# Patient Record
Sex: MALE | Race: WHITE | NOT HISPANIC OR LATINO | Employment: FULL TIME | URBAN - METROPOLITAN AREA
[De-identification: names, ages, dates, MRNs, and addresses within clinical notes are randomized per-mention and may not be internally consistent; named-entity substitution may affect disease eponyms.]

---

## 2017-04-13 ENCOUNTER — OFFICE VISIT (OUTPATIENT)
Dept: INTERNAL MEDICINE | Facility: IMAGING CENTER | Age: 67
End: 2017-04-13
Payer: MEDICARE

## 2017-04-13 VITALS
OXYGEN SATURATION: 97 % | HEIGHT: 69 IN | HEART RATE: 69 BPM | SYSTOLIC BLOOD PRESSURE: 120 MMHG | DIASTOLIC BLOOD PRESSURE: 60 MMHG | RESPIRATION RATE: 14 BRPM | WEIGHT: 185 LBS | BODY MASS INDEX: 27.4 KG/M2 | TEMPERATURE: 98.4 F

## 2017-04-13 DIAGNOSIS — E78.2 MIXED HYPERLIPIDEMIA WITH APOLIPOPROTEIN E4 VARIANT: ICD-10-CM

## 2017-04-13 DIAGNOSIS — F17.200 SMOKER: ICD-10-CM

## 2017-04-13 DIAGNOSIS — I25.84 CORONARY ATHEROSCLEROSIS DUE TO CALCIFIED CORONARY LESION: ICD-10-CM

## 2017-04-13 DIAGNOSIS — I77.6 INFLAMMATION OF ARTERIES (HCC): ICD-10-CM

## 2017-04-13 DIAGNOSIS — I25.10 CORONARY ATHEROSCLEROSIS DUE TO CALCIFIED CORONARY LESION: ICD-10-CM

## 2017-04-13 DIAGNOSIS — I10 ESSENTIAL HYPERTENSION: Primary | ICD-10-CM

## 2017-04-13 PROCEDURE — 1101F PT FALLS ASSESS-DOCD LE1/YR: CPT | Performed by: FAMILY MEDICINE

## 2017-04-13 PROCEDURE — 4004F PT TOBACCO SCREEN RCVD TLK: CPT | Performed by: FAMILY MEDICINE

## 2017-04-13 PROCEDURE — G8432 DEP SCR NOT DOC, RNG: HCPCS | Performed by: FAMILY MEDICINE

## 2017-04-13 PROCEDURE — 3017F COLORECTAL CA SCREEN DOC REV: CPT | Performed by: FAMILY MEDICINE

## 2017-04-13 PROCEDURE — 99214 OFFICE O/P EST MOD 30 MIN: CPT | Performed by: FAMILY MEDICINE

## 2017-04-13 PROCEDURE — G8419 CALC BMI OUT NRM PARAM NOF/U: HCPCS | Performed by: FAMILY MEDICINE

## 2017-04-13 PROCEDURE — 4040F PNEUMOC VAC/ADMIN/RCVD: CPT | Performed by: FAMILY MEDICINE

## 2017-04-13 PROCEDURE — G8598 ASA/ANTIPLAT THER USED: HCPCS | Performed by: FAMILY MEDICINE

## 2017-04-13 RX ORDER — LOSARTAN POTASSIUM 100 MG/1
100 TABLET ORAL DAILY
Qty: 90 TAB | Refills: 3 | Status: SHIPPED
Start: 2017-04-13 | End: 2018-04-30 | Stop reason: SDUPTHER

## 2017-04-13 RX ORDER — ROSUVASTATIN CALCIUM 10 MG/1
10 TABLET, COATED ORAL EVERY EVENING
Qty: 100 TAB | Refills: 4 | Status: SHIPPED
Start: 2017-04-13 | End: 2018-06-28 | Stop reason: SDUPTHER

## 2017-04-13 RX ORDER — EZETIMIBE 10 MG/1
10 TABLET ORAL DAILY
Qty: 100 TAB | Refills: 4 | Status: SHIPPED
Start: 2017-04-13 | End: 2018-06-28 | Stop reason: SDUPTHER

## 2017-04-13 RX ORDER — AMLODIPINE BESYLATE 10 MG/1
10 TABLET ORAL DAILY
Qty: 90 TAB | Refills: 3 | Status: SHIPPED
Start: 2017-04-13 | End: 2018-06-18 | Stop reason: SDUPTHER

## 2017-04-13 NOTE — MR AVS SNAPSHOT
"Deacon Pulido   2017 9:30 AM   Office Visit   MRN: [id number]    Department:  [facility]   Dept Phone:  [phone]    Description:  Male : 1950   Provider:  Hector Cornejo M.D.           Reason for Visit     Coronary Artery Disease           Allergies as of 2017     Allergen Noted Reactions    Cephalexin 2012       Cephalosporins 2012       Peanut-Derived 2012         You were diagnosed with     Essential hypertension   [8492913]  -  Primary     Coronary atherosclerosis due to calcified coronary lesion   [024654]       Smoker   [[id number]]       Mixed hyperlipidemia with apolipoprotein E4 variant   [[id number]]       Inflammation of arteries (CMS-HCC)   [200474]         Vital Signs     Blood Pressure Pulse Temperature Respirations Height Weight    120/60 mmHg 69 36.9 °C (98.4 °F) 14 1.753 m (5' 9.02\") 83.915 kg (185 lb)    Body Mass Index Oxygen Saturation Smoking Status             27.31 kg/m2 97% Current Every Day Smoker         Basic Information     Date Of Birth Sex Race Ethnicity Preferred Language    1950 Male White Non- English      Problem List              ICD-10-CM Priority Class Noted - Resolved    Kidney stones N20.0   Unknown - Present    Right knee pain M25.561   10/22/2013 - Present    Atherosclerosis of aorta (CMS-HCC) I70.0   10/22/2013 - Present    HTN (hypertension) I10   10/22/2013 - Present    Smoker F17.200   10/22/2013 - Present    Screening for osteoporosis Z13.820   2014 - Present    Family history of hypertension Z82.49   2014 - Present    Family history of stroke Z82.3   2014 - Present    Family history of dementia Z81.8   2014 - Present    Atherosclerosis of both carotid arteries I65.23   2014 - Present    Bronchitis J40   2014 - Present    Abnormal chest CT R93.8   2014 - Present    Mixed hyperlipidemia with apolipoprotein E4 variant E78.2   2014 - Present    Heterozygous MTHFR mutation C677T " (CMS-HCC) E72.12   9/17/2014 - Present    Elevated homocysteine (CMS-HCC) E72.11   9/17/2014 - Present    Inflammation of arteries (CMS-HCC) M30.0   9/17/2014 - Present    Insulin resistance E88.81   9/17/2014 - Present    Metabolic syndrome E88.81   9/17/2014 - Present    Dyspnea and respiratory abnormalities R06.00, R06.89   4/21/2015 - Present    Coronary atherosclerosis due to calcified coronary lesion I25.10, I25.84   12/14/2016 - Present    Thoracic aorta atherosclerosis (CMS-HCC) I70.0   12/14/2016 - Present    Dyslipidemia E78.5   12/14/2016 - Present      Health Maintenance        Date Due Completion Dates    COLONOSCOPY 6/18/2022 6/18/2012 (Done)    Override on 6/18/2012: Done    IMM DTaP/Tdap/Td Vaccine (2 - Td) 6/18/2023 6/18/2013            Current Immunizations     13-VALENT PCV PREVNAR 3/14/2008    Influenza TIV (IM) 12/14/2012    Influenza Vaccine Adult HD 11/1/2016, 10/20/2015    Pneumococcal polysaccharide vaccine (PPSV-23) 10/22/2013    SHINGLES VACCINE 12/14/2012    Tdap Vaccine 6/18/2013      Below and/or attached are the medications your provider expects you to take. Review all of your home medications and newly ordered medications with your provider and/or pharmacist. Follow medication instructions as directed by your provider and/or pharmacist. Please keep your medication list with you and share with your provider. Update the information when medications are discontinued, doses are changed, or new medications (including over-the-counter products) are added; and carry medication information at all times in the event of emergency situations     Allergies:  CEPHALEXIN - (reactions not documented)     CEPHALOSPORINS - (reactions not documented)     PEANUT-DERIVED - (reactions not documented)               Medications  Valid as of: April 13, 2017 - 11:03 AM    Generic Name Brand Name Tablet Size Instructions for use    Acetaminophen-Codeine (Solution) TYLENOL-CODEINE 120-12 MG/5ML Take 10 mL by  mouth every 6 hours as needed.        Albuterol Sulfate (Aero Soln) albuterol 108 (90 BASE) MCG/ACT Inhale 2 Puffs by mouth every 6 hours as needed for Shortness of Breath.        AmLODIPine Besylate (Tab) NORVASC 10 MG Take 1 Tab by mouth every day.        Aspirin (Tablet Delayed Response) ECOTRIN 81 MG Take 1 Tab by mouth every day.        Beclomethasone Dipropionate (Aero Soln) QVAR 40 MCG/ACT Inhale 1 Puff by mouth 2 Times a Day.        Cholecalciferol (Cap) Vitamin D 2000 UNITS Take 1 Cap by mouth every day.        Cyanocobalamin (Tab CR) B-12 1000 MCG Take 1 Each by mouth every day.        Ezetimibe (Tab) ZETIA 10 MG Take 1 Tab by mouth every day.        L-Methylfolate (Tab) L-Methylfolate 1 MG Take 1 Tab by mouth every day. Solgar - Metafolin        Losartan Potassium (Tab) COZAAR 100 MG Take 1 Tab by mouth every day.        Rosuvastatin Calcium (Tab) CRESTOR 10 MG Take 1 Tab by mouth every evening.        Varenicline Tartrate (Misc) CHANTIX DALLAS 0.5 MG X 11 & 1 MG X 42 Take as directed.        Varenicline Tartrate (Tab) CHANTIX 1 MG Take 1 Tab by mouth 2 times a day.        .                 Medicines prescribed today were sent to:     [facility] [id number] [facility] [place] - [id number] [facility] [address]    Phone: [phone] Fax: [phone]    Open 24 Hours?: No      Medication refill instructions:       If your prescription bottle indicates you have medication refills left, it is not necessary to call your provider’s office. Please contact your pharmacy and they will refill your medication.    If your prescription bottle indicates you do not have any refills left, you may request refills at any time through one of the following ways: The online [email or web address] system (except Urgent Care), by calling your provider’s office, or by asking your pharmacy to contact your provider’s office with a refill request. Medication refills are processed only during regular business hours and may not be available  until the next business day. Your provider may request additional information or to have a follow-up visit with you prior to refilling your medication.   *Please Note: Medication refills are assigned a new Rx number when refilled electronically. Your pharmacy may indicate that no refills were authorized even though a new prescription for the same medication is available at the pharmacy. Please request the medicine by name with the pharmacy before contacting your provider for a refill.        Instructions    Current Outpatient Prescriptions Ordered in [facility]   Medication Sig Dispense Refill   • losartan (COZAAR) 100 MG Tab Take 1 Tab by mouth every day. 90 Tab 3   • amlodipine (NORVASC) 10 MG Tab Take 1 Tab by mouth every day. 90 Tab 3   • ezetimibe (ZETIA) 10 MG Tab Take 1 Tab by mouth every day. 100 Tab 4   • rosuvastatin (CRESTOR) 10 MG Tab Take 1 Tab by mouth every evening. 100 Tab 4   • Cholecalciferol (VITAMIN D) 2000 UNITS Cap Take 1 Cap by mouth every day.     • Cyanocobalamin (B-12) 1000 MCG TBCR Take 1 Each by mouth every day.     • L-Methylfolate 1 MG TABS Take 1 Tab by mouth every day. Solgar - Metafolin     • aspirin EC (ECOTRIN) 81 MG TBEC Take 1 Tab by mouth every day.     • beclomethasone (QVAR) 40 MCG/ACT inhaler Inhale 1 Puff by mouth 2 Times a Day. 1 Inhaler 12   • albuterol 108 (90 BASE) MCG/ACT Aero Soln inhalation aerosol Inhale 2 Puffs by mouth every 6 hours as needed for Shortness of Breath. 8.5 g 12   • varenicline (CHANTIX STARTING MONTH PAK) 0.5 MG X 11 & 1 MG X 42 tablet Take as directed. 56 Tab 0   • varenicline (CHANTIX CONTINUING MONTH DALLAS) 1 MG tablet Take 1 Tab by mouth 2 times a day. 60 Tab 11   • acetaminophen-codeine (TYLENOL-CODEINE) 120-12 MG/5ML Solution Take 10 mL by mouth every 6 hours as needed. 120 mL 0     No current Epic-ordered facility-administered medications on file.           Other Notes About Your Plan     9/17/14 My chart sign-up  BLOOD DRAWS: BUTTERFLY SLOW  RETURN  8/14/14 Lab draw 8/17 f/u  9/17  PSA 6/2013    DEXA DUE 6/14           MyChart Access Code: Activation code not generated  Current MyChart Status: Active          Quit Tobacco Information     Do you want to quit using tobacco?    Quitting tobacco decreases risks of cancer, heart and lung disease, increases life expectancy, improves sense of taste and smell, and increases spending money, among other benefits.    If you are thinking about quitting, we can help.  • [facility] Quit Tobacco Program: [phone]  o Program occurs weekly for four weeks and includes pharmacist consultation on products to support quitting smoking or chewing tobacco. A provider referral is needed for pharmacist consultation.  • Tobacco Users Help Hotline: 7-401-QUITNOW (137-2622) or https://[place].quitlogix.org/  o Free, confidential telephone and online coaching for [place] residents. Sessions are designed on a schedule that is convenient for you. Eligible clients receive free nicotine replacement therapy.  • Nationally: www.smokefree.gov  o Information and professional assistance to support both immediate and long-term needs as you become, and remain, a non-smoker. Smokefree.gov allows you to choose the help that best fits your needs.

## 2017-04-13 NOTE — PATIENT INSTRUCTIONS
Current Outpatient Prescriptions Ordered in [facility]   Medication Sig Dispense Refill   • losartan (COZAAR) 100 MG Tab Take 1 Tab by mouth every day. 90 Tab 3   • amlodipine (NORVASC) 10 MG Tab Take 1 Tab by mouth every day. 90 Tab 3   • ezetimibe (ZETIA) 10 MG Tab Take 1 Tab by mouth every day. 100 Tab 4   • rosuvastatin (CRESTOR) 10 MG Tab Take 1 Tab by mouth every evening. 100 Tab 4   • Cholecalciferol (VITAMIN D) 2000 UNITS Cap Take 1 Cap by mouth every day.     • Cyanocobalamin (B-12) 1000 MCG TBCR Take 1 Each by mouth every day.     • L-Methylfolate 1 MG TABS Take 1 Tab by mouth every day. Solgar - Metafolin     • aspirin EC (ECOTRIN) 81 MG TBEC Take 1 Tab by mouth every day.     • beclomethasone (QVAR) 40 MCG/ACT inhaler Inhale 1 Puff by mouth 2 Times a Day. 1 Inhaler 12   • albuterol 108 (90 BASE) MCG/ACT Aero Soln inhalation aerosol Inhale 2 Puffs by mouth every 6 hours as needed for Shortness of Breath. 8.5 g 12   • varenicline (CHANTIX STARTING MONTH PAK) 0.5 MG X 11 & 1 MG X 42 tablet Take as directed. 56 Tab 0   • varenicline (CHANTIX CONTINUING MONTH DALLAS) 1 MG tablet Take 1 Tab by mouth 2 times a day. 60 Tab 11   • acetaminophen-codeine (TYLENOL-CODEINE) 120-12 MG/5ML Solution Take 10 mL by mouth every 6 hours as needed. 120 mL 0     No current Epic-ordered facility-administered medications on file.

## 2017-04-13 NOTE — PROGRESS NOTES
SUBJECTIVE:    Chief Complaint   Patient presents with   • Coronary Artery Disease       Deacon Pulido is a 66 y.o. male,   Established Patient     PROBLEM #1-HISTORY OF PRESENT ILLNESS  Existing Problem, but requiring re-evaluation  PATIENT STATEMENT OF PROBLEM - HTN, subclinical atherosclerosis, smoker  ONSET - years  COURSE - doing well. Still smoking, but he states he is much closer to quitting than he has ever been before. Needs refills.   INTENSITY/STATUS/LOCATION/RADIATION - variable/ present  AGGRAVATORS - Multifactorial   RELIEVERS - meds, some Therapeutic Lifestyle Changes   TREATMENTS/COMPLIANCE/@GOAL? - same/ no / not w/ smoking    PROBLEM #2-HISTORY OF PRESENT ILLNESS  Existing Problem, but requiring re-evaluation  PATIENT STATEMENT OF PROBLEM - Abnormal Chest CT, 11/2016  ONSET - 11/2016  COURSE - I reminded him today that he will need another Chest CT in Nov. 2017 for follow-up.     Allergies   Allergen Reactions   • Cephalexin    • Cephalosporins    • Peanut-Derived        Patient Active Problem List    Diagnosis Date Noted   • Coronary atherosclerosis due to calcified coronary lesion 12/14/2016   • Thoracic aorta atherosclerosis (CMS-HCC) 12/14/2016   • Dyslipidemia 12/14/2016   • Dyspnea and respiratory abnormalities 04/21/2015   • Mixed hyperlipidemia with apolipoprotein E4 variant 09/17/2014   • Heterozygous MTHFR mutation C677T (CMS-HCC) 09/17/2014   • Elevated homocysteine (CMS-[facility]) 09/17/2014   • Inflammation of arteries (CMS-[facility]) 09/17/2014   • Insulin resistance 09/17/2014   • Metabolic syndrome 09/17/2014   • Atherosclerosis of both carotid arteries 08/11/2014   • Bronchitis 08/11/2014   • Abnormal chest CT 08/11/2014   • Screening for osteoporosis 08/07/2014   • Family history of hypertension 08/07/2014   • Family history of stroke 08/07/2014   • Family history of dementia 08/07/2014   • Right knee pain 10/22/2013   • Atherosclerosis of aorta (CMS-HCC) 10/22/2013   • HTN (hypertension)  10/22/2013   • Smoker 10/22/2013   • Kidney stones        Outpatient Encounter Prescriptions as of 4/13/2017   Medication Sig Dispense Refill   • losartan (COZAAR) 100 MG Tab Take 1 Tab by mouth every day. 90 Tab 3   • amlodipine (NORVASC) 10 MG Tab Take 1 Tab by mouth every day. 90 Tab 3   • ezetimibe (ZETIA) 10 MG Tab Take 1 Tab by mouth every day. 100 Tab 4   • rosuvastatin (CRESTOR) 10 MG Tab Take 1 Tab by mouth every evening. 100 Tab 4   • Cholecalciferol (VITAMIN D) 2000 UNITS Cap Take 1 Cap by mouth every day.     • Cyanocobalamin (B-12) 1000 MCG TBCR Take 1 Each by mouth every day.     • L-Methylfolate 1 MG TABS Take 1 Tab by mouth every day. Solgar - Metafolin     • aspirin EC (ECOTRIN) 81 MG TBEC Take 1 Tab by mouth every day.     • beclomethasone (QVAR) 40 MCG/ACT inhaler Inhale 1 Puff by mouth 2 Times a Day. 1 Inhaler 12   • albuterol 108 (90 BASE) MCG/ACT Aero Soln inhalation aerosol Inhale 2 Puffs by mouth every 6 hours as needed for Shortness of Breath. 8.5 g 12   • varenicline (CHANTIX STARTING MONTH DALLAS) 0.5 MG X 11 & 1 MG X 42 tablet Take as directed. 56 Tab 0   • varenicline (CHANTIX CONTINUING MONTH PAK) 1 MG tablet Take 1 Tab by mouth 2 times a day. 60 Tab 11   • [DISCONTINUED] losartan (COZAAR) 100 MG Tab TAKE ONE TABLET BY MOUTH DAILY FOR HIGH    BLOOD PRESSURE -GENERIC FOR COZAAR 90 Tab 3   • [DISCONTINUED] amlodipine (NORVASC) 10 MG Tab TAKE ONE TABLET BY MOUTH DAILY -GENERICFOR NORVASC 90 Tab 3   • acetaminophen-codeine (TYLENOL-CODEINE) 120-12 MG/5ML Solution Take 10 mL by mouth every 6 hours as needed. 120 mL 0   • [DISCONTINUED] ezetimibe (ZETIA) 10 MG Tab Take 1 Tab by mouth every day. 100 Tab 4   • [DISCONTINUED] rosuvastatin (CRESTOR) 10 MG Tab Take 1 Tab by mouth every evening. 100 Tab 4   • [DISCONTINUED] rosuvastatin (CRESTOR) 10 MG TABS Take 1 Tab by mouth every evening. 90 Tab 4     No facility-administered encounter medications on file as of 4/13/2017.       Social History  "  Substance Use Topics   • Smoking status: Current Every Day Smoker -- 1.00 packs/day for 40 years     Types: Cigarettes   • Smokeless tobacco: Never Used      Comment: 3/4 pack per day   • Alcohol Use: 0.0 oz/week     0 Standard drinks or equivalent per week      Comment: rarely       Family History   Problem Relation Age of Onset   • Asthma Mother    • Hypertension Father    • Stroke Father    • Dementia Father    • Cancer Paternal Aunt    • Cancer Paternal Uncle        Patient's Past, Social, and Family History reviewed and updated by me in EPIC today.    REVIEW OF SYMPTOMS:               Pertinent Positives as above.    All other systems reviewed and negative.     OBJECTIVE:    /60 mmHg  Pulse 69  Temp(Src) 36.9 °C (98.4 °F)  Resp 14  Ht 1.753 m (5' 9.02\")  Wt 83.915 kg (185 lb)  BMI 27.31 kg/m2  SpO2 97%  Body mass index is 27.31 kg/(m^2).    Well developed, well nourished male, no acute distress, chronically-ill appearing. Comfortable, appears stated age, pleasant and cooperative  HEAD: atraumatic, normocephalic   EYES: Conjunctiva normal, EOMI, PERRLA, acuity grossly intact.   EARS/NOSE/THROAT: TM's normal, no SSX of infection, no perforation, no hemotympanum, acuity grossly intact. Oropharynx: benign, no lesions noted. Nares: benign.   NECK: supple, no adenopathy, no thyromegaly or nodules, no JVD, no carotid bruits.   CHEST/LUNGS: clear to auscultation and percussion bilaterally. No adventitious breath sounds.   CARDIOVASCULAR: regular rate and rhythm, no murmur. PMI not displaced. Good central and peripheral pulses.   BACK: no CVA tenderness.   ABDOMEN: soft, non-tender, non-distended, no masses, no hepatosplenomegaly. Normal active bowel tones.   : deferred.   Rectal: deferred.   Extremities: warm/well-perfused, no cyanosis, clubbing, or edema.   SKIN: clear, unbroken, no rashes, normal turgor.   Neuro: Mental Status: Alert and Oriented x 3. CN II-XII grossly intact. Gait normal. Non-focal, " intact. Normal strength, sensation    ASSESSMENT:    1. Essential hypertension  losartan (COZAAR) 100 MG Tab    amlodipine (NORVASC) 10 MG Tab   2. Coronary atherosclerosis due to calcified coronary lesion     3. Smoker     4. Mixed hyperlipidemia with apolipoprotein E4 variant  ezetimibe (ZETIA) 10 MG Tab    rosuvastatin (CRESTOR) 10 MG Tab   5. Inflammation of arteries (CMS-HCC)  rosuvastatin (CRESTOR) 10 MG Tab       PLAN:    Total Face-to-Face time spent with patient: 30 minutes  Amount of time spent counseling patient and/or coordinating care: 20 minutes    The nature of patient counseling as below:  -Patient Education, including below topics:  -Differential Diagnoses and treatment options discussed  -Risks, benefits, alternatives discussed  -Pertinent previous reviewed with patient in detail  -Health Maintenance Exam issues discussed  -Exercise  -Dietary recommendations discussed  -QUIT SMOKING!!!  -Therapeutic Lifestyle Changes discussed    The nature of coordination of care as below:  -Medications added: Multiple refills  -Medications discontinued: none  -Medications adjusted: none  -Continue (other) present chronic medications  -Referrals: He is deciding which provider to establish with in light of my upcoming practice change  -Other: I reminded him that he will need to have another Chest CT in Nov. 2017  -Seek medical attention immediately if worse    FOLLOW-UP:  For Health Care Maintenance Exams  And as needed.

## 2017-05-09 ENCOUNTER — OFFICE VISIT (OUTPATIENT)
Dept: MEDICAL GROUP | Facility: MEDICAL CENTER | Age: 67
End: 2017-05-09
Payer: MEDICARE

## 2017-05-09 VITALS
HEART RATE: 74 BPM | HEIGHT: 69 IN | TEMPERATURE: 98.8 F | DIASTOLIC BLOOD PRESSURE: 66 MMHG | OXYGEN SATURATION: 98 % | WEIGHT: 184.2 LBS | BODY MASS INDEX: 27.28 KG/M2 | SYSTOLIC BLOOD PRESSURE: 122 MMHG

## 2017-05-09 DIAGNOSIS — F17.210 CIGARETTE NICOTINE DEPENDENCE, UNCOMPLICATED: ICD-10-CM

## 2017-05-09 DIAGNOSIS — E78.2 MIXED HYPERLIPIDEMIA WITH APOLIPOPROTEIN E4 VARIANT: ICD-10-CM

## 2017-05-09 DIAGNOSIS — Z12.2 ENCOUNTER FOR SCREENING FOR LUNG CANCER: ICD-10-CM

## 2017-05-09 DIAGNOSIS — F17.200 SMOKER: ICD-10-CM

## 2017-05-09 DIAGNOSIS — R73.03 PREDIABETES: ICD-10-CM

## 2017-05-09 DIAGNOSIS — I10 ESSENTIAL HYPERTENSION: ICD-10-CM

## 2017-05-09 DIAGNOSIS — E88.810 METABOLIC SYNDROME: ICD-10-CM

## 2017-05-09 PROCEDURE — 99214 OFFICE O/P EST MOD 30 MIN: CPT | Performed by: FAMILY MEDICINE

## 2017-05-09 NOTE — PATIENT INSTRUCTIONS
"Tips for Musculoskeletal pain:     1) RICE therapy:    Rest the injured area as much as possible for the first 24-48hours after the   injury.  Then, think of ways to modify your activity to not re-injure the   same area or cause new injury to a different musculoskeletal group.   Ice the injury whenever there is inflammation/swelling, increased warmth to the   joint, or at the end of a long day of use.  Do not place ice directly on skin,   and use ice therapy for no longer than 15-20 minutes at a time.   Compresses.  In addition to the ice compresses:     a) Use warm moist compresses (either a heating pad or a  clean sock    filled with rice or beans then microwaved to a comfortable     warmth) for 15-20minutes, particularly first thing in the morning.     An alternative could be to have a warm to hot shower     (non-scalding) beat directly on the affected area.    b) A compressive bandage may be used to provide support, decrease    swelling, and potentially improve comfort.   Elevate affected area to above the level of your heart.  This increases lymphatic   drainage from the affected area, and thus decreases swelling/pain.     2) Medications:   NSAID Therapy to decrease swelling/inflammation of muscles, and nerves, as   well as to provide pain relief: Ibuprofen 600mg by mouth up to every 6 hours as needed   Topical analgesia: Patches (Menthol, Methyl Salicylate, Camphor, Capsaicin, Lidocaine) or Creams or Balms (Menthol, Methyl Salicylate, Camphor, Capsaicin)     3) Physical Therapy and General Instructions:   Begin normal activities as pain recedes.     Dr. Arcos's tips for \"Lifestyle Medicine:\"     Check out the talk/documentary on \"How not to die\" by Dr. Jose Hobbs (on his website nutritionfacts.org, he also authored a book with this title).       1) Make SMART lifestyle changes: Specific, Measurable, Attainable, Relevant, Time-sensitive.  The lifestyle changes that you need to make are with regards to: " nutrition, cardiovascular exercise, sleep, stress management.  Make these changes every 2 weeks, revisiting the previous goals and perhaps revising them and/or setting new ones.       2) Nutrition: Make as many changes as you can to increase the amount of whole-foods (not Whole Foods, necessarily!  ;-)), plant-based diet as possible:   A) Books: Eat to Live (Dr. Malcolm Tomlin), The Spectrum (Dr. Anthony Mahoney), The Starch Solution (Dr. Deacon Givens)      B) Documentaries (can usually be found on [employer]): [place].  Fat, Sick, and Nearly Dead.  Fed Up.           3) Cardiovascular Exercise: The center for disease control recommends a minimum of 150 minutes per week of moderate intensity cardiovascular exercise for weight maintenance and cardiovascular health.  Set this as your initial goal, with at least 30 minutes per session. Types of exercise can include 30 minutes of elliptical, 30 minutes of decently fast jog, 30 minutes of swimming, 30 minutes of heavy gardening (lifting big bags of fertilizer, digging deep holes/ditches).  He can cut down the minute requirements to half, by doing higher intensity sports such as a game of tennis, or soccer.  He notes the library and check out with they have for home exercise programs, as well.       4) Sleep:    A) Goal: Obtain a minimum of 7-8hours of continuous, uninterrupted, restful sleep per night.    B) Tips for Sleep Hygiene:    I) Go to bed and wake up at consistent times whether work/school day or not.     II) Keep room dark, quiet, and comfortable.  Increase exposure to sunlight during awake times and avoid bright lights (especially anything with a backlight) at least the last 1-2hours before going to sleep.     III) Don't nap.     IV) Avoid stimulant or caffeine use more than 4 hours after wake time.        5) Stress Management: You cannot change the stresses of life dizziness necessarily, but you can change how he responds of them. One good way to manage  stress is to write things down in order to help you process how to approach things in general or specifically. Another good way is to talk it out with someone you trusts, specifically your significant other or good friend. A definite great way to deal with stress is to have cardiovascular exercise!

## 2017-05-09 NOTE — MR AVS SNAPSHOT
"        Deacon Pulido   2017 9:00 AM   Office Visit   MRN: [id number]    Department:  [place] [address]   Dept Phone:  [phone]    Description:  Male : 1950   Provider:  Berny Arcos M.D.           Reason for Visit     Establish Care shoulder ache x2-3 mos      Allergies as of 2017     Allergen Noted Reactions    Penicillamine 2017   Unspecified    high    Cephalexin 2012       Cephalosporins 2012       Peanut-Derived 2012         You were diagnosed with     Metabolic syndrome   [170979]       Prediabetes   [058298]       Mixed hyperlipidemia with apolipoprotein E4 variant   [579636]       Essential hypertension   [3645604]       Encounter for screening for lung cancer   [3223507]       Smoker   [184309]       Cigarette nicotine dependence, uncomplicated   [226318]         Vital Signs     Blood Pressure Pulse Temperature Height Weight Body Mass Index    122/66 mmHg 74 37.1 °C (98.8 °F) 1.753 m (5' 9.02\") 83.553 kg (184 lb 3.2 oz) 27.19 kg/m2    Oxygen Saturation Smoking Status                98% Current Every Day Smoker          Basic Information     Date Of Birth Sex Race Ethnicity Preferred Language    1950 Male White Non- English      Problem List              ICD-10-CM Priority Class Noted - Resolved    History of kidney stones Z87.442   Unknown - Present    Primary osteoarthritis of right knee M17.11   10/22/2013 - Present    Atherosclerosis of aorta (CMS-HCC) I70.0   10/22/2013 - Present    HTN (hypertension) I10   10/22/2013 - Present    Smoker F17.200   10/22/2013 - Present    Screening for osteoporosis Z13.820   2014 - Present    Family history of hypertension Z82.49   2014 - Present    Family history of stroke Z82.3   2014 - Present    Family history of dementia Z81.8   2014 - Present    Atherosclerosis of both carotid arteries I65.23   2014 - Present    Abnormal chest CT R93.8   2014 - Present    Mixed hyperlipidemia with " apolipoprotein E4 variant E78.2   9/17/2014 - Present    Heterozygous MTHFR mutation C677T (CMS-HCC) E72.12   9/17/2014 - Present    Inflammation of arteries (CMS-HCC) M30.0   9/17/2014 - Present    Prediabetes R73.03   9/17/2014 - Present    Metabolic syndrome E88.81   9/17/2014 - Present    Coronary atherosclerosis due to calcified coronary lesion I25.10, I25.84   12/14/2016 - Present    Thoracic aorta atherosclerosis (CMS-HCC) I70.0   12/14/2016 - Present    Dyslipidemia E78.5   12/14/2016 - Present    Encounter for screening for lung cancer Z12.2   5/9/2017 - Present      Health Maintenance        Date Due Completion Dates    COLONOSCOPY 6/18/2022 6/18/2012 (Done)    Override on 6/18/2012: Done    IMM DTaP/Tdap/Td Vaccine (2 - Td) 6/18/2023 6/18/2013            Current Immunizations     13-VALENT PCV PREVNAR 3/14/2008    Influenza TIV (IM) 12/14/2012    Influenza Vaccine Adult HD 11/1/2016, 10/20/2015    Pneumococcal polysaccharide vaccine (PPSV-23) 10/22/2013    SHINGLES VACCINE 12/14/2012    Tdap Vaccine 6/18/2013      Below and/or attached are the medications your provider expects you to take. Review all of your home medications and newly ordered medications with your provider and/or pharmacist. Follow medication instructions as directed by your provider and/or pharmacist. Please keep your medication list with you and share with your provider. Update the information when medications are discontinued, doses are changed, or new medications (including over-the-counter products) are added; and carry medication information at all times in the event of emergency situations     Allergies:  PENICILLAMINE - Unspecified     CEPHALEXIN - (reactions not documented)     CEPHALOSPORINS - (reactions not documented)     PEANUT-DERIVED - (reactions not documented)               Medications  Valid as of: May 09, 2017 - 10:06 AM    Generic Name Brand Name Tablet Size Instructions for use    Albuterol Sulfate (Aero Soln)  albuterol 108 (90 BASE) MCG/ACT Inhale 2 Puffs by mouth every 6 hours as needed for Shortness of Breath.        AmLODIPine Besylate (Tab) NORVASC 10 MG Take 1 Tab by mouth every day.        Aspirin (Tablet Delayed Response) ECOTRIN 81 MG Take 1 Tab by mouth every day.        Beclomethasone Dipropionate (Aero Soln) QVAR 40 MCG/ACT Inhale 1 Puff by mouth 2 Times a Day.        Cholecalciferol (Cap) Vitamin D 2000 UNITS Take 1 Cap by mouth every day.        Cyanocobalamin (Tab CR) B-12 1000 MCG Take 1 Each by mouth every day.        Ezetimibe (Tab) ZETIA 10 MG Take 1 Tab by mouth every day.        Losartan Potassium (Tab) COZAAR 100 MG Take 1 Tab by mouth every day.        Rosuvastatin Calcium (Tab) CRESTOR 10 MG Take 1 Tab by mouth every evening.        .                 Medicines prescribed today were sent to:     [facility] - [place], [place] - [address] [facility]    [address] [facility] [address]    Phone: [phone] Fax: [phone]    Open 24 Hours?: No      Medication refill instructions:       If your prescription bottle indicates you have medication refills left, it is not necessary to call your provider’s office. Please contact your pharmacy and they will refill your medication.    If your prescription bottle indicates you do not have any refills left, you may request refills at any time through one of the following ways: The online [employer] system (except Urgent Care), by calling your provider’s office, or by asking your pharmacy to contact your provider’s office with a refill request. Medication refills are processed only during regular business hours and may not be available until the next business day. Your provider may request additional information or to have a follow-up visit with you prior to refilling your medication.   *Please Note: Medication refills are assigned a new Rx number when refilled electronically. Your pharmacy may indicate that no refills were authorized even though a new prescription for  "the same medication is available at the pharmacy. Please request the medicine by name with the pharmacy before contacting your provider for a refill.        Instructions    Tips for Musculoskeletal pain:     1) RICE therapy:    Rest the injured area as much as possible for the first 24-48hours after the   injury.  Then, think of ways to modify your activity to not re-injure the   same area or cause new injury to a different musculoskeletal group.   Ice the injury whenever there is inflammation/swelling, increased warmth to the   joint, or at the end of a long day of use.  Do not place ice directly on skin,   and use ice therapy for no longer than 15-20 minutes at a time.   Compresses.  In addition to the ice compresses:     a) Use warm moist compresses (either a heating pad or a  clean sock    filled with rice or beans then microwaved to a comfortable     warmth) for 15-20minutes, particularly first thing in the morning.     An alternative could be to have a warm to hot shower     (non-scalding) beat directly on the affected area.    b) A compressive bandage may be used to provide support, decrease    swelling, and potentially improve comfort.   Elevate affected area to above the level of your heart.  This increases lymphatic   drainage from the affected area, and thus decreases swelling/pain.     2) Medications:   NSAID Therapy to decrease swelling/inflammation of muscles, and nerves, as   well as to provide pain relief: Ibuprofen 600mg by mouth up to every 6 hours as needed   Topical analgesia: Patches (Menthol, Methyl Salicylate, Camphor, Capsaicin, Lidocaine) or Creams or Balms (Menthol, Methyl Salicylate, Camphor, Capsaicin)     3) Physical Therapy and General Instructions:   Begin normal activities as pain recedes.     Dr. Arcos's tips for \"Lifestyle Medicine:\"     Check out the talk/documentary on \"How not to die\" by Dr. Jose Hobbs (on his website nutritionfacts.org, he also authored a book with this " title).       1) Make SMART lifestyle changes: Specific, Measurable, Attainable, Relevant, Time-sensitive.  The lifestyle changes that you need to make are with regards to: nutrition, cardiovascular exercise, sleep, stress management.  Make these changes every 2 weeks, revisiting the previous goals and perhaps revising them and/or setting new ones.       2) Nutrition: Make as many changes as you can to increase the amount of whole-foods (not Whole Foods, necessarily!  ;-)), plant-based diet as possible:   A) Books: Eat to Live (Dr. Malcolm Tomlin), The Spectrum (Dr. Anthony Mahoney), The Starch Solution (Dr. Deacon Givens)      B) Documentaries (can usually be found on [employer]): [place].  Fat, Sick, and Nearly Dead.  Fed Up.           3) Cardiovascular Exercise: The center for disease control recommends a minimum of 150 minutes per week of moderate intensity cardiovascular exercise for weight maintenance and cardiovascular health.  Set this as your initial goal, with at least 30 minutes per session. Types of exercise can include 30 minutes of elliptical, 30 minutes of decently fast jog, 30 minutes of swimming, 30 minutes of heavy gardening (lifting big bags of fertilizer, digging deep holes/ditches).  He can cut down the minute requirements to half, by doing higher intensity sports such as a game of tennis, or soccer.  He notes the library and check out with they have for home exercise programs, as well.       4) Sleep:    A) Goal: Obtain a minimum of 7-8hours of continuous, uninterrupted, restful sleep per night.    B) Tips for Sleep Hygiene:    I) Go to bed and wake up at consistent times whether work/school day or not.     II) Keep room dark, quiet, and comfortable.  Increase exposure to sunlight during awake times and avoid bright lights (especially anything with a backlight) at least the last 1-2hours before going to sleep.     III) Don't nap.     IV) Avoid stimulant or caffeine use more than 4 hours after  wake time.        5) Stress Management: You cannot change the stresses of life dizziness necessarily, but you can change how he responds of them. One good way to manage stress is to write things down in order to help you process how to approach things in general or specifically. Another good way is to talk it out with someone you trusts, specifically your significant other or good friend. A definite great way to deal with stress is to have cardiovascular exercise!         Other Notes About Your Plan     9/17/14 My chart sign-up  BLOOD DRAWS: BUTTERFLY SLOW RETURN  8/14/14 Lab draw 8/17 f/u  9/17  PSA 6/2013    DEXA DUE 6/14           [email or web address] Access Code: Activation code not generated  Current [email or web address] Sentinel Status: Active          Quit Tobacco Information     Do you want to quit using tobacco?    Quitting tobacco decreases risks of cancer, heart and lung disease, increases life expectancy, improves sense of taste and smell, and increases spending money, among other benefits.    If you are thinking about quitting, we can help.  • [facility] Quit Tobacco Program: [phone]  o Program occurs weekly for four weeks and includes pharmacist consultation on products to support quitting smoking or chewing tobacco. A provider referral is needed for pharmacist consultation.  • Tobacco Users Help Hotline: 6-535-QUITNOW (698-7900) or https://[place].quitlogix.org/  o Free, confidential telephone and online coaching for [place] residents. Sessions are designed on a schedule that is convenient for you. Eligible clients receive free nicotine replacement therapy.  • Nationally: www.smokefree.gov  o Information and professional assistance to support both immediate and long-term needs as you become, and remain, a non-smoker. Smokefree.gov allows you to choose the help that best fits your needs.

## 2017-05-16 NOTE — PROGRESS NOTES
Subjective:     Chief Complaint   Patient presents with   • Establish Care     shoulder ache x2-3 mos       History of Present Illness:  Deacon Pulido is a 66 y.o. male established patient who presents today to establish her medical care with me. PMH, PSH, Social History, Medications, Allergies, FMH all reviewed as documented:    Metabolic syndrome  Patient with prediabetes with an A1c of 5.8% in November 2016, mixed hyperlipidemia with lipid profile as below (is taking his Crestor 10 mg by mouth daily at bedtime, as well as a 10 mg by mouth daily), and hypertension (is taking his Norvasc 10 mg by mouth daily, and Losartan 100 mg by mouth daily), and is also taking his aspirin 81 mg by mouth daily.    ROS is NEGATIVE for dizziness, generalized weakness/fatigue, vision/hearing changes, jaw pain/paresthesias, BUE pain/paresthesias/numbness/weakness, chest pain/pressure, palpitations, dyspnea, RUQ abdominal pain, oliguria/anuria, BLE edema.    ROS is NEGATIVE for blurred vision, polydipsia, polyuria, diaphoresis, palpitations, fatigue, irritability, flank pain, BLE paresthesias.    Prediabetes  Please see notes from same date of service 05/09/2017 regarding metabolic syndrome.    Mixed hyperlipidemia with apolipoprotein E4 variant  Please see notes from same date of service 05/09/2017 regarding metabolic syndrome.    LDL 47 on 11/2016.    Lab Results   Component Value Date/Time    CHOLESTEROL, 11/01/2016 10:15 AM    LDL 90 06/06/2013 09:00 AM    HDL 54 11/01/2016 10:15 AM    TRIGLYCERIDES 85 11/01/2016 10:15 AM        HTN (hypertension)  Please see notes from same date of service 05/09/2017 regarding metabolic syndrome.    Encounter for screening for lung cancer  Patient has not had lung cancer screening. Patient is currently without any symptoms suggestive of lung cancer.    ROS negative for fevers, chills, generalized weakness/fatigue, unintentional weight loss, hematochezia, melena, nausea, dyspnea, tachypnea,  respiratory distress, cyanotic skin changes, altered mental status, syncopal/presyncopal episodes.        Patient Active Problem List    Diagnosis Date Noted   • Encounter for screening for lung cancer 05/09/2017   • Coronary atherosclerosis due to calcified coronary lesion 12/14/2016   • Thoracic aorta atherosclerosis (CMS-HCC) 12/14/2016   • Mixed hyperlipidemia with apolipoprotein E4 variant 09/17/2014   • Heterozygous MTHFR mutation C677T (CMS-HCC) 09/17/2014   • Inflammation of arteries (CMS-HCC) 09/17/2014   • Prediabetes 09/17/2014   • Metabolic syndrome 09/17/2014   • Atherosclerosis of both carotid arteries 08/11/2014   • Abnormal chest CT 08/11/2014   • Screening for osteoporosis 08/07/2014   • Family history of hypertension 08/07/2014   • Family history of stroke 08/07/2014   • Family history of dementia 08/07/2014   • Primary osteoarthritis of right knee 10/22/2013   • Atherosclerosis of aorta (CMS-HCC) 10/22/2013   • HTN (hypertension) 10/22/2013   • Smoker 10/22/2013   • History of kidney stones        Additional History:   Allergies:    Penicillamine; Cephalexin; Cephalosporins; and Peanut-derived     Current Medications:     Current Outpatient Prescriptions   Medication Sig Dispense Refill   • losartan (COZAAR) 100 MG Tab Take 1 Tab by mouth every day. 90 Tab 3   • amlodipine (NORVASC) 10 MG Tab Take 1 Tab by mouth every day. 90 Tab 3   • ezetimibe (ZETIA) 10 MG Tab Take 1 Tab by mouth every day. 100 Tab 4   • rosuvastatin (CRESTOR) 10 MG Tab Take 1 Tab by mouth every evening. 100 Tab 4   • Cholecalciferol (VITAMIN D) 2000 UNITS Cap Take 1 Cap by mouth every day.     • Cyanocobalamin (B-12) 1000 MCG TBCR Take 1 Each by mouth every day.     • aspirin EC (ECOTRIN) 81 MG TBEC Take 1 Tab by mouth every day.     • beclomethasone (QVAR) 40 MCG/ACT inhaler Inhale 1 Puff by mouth 2 Times a Day. 1 Inhaler 12   • albuterol 108 (90 BASE) MCG/ACT Aero Soln inhalation aerosol Inhale 2 Puffs by mouth every 6 hours  "as needed for Shortness of Breath. 8.5 g 12     No current facility-administered medications for this visit.        Social History:     Social History   Substance Use Topics   • Smoking status: Current Every Day Smoker -- 1.00 packs/day for 50 years     Types: Cigarettes   • Smokeless tobacco: Never Used      Comment: 05/09/17 -- currently 1/2 pack per day   • Alcohol Use: 0.0 oz/week     0 Standard drinks or equivalent per week      Comment: 1x/month, 1 drink per time       ROS:     - NOTE: All other systems reviewed and are negative, except as in HPI.     Objective:   Physical Exam:    Vitals: Blood pressure 122/66, pulse 74, temperature 37.1 °C (98.8 °F), height 1.753 m (5' 9.02\"), weight 83.553 kg (184 lb 3.2 oz), SpO2 98 %.   BMI: Body mass index is 27.19 kg/(m^2).   General/Constitutional: Vitals as above, Well nourished, well developed male in no acute distress   Head/Eyes: Head is grossly normal & atraumatic, bilateral conjunctivae clear and not injected, bilateral EOMI, bilateral PERRL   ENT: Bilateral external ears grossly normal in appearance, Hearing grossly intact, External nares normal in appearance and without discharge/bleeding   Respiratory: No respiratory distress, bilateral lungs are clear to ausculation in all lung fields (anterior/lateral/posterior), no wheezing/rhonchi/rales   Cardiovascular: Regular rate and rhythm without murmur/gallops/rubs, distal pulses are intact and equal bilaterally (radial, posterior tibial), no bilateral lower extremity edema   MSK: Gait grossly normal & not antalgic   Integumentary: No apparent rashes   Psych: Judgment grossly appropriate, no apparent depression/anxiety    Assessment and Plan:   1. Metabolic syndrome  2. Prediabetes  3. Mixed hyperlipidemia with apolipoprotein E4 variant  4. Essential hypertension  Prediabetes uncontrolled, otherwise hyperlipidemia well controlled, and blood pressure well controlled.     A) Education: Patient and I discussed the " importance of lifestyle changes, with particular emphasis on plant-based nutrition (for the purposes of weight loss, general health, Metabolic syndrome, prediabetes, HLD, HTN), as well as cardiovascular exercise, proper sleep, and stress management.  This discussion is briefly summarized in the patient instruction section below.  Patient verbalized understanding.    B) Medication: Patient to continue taking blood pressure medications as well as questionable medications as listed above.    5. Encounter for screening for lung cancer  6. Smoker  7. Cigarette nicotine dependence, uncomplicated   Uncontrolled, patient still smoking cigarettes. Patient without obvious signs or symptoms of lung cancer. Will refer to lung cancer screening.   - REFERRAL TO LUNG CANCER SCREENING PROGRAM      RTC: 3 months for Medicare annual exam.    PLEASE NOTE: This dictation was created using voice recognition software. I have made every reasonable attempt to correct obvious errors, but I expect that there are errors of grammar and possibly content that I did not discover before finalizing the note.

## 2017-05-16 NOTE — ASSESSMENT & PLAN NOTE
Patient with prediabetes with an A1c of 5.8% in November 2016, mixed hyperlipidemia with lipid profile as below (is taking his Crestor 10 mg by mouth daily at bedtime, as well as a 10 mg by mouth daily), and hypertension (is taking his Norvasc 10 mg by mouth daily, and Losartan 100 mg by mouth daily), and is also taking his aspirin 81 mg by mouth daily.    ROS is NEGATIVE for dizziness, generalized weakness/fatigue, vision/hearing changes, jaw pain/paresthesias, BUE pain/paresthesias/numbness/weakness, chest pain/pressure, palpitations, dyspnea, RUQ abdominal pain, oliguria/anuria, BLE edema.    ROS is NEGATIVE for blurred vision, polydipsia, polyuria, diaphoresis, palpitations, fatigue, irritability, flank pain, BLE paresthesias.

## 2017-05-16 NOTE — ASSESSMENT & PLAN NOTE
Patient has not had lung cancer screening. Patient is currently without any symptoms suggestive of lung cancer.    ROS negative for fevers, chills, generalized weakness/fatigue, unintentional weight loss, hematochezia, melena, nausea, dyspnea, tachypnea, respiratory distress, cyanotic skin changes, altered mental status, syncopal/presyncopal episodes.

## 2017-05-17 ENCOUNTER — DOCUMENTATION (OUTPATIENT)
Dept: HEMATOLOGY ONCOLOGY | Facility: MEDICAL CENTER | Age: 67
End: 2017-05-17

## 2017-05-17 NOTE — PROGRESS NOTES
Received referral to lung cancer screening program.  Chart review to assess for lung cancer screening program eligibility.   1. Age 55-77 yrs of age? Yes 66 y.o.  2. 30 pack year hx of smoking, or greater? Yes 1 [id number] hx  3. Current smoker or if quit, has pt quit within last 15 yrs?Yes, current smoker- currently 1/2 ppd   4. Any signs or symptoms of lung cancer? None noted  5. Previous history of lung cancer? None noted  6. Chest CT within past 12 mos.? None noted  Patient DOES meet eligibility criteria. [facility] scheduling notified to schedule the shared decision making visit.

## 2018-02-16 ENCOUNTER — OFFICE VISIT (OUTPATIENT)
Dept: MEDICAL GROUP | Facility: MEDICAL CENTER | Age: 68
End: 2018-02-16
Payer: MEDICARE

## 2018-02-16 VITALS
SYSTOLIC BLOOD PRESSURE: 120 MMHG | RESPIRATION RATE: 14 BRPM | TEMPERATURE: 98.2 F | BODY MASS INDEX: 26.81 KG/M2 | OXYGEN SATURATION: 98 % | WEIGHT: 181 LBS | HEIGHT: 69 IN | HEART RATE: 73 BPM | DIASTOLIC BLOOD PRESSURE: 70 MMHG

## 2018-02-16 DIAGNOSIS — Z72.0 TOBACCO USE: ICD-10-CM

## 2018-02-16 DIAGNOSIS — R93.89 ABNORMAL CHEST CT: ICD-10-CM

## 2018-02-16 DIAGNOSIS — R73.03 PREDIABETES: ICD-10-CM

## 2018-02-16 DIAGNOSIS — R06.89 DYSPNEA AND RESPIRATORY ABNORMALITIES: ICD-10-CM

## 2018-02-16 DIAGNOSIS — I70.0 ATHEROSCLEROSIS OF AORTA (HCC): ICD-10-CM

## 2018-02-16 DIAGNOSIS — Z87.442 HISTORY OF KIDNEY STONES: ICD-10-CM

## 2018-02-16 DIAGNOSIS — R06.00 DYSPNEA AND RESPIRATORY ABNORMALITIES: ICD-10-CM

## 2018-02-16 DIAGNOSIS — E78.2 MIXED HYPERLIPIDEMIA WITH APOLIPOPROTEIN E4 VARIANT: ICD-10-CM

## 2018-02-16 DIAGNOSIS — M54.12 CERVICAL RADICULOPATHY: ICD-10-CM

## 2018-02-16 PROCEDURE — 99214 OFFICE O/P EST MOD 30 MIN: CPT | Performed by: FAMILY MEDICINE

## 2018-02-16 RX ORDER — ALBUTEROL SULFATE 90 UG/1
2 AEROSOL, METERED RESPIRATORY (INHALATION) EVERY 6 HOURS PRN
Qty: 8.5 G | Refills: 12 | Status: SHIPPED | OUTPATIENT
Start: 2018-02-16 | End: 2019-02-28 | Stop reason: SDUPTHER

## 2018-02-16 ASSESSMENT — PATIENT HEALTH QUESTIONNAIRE - PHQ9: CLINICAL INTERPRETATION OF PHQ2 SCORE: 0

## 2018-02-16 NOTE — ASSESSMENT & PLAN NOTE
This is a chronic health problem for this patient for which he is on secondary prevention. He stopped smoking, taking a daily aspirin and on a statin.

## 2018-02-16 NOTE — ASSESSMENT & PLAN NOTE
This is a chronic health problem for this patient which has not been reassessed since 2016. We will get baseline blood work and see him back. He is on rosuvastatin 10 mg daily along with that he had 10 mg daily.

## 2018-02-16 NOTE — ASSESSMENT & PLAN NOTE
This is a chronic health problem for this patient for which his last blood work showed that he had a blood sugar of 110. He does not have a family history of diabetes. We will check baseline blood work and get an A1c.

## 2018-02-16 NOTE — ASSESSMENT & PLAN NOTE
This is a chronic problem is gone on for a couple years and is worsening. Patient notes that he will develop a aching pain over the left upper shoulder radiating down to the mid arm sometimes present first thing in the morning when he gets up. It will last for hours and make it very difficult for him to try and write. He had previous EMGs and they could find nothing wrong. That was done a number of years ago. He has not had a recent workup for this.

## 2018-02-16 NOTE — ASSESSMENT & PLAN NOTE
This is a chronic health problem that is uncontrolled with current medications and lifestyle measures.  Patient typically smokes about a half pack per day but when there is times of stress or is watching his weight more closely. He will may smoke more. We are going to get a repeat CT of his lungs because he does have lung nodules that need to have follow-up. He's just over one year since the last one.

## 2018-02-16 NOTE — ASSESSMENT & PLAN NOTE
This is a chronic health problems for this patient he had a chest CT on 11/10/16 that showed 2 mm nodules scattered throughout both lungs which appear to be unchanged as well as atherosclerotic changes within the coronary arteries and the aortic valve as well as the aorta. It was recommended that the patient have a follow-up within one year which he did not do. We discussed today.

## 2018-02-17 NOTE — PROGRESS NOTES
CC: Abnormal chest CT, atherosclerosis of the aorta, prediabetes, cervical radiculopathy and other problems    HISTORY OF PRESENT ILLNESS: Patient is a 67 y.o. male established patient who presents today to discuss the chronic problems listed below. This patient previously has seen Dr. Hector Cornejo as well as Dr. Len Arcos. He is now establishing care here in the clinic. He has multiple chronic health problems that need to be addressed.    Health Maintenance: Completed    Abnormal chest CT  This is a chronic health problems for this patient he had a chest CT on 11/10/16 that showed 2 mm nodules scattered throughout both lungs which appear to be unchanged as well as atherosclerotic changes within the coronary arteries and the aortic valve as well as the aorta. It was recommended that the patient have a follow-up within one year which he did not do. We discussed today.    Atherosclerosis of aorta  This is a chronic health problem for this patient for which he is on secondary prevention. He stopped smoking, taking a daily aspirin and on a statin.    Mixed hyperlipidemia with apolipoprotein E4 variant  This is a chronic health problem for this patient which has not been reassessed since 2016. We will get baseline blood work and see him back. He is on rosuvastatin 10 mg daily along with that he had 10 mg daily.    Prediabetes  This is a chronic health problem for this patient for which his last blood work showed that he had a blood sugar of 110. He does not have a family history of diabetes. We will check baseline blood work and get an A1c.    Tobacco use  This is a chronic health problem that is uncontrolled with current medications and lifestyle measures.  Patient typically smokes about a half pack per day but when there is times of stress or is watching his weight more closely. He will may smoke more. We are going to get a repeat CT of his lungs because he does have lung nodules that need to have follow-up. He's just  over one year since the last one.    Cervical radiculopathy  This is a chronic problem is gone on for a couple years and is worsening. Patient notes that he will develop a aching pain over the left upper shoulder radiating down to the mid arm sometimes present first thing in the morning when he gets up. It will last for hours and make it very difficult for him to try and write. He had previous EMGs and they could find nothing wrong. That was done a number of years ago. He has not had a recent workup for this.      Patient Active Problem List    Diagnosis Date Noted   • Cervical radiculopathy 02/16/2018   • Encounter for screening for lung cancer 05/09/2017   • Coronary atherosclerosis due to calcified coronary lesion 12/14/2016   • Thoracic aorta atherosclerosis (CMS-HCC) 12/14/2016   • Mixed hyperlipidemia with apolipoprotein E4 variant 09/17/2014   • Heterozygous MTHFR mutation C677T (CMS-HCC) 09/17/2014   • Prediabetes 09/17/2014   • Metabolic syndrome 09/17/2014   • Atherosclerosis of both carotid arteries 08/11/2014   • Abnormal chest CT 08/11/2014   • Screening for osteoporosis 08/07/2014   • Primary osteoarthritis of right knee 10/22/2013   • Atherosclerosis of aorta (CMS-HCC) 10/22/2013   • HTN (hypertension) 10/22/2013   • Tobacco use 10/22/2013   • History of kidney stones       Allergies:Penicillamine; Cephalexin; Cephalosporins; and Peanut-derived    Current Outpatient Prescriptions   Medication Sig Dispense Refill   • beclomethasone (QVAR) 40 MCG/ACT inhaler Inhale 1 Puff by mouth 2 Times a Day. 1 Inhaler 12   • albuterol 108 (90 Base) MCG/ACT Aero Soln inhalation aerosol Inhale 2 Puffs by mouth every 6 hours as needed for Shortness of Breath. 8.5 g 12   • losartan (COZAAR) 100 MG Tab Take 1 Tab by mouth every day. 90 Tab 3   • amlodipine (NORVASC) 10 MG Tab Take 1 Tab by mouth every day. 90 Tab 3   • ezetimibe (ZETIA) 10 MG Tab Take 1 Tab by mouth every day. 100 Tab 4   • rosuvastatin (CRESTOR) 10 MG  Tab Take 1 Tab by mouth every evening. 100 Tab 4   • Cholecalciferol (VITAMIN D) 2000 UNITS Cap Take 1 Cap by mouth every day.     • Cyanocobalamin (B-12) 1000 MCG TBCR Take 1 Each by mouth every day.     • aspirin EC (ECOTRIN) 81 MG TBEC Take 1 Tab by mouth every day.       No current facility-administered medications for this visit.        Social History   Substance Use Topics   • Smoking status: Current Every Day Smoker     Packs/day: 1.00     Years: 50.00     Types: Cigarettes   • Smokeless tobacco: Never Used      Comment: 05/09/17 -- currently 1/2 pack per day   • Alcohol use 0.0 oz/week      Comment: 1x/month, 1 drink per time     Social History     Social History Narrative   • No narrative on file       Family History   Problem Relation Age of Onset   • Asthma Mother    • Heart Attack Mother    • Hypertension Father    • Stroke Father    • Dementia Father    • Lung Disease Father      heavy smoker   • Other Brother      Morbid Obesity   • Alcohol/Drug Brother      prescription drug problem   • Alcohol/Drug Brother      street drugs   • Cancer Paternal Uncle      Pancreatic   • Colon Cancer Cousin        Review of Systems:      - Constitutional: Negative for fever, chills, unexpected weight change, and fatigue/generalized weakness.     - HEENT: Negative for headaches, vision changes, hearing changes, ear pain, ear discharge, rhinorrhea, sinus congestion, sore throat, and neck pain.      - Respiratory: Positive for chronic dry cough he relates is a smoker's cough.       - Cardiovascular: Negative for chest pain, palpitations, orthopnea, and bilateral lower extremity edema.     - Gastrointestinal: Negative for heartburn, nausea, vomiting, abdominal pain, hematochezia, melena, diarrhea, constipation, and greasy/foul-smelling stools.     - Genitourinary: Negative for dysuria, polyuria, hematuria, pyuria, urinary urgency, and urinary incontinence.    - Musculoskeletal:Positive for left neck and shoulder pain  "radiating to the left upper arm.     - Skin: Negative for rash, itching, cyanotic skin color change.     - Neurological:Positive for cervical radiculopathy as described..     - Endo/Heme/Allergies: Does not bruise/bleed easily.     - Psychiatric/Behavioral: Negative for depression, suicidal/homicidal ideation and memory loss.          - NOTE: All other systems reviewed and are negative, except as in HPI.    Exam:    Blood pressure 120/70, pulse 73, temperature 36.8 °C (98.2 °F), resp. rate 14, height 1.753 m (5' 9.02\"), weight 82.1 kg (181 lb), SpO2 98 %. Body mass index is 26.71 kg/m².    General:  Well nourished, well developed male in NAD  LABS: 11/2016: Results reviewed and discussed with the patient, questions answered.    Patient was seen for 35 minutes face to face of which, 25 minutes was spent counseling regarding the above mentioned problems.  Assessment/Plan:  1. Abnormal chest CT  Uncontrolled, patient will proceed with repeat chest CT and see me back to go over results  - CT-CHEST (THORAX) WITH; Future    2. Atherosclerosis of aorta (CMS-HCC)  Currently stable but not fully participating in secondary prevention. Patient continues to smoke    3. History of kidney stones  Currently stable    4. Mixed hyperlipidemia with apolipoprotein E4 variant  Uncontrolled, we will get baseline blood work and see this patient back. I'm concerned that his cholesterol is not adequately controlled in light of his chronic health problems  - COMP METABOLIC PANEL; Future  - LIPID PROFILE; Future  - CBC WITH DIFFERENTIAL; Future  - TSH WITH REFLEX TO FT4; Future    5. Prediabetes  Uncontrolled, we will check baseline blood work and see the patient back  - HEMOGLOBIN A1C; Future    6. Tobacco use  Uncontrolled we discussed the fact that the patient needs to quit smoking but is not ready to do that at this time    7. Cervical radiculopathy  Uncontrolled, it appears the patient has a C6-7 radiculopathy. We will need to get " x-rays to start with and consider MRI  - DX-CERVICAL SPINE-2 OR 3 VIEWS; Future  - DX-CERVICAL SPINE-FLX-EXT ONLY; Future    8. Dyspnea and respiratory abnormalities  Uncontrolled, patient has not been utilizing his inhalers as prescribed. We reviewed the reasons he would utilize these and how to take them regularly. I think he would find that his cough improves greatly.  - beclomethasone (QVAR) 40 MCG/ACT inhaler; Inhale 1 Puff by mouth 2 Times a Day.  Dispense: 1 Inhaler; Refill: 12  - albuterol 108 (90 Base) MCG/ACT Aero Soln inhalation aerosol; Inhale 2 Puffs by mouth every 6 hours as needed for Shortness of Breath.  Dispense: 8.5 g; Refill: 12

## 2018-02-21 ENCOUNTER — HOSPITAL ENCOUNTER (OUTPATIENT)
Dept: RADIOLOGY | Facility: MEDICAL CENTER | Age: 68
End: 2018-02-21
Attending: FAMILY MEDICINE
Payer: MEDICARE

## 2018-02-21 ENCOUNTER — HOSPITAL ENCOUNTER (OUTPATIENT)
Dept: LAB | Facility: MEDICAL CENTER | Age: 68
End: 2018-02-21
Attending: FAMILY MEDICINE
Payer: MEDICARE

## 2018-02-21 DIAGNOSIS — M54.12 CERVICAL RADICULOPATHY: ICD-10-CM

## 2018-02-21 DIAGNOSIS — R73.03 PREDIABETES: ICD-10-CM

## 2018-02-21 DIAGNOSIS — E78.2 MIXED HYPERLIPIDEMIA WITH APOLIPOPROTEIN E4 VARIANT: ICD-10-CM

## 2018-02-21 LAB
ALBUMIN SERPL BCP-MCNC: 4.1 G/DL (ref 3.2–4.9)
ALBUMIN/GLOB SERPL: 1.7 G/DL
ALP SERPL-CCNC: 62 U/L (ref 30–99)
ALT SERPL-CCNC: 10 U/L (ref 2–50)
ANION GAP SERPL CALC-SCNC: 9 MMOL/L (ref 0–11.9)
AST SERPL-CCNC: 16 U/L (ref 12–45)
BASOPHILS # BLD AUTO: 0.8 % (ref 0–1.8)
BASOPHILS # BLD: 0.07 K/UL (ref 0–0.12)
BILIRUB SERPL-MCNC: 0.7 MG/DL (ref 0.1–1.5)
BUN SERPL-MCNC: 18 MG/DL (ref 8–22)
CALCIUM SERPL-MCNC: 9.1 MG/DL (ref 8.5–10.5)
CHLORIDE SERPL-SCNC: 107 MMOL/L (ref 96–112)
CHOLEST SERPL-MCNC: 104 MG/DL (ref 100–199)
CO2 SERPL-SCNC: 26 MMOL/L (ref 20–33)
CREAT SERPL-MCNC: 1.32 MG/DL (ref 0.5–1.4)
EOSINOPHIL # BLD AUTO: 0.3 K/UL (ref 0–0.51)
EOSINOPHIL NFR BLD: 3.6 % (ref 0–6.9)
ERYTHROCYTE [DISTWIDTH] IN BLOOD BY AUTOMATED COUNT: 46.5 FL (ref 35.9–50)
EST. AVERAGE GLUCOSE BLD GHB EST-MCNC: 123 MG/DL
GLOBULIN SER CALC-MCNC: 2.4 G/DL (ref 1.9–3.5)
GLUCOSE SERPL-MCNC: 112 MG/DL (ref 65–99)
HBA1C MFR BLD: 5.9 % (ref 0–5.6)
HCT VFR BLD AUTO: 46.3 % (ref 42–52)
HDLC SERPL-MCNC: 54 MG/DL
HGB BLD-MCNC: 14.9 G/DL (ref 14–18)
IMM GRANULOCYTES # BLD AUTO: 0.02 K/UL (ref 0–0.11)
IMM GRANULOCYTES NFR BLD AUTO: 0.2 % (ref 0–0.9)
LDLC SERPL CALC-MCNC: 37 MG/DL
LYMPHOCYTES # BLD AUTO: 1.84 K/UL (ref 1–4.8)
LYMPHOCYTES NFR BLD: 22.1 % (ref 22–41)
MCH RBC QN AUTO: 31.2 PG (ref 27–33)
MCHC RBC AUTO-ENTMCNC: 32.2 G/DL (ref 33.7–35.3)
MCV RBC AUTO: 97.1 FL (ref 81.4–97.8)
MONOCYTES # BLD AUTO: 0.69 K/UL (ref 0–0.85)
MONOCYTES NFR BLD AUTO: 8.3 % (ref 0–13.4)
NEUTROPHILS # BLD AUTO: 5.41 K/UL (ref 1.82–7.42)
NEUTROPHILS NFR BLD: 65 % (ref 44–72)
NRBC # BLD AUTO: 0 K/UL
NRBC BLD-RTO: 0 /100 WBC
PLATELET # BLD AUTO: 164 K/UL (ref 164–446)
PMV BLD AUTO: 12.1 FL (ref 9–12.9)
POTASSIUM SERPL-SCNC: 4.6 MMOL/L (ref 3.6–5.5)
PROT SERPL-MCNC: 6.5 G/DL (ref 6–8.2)
RBC # BLD AUTO: 4.77 M/UL (ref 4.7–6.1)
SODIUM SERPL-SCNC: 142 MMOL/L (ref 135–145)
TRIGL SERPL-MCNC: 63 MG/DL (ref 0–149)
TSH SERPL DL<=0.005 MIU/L-ACNC: 1.99 UIU/ML (ref 0.38–5.33)
WBC # BLD AUTO: 8.3 K/UL (ref 4.8–10.8)

## 2018-02-21 PROCEDURE — 83036 HEMOGLOBIN GLYCOSYLATED A1C: CPT | Mod: GA

## 2018-02-21 PROCEDURE — 36415 COLL VENOUS BLD VENIPUNCTURE: CPT

## 2018-02-21 PROCEDURE — 85025 COMPLETE CBC W/AUTO DIFF WBC: CPT

## 2018-02-21 PROCEDURE — 84443 ASSAY THYROID STIM HORMONE: CPT

## 2018-02-21 PROCEDURE — 72050 X-RAY EXAM NECK SPINE 4/5VWS: CPT

## 2018-02-21 PROCEDURE — 80053 COMPREHEN METABOLIC PANEL: CPT

## 2018-02-21 PROCEDURE — 80061 LIPID PANEL: CPT

## 2018-02-24 ENCOUNTER — HOSPITAL ENCOUNTER (OUTPATIENT)
Dept: RADIOLOGY | Facility: MEDICAL CENTER | Age: 68
End: 2018-02-24
Attending: FAMILY MEDICINE
Payer: MEDICARE

## 2018-02-24 DIAGNOSIS — R93.89 ABNORMAL CHEST CT: ICD-10-CM

## 2018-02-24 PROCEDURE — 700117 HCHG RX CONTRAST REV CODE 255: Performed by: FAMILY MEDICINE

## 2018-02-24 PROCEDURE — 71260 CT THORAX DX C+: CPT

## 2018-02-24 RX ADMIN — IOHEXOL 75 ML: 350 INJECTION, SOLUTION INTRAVENOUS at 15:15

## 2018-03-30 ENCOUNTER — OFFICE VISIT (OUTPATIENT)
Dept: MEDICAL GROUP | Facility: MEDICAL CENTER | Age: 68
End: 2018-03-30
Payer: MEDICARE

## 2018-03-30 VITALS
HEIGHT: 69 IN | TEMPERATURE: 98.2 F | BODY MASS INDEX: 27.11 KG/M2 | RESPIRATION RATE: 14 BRPM | WEIGHT: 183 LBS | SYSTOLIC BLOOD PRESSURE: 120 MMHG | HEART RATE: 75 BPM | OXYGEN SATURATION: 98 % | DIASTOLIC BLOOD PRESSURE: 50 MMHG

## 2018-03-30 DIAGNOSIS — K76.0 NAFL (NONALCOHOLIC FATTY LIVER): ICD-10-CM

## 2018-03-30 DIAGNOSIS — E78.2 MIXED HYPERLIPIDEMIA WITH APOLIPOPROTEIN E4 VARIANT: ICD-10-CM

## 2018-03-30 DIAGNOSIS — I10 ESSENTIAL HYPERTENSION: ICD-10-CM

## 2018-03-30 DIAGNOSIS — R73.03 PREDIABETES: ICD-10-CM

## 2018-03-30 DIAGNOSIS — R93.89 ABNORMAL CHEST CT: ICD-10-CM

## 2018-03-30 DIAGNOSIS — Z72.0 TOBACCO USE: ICD-10-CM

## 2018-03-30 DIAGNOSIS — I70.0 ATHEROSCLEROSIS OF AORTA (HCC): ICD-10-CM

## 2018-03-30 PROCEDURE — 99214 OFFICE O/P EST MOD 30 MIN: CPT | Performed by: FAMILY MEDICINE

## 2018-03-30 NOTE — ASSESSMENT & PLAN NOTE
This is a chronic health problem for this patient that stable. We will continue to follow. Continue to utilize secondary prevention with daily aspirin and Crestor.

## 2018-03-30 NOTE — ASSESSMENT & PLAN NOTE
This is a chronic health problem that is well controlled with current medications and lifestyle measures. Blood pressure today is excellent at 120/50. The patient denies chest pain, shortness of breath or dyspnea on exertion.

## 2018-03-30 NOTE — ASSESSMENT & PLAN NOTE
This is a chronic health problem for this patient that is tried multiple times to quit and been unable to do that. He continues to smoke but continues to try and cut down.

## 2018-03-30 NOTE — ASSESSMENT & PLAN NOTE
This is a chronic health problem that was found on the patient's lab work. His fasting glucose is elevated at 112 and his A1c is slightly elevated at 5.9. Discussed that having an elevated blood sugar also puts him at a higher risk for fatty infiltrate of the liver which is documented on his CT scan. Patient is going to work on trying to lose about 5 pounds over the coming 3 months and getting regular exercise which she is not currently doing. Plan will be to recheck in 3 months.

## 2018-03-30 NOTE — ASSESSMENT & PLAN NOTE
This is a chronic health problem that has gotten much better. Patient's cholesterol is down to 104, triglycerides are down to 63, HDL good at 54 and his LDL is down to 37. He will continue with his Crestor. No liver dysfunction is identified.

## 2018-03-30 NOTE — PROGRESS NOTES
CC:Diagnoses of Essential hypertension, Mixed hyperlipidemia with apolipoprotein E4 variant, Prediabetes, Atherosclerosis of aorta (CMS-HCC), Tobacco use, Abnormal chest CT, and NAFL (nonalcoholic fatty liver) were pertinent to this visit.      HISTORY OF PRESENT ILLNESS: Patient is a 67 y.o. male established patient who presents today to follow-up on blood work, CT scan of chest and cervical spine x-rays. We discussed that the CT scan of the chest shows only one change from his previous in 2016 and that is the fact that he developed fatty infiltrate of the liver. Patient is planning to work on this. His cervical spine x-rays shows that he has degenerative arthritis in level C4-C7. This is not particularly changed from his previous x-rays.    Health Maintenance: Completed    HTN (hypertension)  This is a chronic health problem that is well controlled with current medications and lifestyle measures. Blood pressure today is excellent at 120/50. The patient denies chest pain, shortness of breath or dyspnea on exertion.      Mixed hyperlipidemia with apolipoprotein E4 variant  This is a chronic health problem that has gotten much better. Patient's cholesterol is down to 104, triglycerides are down to 63, HDL good at 54 and his LDL is down to 37. He will continue with his Crestor. No liver dysfunction is identified.    Prediabetes  This is a chronic health problem that was found on the patient's lab work. His fasting glucose is elevated at 112 and his A1c is slightly elevated at 5.9. Discussed that having an elevated blood sugar also puts him at a higher risk for fatty infiltrate of the liver which is documented on his CT scan. Patient is going to work on trying to lose about 5 pounds over the coming 3 months and getting regular exercise which she is not currently doing. Plan will be to recheck in 3 months.    Atherosclerosis of aorta  This is a chronic health problem for this patient that stable. We will continue to  follow. Continue to utilize secondary prevention with daily aspirin and Crestor.    Tobacco use  This is a chronic health problem for this patient that is tried multiple times to quit and been unable to do that. He continues to smoke but continues to try and cut down.    Abnormal chest CT  Patient had his follow-up chest CT and is here today to discuss those results. He has multiple stable small 2-3 mm pulmonary nodules bilaterally without any significant interval change. There is a stable 1.2 mm left adrenal nodule that is unchanged from 2016. Evidently since 2016 there is been fatty liver development. Patient I spent time discussing this and ways to get rid of it and why he wants to get rid of it.      Patient Active Problem List    Diagnosis Date Noted   • NAFL (nonalcoholic fatty liver) 03/30/2018   • Cervical radiculopathy 02/16/2018   • Encounter for screening for lung cancer 05/09/2017   • Coronary atherosclerosis due to calcified coronary lesion 12/14/2016   • Thoracic aorta atherosclerosis (CMS-HCC) 12/14/2016   • Mixed hyperlipidemia with apolipoprotein E4 variant 09/17/2014   • Heterozygous MTHFR mutation C677T (CMS-HCC) 09/17/2014   • Prediabetes 09/17/2014   • Metabolic syndrome 09/17/2014   • Atherosclerosis of both carotid arteries 08/11/2014   • Abnormal chest CT 08/11/2014   • Screening for osteoporosis 08/07/2014   • Primary osteoarthritis of right knee 10/22/2013   • Atherosclerosis of aorta (CMS-HCC) 10/22/2013   • HTN (hypertension) 10/22/2013   • Tobacco use 10/22/2013   • History of kidney stones       Allergies:Penicillamine; Cephalexin; Cephalosporins; and Peanut-derived    Current Outpatient Prescriptions   Medication Sig Dispense Refill   • beclomethasone (QVAR) 40 MCG/ACT inhaler Inhale 1 Puff by mouth 2 Times a Day. 1 Inhaler 12   • albuterol 108 (90 Base) MCG/ACT Aero Soln inhalation aerosol Inhale 2 Puffs by mouth every 6 hours as needed for Shortness of Breath. 8.5 g 12   • losartan  (COZAAR) 100 MG Tab Take 1 Tab by mouth every day. 90 Tab 3   • amlodipine (NORVASC) 10 MG Tab Take 1 Tab by mouth every day. 90 Tab 3   • ezetimibe (ZETIA) 10 MG Tab Take 1 Tab by mouth every day. 100 Tab 4   • rosuvastatin (CRESTOR) 10 MG Tab Take 1 Tab by mouth every evening. 100 Tab 4   • Cholecalciferol (VITAMIN D) 2000 UNITS Cap Take 1 Cap by mouth every day.     • Cyanocobalamin (B-12) 1000 MCG TBCR Take 1 Each by mouth every day.     • aspirin EC (ECOTRIN) 81 MG TBEC Take 1 Tab by mouth every day.       No current facility-administered medications for this visit.        Social History   Substance Use Topics   • Smoking status: Current Every Day Smoker     Packs/day: 1.00     Years: 50.00     Types: Cigarettes   • Smokeless tobacco: Never Used      Comment: 05/09/17 -- currently 1/2 pack per day   • Alcohol use 0.0 oz/week      Comment: 1x/month, 1 drink per time     Social History     Social History Narrative   • No narrative on file       Family History   Problem Relation Age of Onset   • Asthma Mother    • Heart Attack Mother    • Hypertension Father    • Stroke Father    • Dementia Father    • Lung Disease Father      heavy smoker   • Other Brother      Morbid Obesity   • Alcohol/Drug Brother      prescription drug problem   • Alcohol/Drug Brother      street drugs   • Cancer Paternal Uncle      Pancreatic   • Colon Cancer Cousin        Review of Systems:      - Constitutional: Negative for fever, chills, unexpected weight change, and fatigue/generalized weakness.     - HEENT: Negative for headaches, vision changes, hearing changes, ear pain, ear discharge, rhinorrhea, sinus congestion, sore throat, and neck pain.      - Respiratory: Patient has a chronic cough typically not productive related to smoking. Negative for dyspnea, wheezing, and crackles.      - Cardiovascular: Negative for chest pain, palpitations, orthopnea, and bilateral lower extremity edema.     - Gastrointestinal: Negative for heartburn,  "nausea, vomiting, abdominal pain, hematochezia, melena, diarrhea, constipation, and greasy/foul-smelling stools.     - Genitourinary: Negative for dysuria, polyuria, hematuria, pyuria, urinary urgency, and urinary incontinence.    - Musculoskeletal: Positive for left shoulder pain and numbness from cervical spine degenerative arthritis. Negative for myalgias, back pain, and joint pain.     - Skin: Negative for rash, itching, cyanotic skin color change.     - Neurological: Negative for dizziness, tingling, tremors, focal sensory deficit, focal weakness and headaches.     - Endo/Heme/Allergies: Does not bruise/bleed easily.     - Psychiatric/Behavioral: Negative for depression, suicidal/homicidal ideation and memory loss.          - NOTE: All other systems reviewed and are negative, except as in HPI.    Exam:    Blood pressure 120/50, pulse 75, temperature 36.8 °C (98.2 °F), resp. rate 14, height 1.753 m (5' 9.02\"), weight 83 kg (183 lb), SpO2 98 %. Body mass index is 27.01 kg/m².    General:  Well nourished, well developed male in NAD  LABS: 2/21/18: Results reviewed and discussed with the patient, questions answered.    Patient was seen for 35 minutes face to face of which, 25 minutes was spent counseling regarding the above mentioned problems.  Assessment/Plan:  1. Essential hypertension  Controlled, continue with current meds and lifestyle.      2. Mixed hyperlipidemia with apolipoprotein E4 variant  Controlled, continue with current meds and lifestyle.      3. Prediabetes  Uncontrolled, patient is going to work on diet and regular exercise at least 5 days a week to try and get rid of the fatty infiltrate of his liver as well as the elevated blood sugar.  - COMP METABOLIC PANEL; Future  - HEMOGLOBIN A1C; Future    4. Atherosclerosis of aorta (CMS-HCC)  Stable    5. Tobacco use  Uncontrolled, patient continues to smoke    6. Abnormal chest CT  Stable, we will continue to follow in another year ordering the test " sometime around Sandy's Day 2019    7. NAFL (nonalcoholic fatty liver)  Uncontrolled, patient going to work on trying to lose weight and getting the fat out of his liver.

## 2018-03-30 NOTE — ASSESSMENT & PLAN NOTE
Patient had his follow-up chest CT and is here today to discuss those results. He has multiple stable small 2-3 mm pulmonary nodules bilaterally without any significant interval change. There is a stable 1.2 mm left adrenal nodule that is unchanged from 2016. Evidently since 2016 there is been fatty liver development. Patient I spent time discussing this and ways to get rid of it and why he wants to get rid of it.

## 2018-04-30 DIAGNOSIS — I10 ESSENTIAL HYPERTENSION: ICD-10-CM

## 2018-04-30 RX ORDER — LOSARTAN POTASSIUM 100 MG/1
100 TABLET ORAL DAILY
Qty: 90 TAB | Refills: 3 | Status: SHIPPED | OUTPATIENT
Start: 2018-04-30 | End: 2019-02-28 | Stop reason: SDUPTHER

## 2018-06-18 DIAGNOSIS — I10 ESSENTIAL HYPERTENSION: ICD-10-CM

## 2018-06-18 RX ORDER — AMLODIPINE BESYLATE 10 MG/1
10 TABLET ORAL DAILY
Qty: 90 TAB | Refills: 2 | Status: SHIPPED | OUTPATIENT
Start: 2018-06-18 | End: 2019-02-28 | Stop reason: SDUPTHER

## 2018-06-25 ENCOUNTER — HOSPITAL ENCOUNTER (OUTPATIENT)
Dept: LAB | Facility: MEDICAL CENTER | Age: 68
End: 2018-06-25
Attending: FAMILY MEDICINE
Payer: MEDICARE

## 2018-06-25 DIAGNOSIS — R73.03 PREDIABETES: ICD-10-CM

## 2018-06-25 LAB
ALBUMIN SERPL BCP-MCNC: 3.9 G/DL (ref 3.2–4.9)
ALBUMIN/GLOB SERPL: 1.4 G/DL
ALP SERPL-CCNC: 53 U/L (ref 30–99)
ALT SERPL-CCNC: 9 U/L (ref 2–50)
ANION GAP SERPL CALC-SCNC: 6 MMOL/L (ref 0–11.9)
AST SERPL-CCNC: 16 U/L (ref 12–45)
BILIRUB SERPL-MCNC: 0.7 MG/DL (ref 0.1–1.5)
BUN SERPL-MCNC: 15 MG/DL (ref 8–22)
CALCIUM SERPL-MCNC: 9.1 MG/DL (ref 8.5–10.5)
CHLORIDE SERPL-SCNC: 109 MMOL/L (ref 96–112)
CO2 SERPL-SCNC: 26 MMOL/L (ref 20–33)
CREAT SERPL-MCNC: 1.18 MG/DL (ref 0.5–1.4)
EST. AVERAGE GLUCOSE BLD GHB EST-MCNC: 126 MG/DL
GLOBULIN SER CALC-MCNC: 2.8 G/DL (ref 1.9–3.5)
GLUCOSE SERPL-MCNC: 110 MG/DL (ref 65–99)
HBA1C MFR BLD: 6 % (ref 0–5.6)
POTASSIUM SERPL-SCNC: 5 MMOL/L (ref 3.6–5.5)
PROT SERPL-MCNC: 6.7 G/DL (ref 6–8.2)
SODIUM SERPL-SCNC: 141 MMOL/L (ref 135–145)

## 2018-06-25 PROCEDURE — 80053 COMPREHEN METABOLIC PANEL: CPT

## 2018-06-25 PROCEDURE — 83036 HEMOGLOBIN GLYCOSYLATED A1C: CPT

## 2018-06-25 PROCEDURE — 36415 COLL VENOUS BLD VENIPUNCTURE: CPT

## 2018-06-28 ENCOUNTER — OFFICE VISIT (OUTPATIENT)
Dept: MEDICAL GROUP | Facility: MEDICAL CENTER | Age: 68
End: 2018-06-28
Payer: MEDICARE

## 2018-06-28 VITALS
TEMPERATURE: 98.2 F | HEIGHT: 69 IN | RESPIRATION RATE: 14 BRPM | HEART RATE: 92 BPM | OXYGEN SATURATION: 95 % | WEIGHT: 179 LBS | SYSTOLIC BLOOD PRESSURE: 120 MMHG | DIASTOLIC BLOOD PRESSURE: 68 MMHG | BODY MASS INDEX: 26.51 KG/M2

## 2018-06-28 DIAGNOSIS — R73.03 PREDIABETES: ICD-10-CM

## 2018-06-28 DIAGNOSIS — Z72.0 TOBACCO USE: ICD-10-CM

## 2018-06-28 DIAGNOSIS — I77.6 INFLAMMATION OF ARTERIES (HCC): ICD-10-CM

## 2018-06-28 DIAGNOSIS — E78.2 MIXED HYPERLIPIDEMIA WITH APOLIPOPROTEIN E4 VARIANT: ICD-10-CM

## 2018-06-28 DIAGNOSIS — I10 ESSENTIAL HYPERTENSION: ICD-10-CM

## 2018-06-28 DIAGNOSIS — I70.0 ATHEROSCLEROSIS OF AORTA (HCC): ICD-10-CM

## 2018-06-28 PROCEDURE — 99214 OFFICE O/P EST MOD 30 MIN: CPT | Performed by: FAMILY MEDICINE

## 2018-06-28 RX ORDER — FLUTICASONE PROPIONATE 110 UG/1
2 AEROSOL, METERED RESPIRATORY (INHALATION) 2 TIMES DAILY
Qty: 1 INHALER | Refills: 11 | Status: SHIPPED | OUTPATIENT
Start: 2018-06-28 | End: 2018-07-05

## 2018-06-28 RX ORDER — EZETIMIBE 10 MG/1
10 TABLET ORAL DAILY
Qty: 100 TAB | Refills: 4 | Status: SHIPPED | OUTPATIENT
Start: 2018-06-28 | End: 2018-07-05 | Stop reason: SDUPTHER

## 2018-06-28 RX ORDER — ROSUVASTATIN CALCIUM 10 MG/1
10 TABLET, COATED ORAL EVERY EVENING
Qty: 100 TAB | Refills: 4 | Status: SHIPPED | OUTPATIENT
Start: 2018-06-28 | End: 2018-07-05 | Stop reason: SDUPTHER

## 2018-06-28 NOTE — ASSESSMENT & PLAN NOTE
This is a chronic health problem that is well controlled with current medications and lifestyle measures. Patient will continue with current meds.  BP is good 120/68.

## 2018-06-28 NOTE — ASSESSMENT & PLAN NOTE
This is a chronic health problem that is well controlled with current medications and lifestyle measures. We will recheck labs in 3 months

## 2018-06-28 NOTE — PROGRESS NOTES
CC:Diagnoses of Atherosclerosis of aorta (HCC), Essential hypertension, Mixed hyperlipidemia with apolipoprotein E4 variant, Inflammation of arteries (HCC), Prediabetes, and Tobacco use were pertinent to this visit.      HISTORY OF PRESENT ILLNESS: Patient is a 67 y.o. male established patient who presents today to follow-up on his chronic health problems as outlined below.  He did his blood work on 6/25/18 would like to review those results.    Health Maintenance: Completed    Atherosclerosis of aorta  This is a chronic health problem that is well controlled with current medications and lifestyle measures. Patient continues on blood pressure control, aspirin and statins. Doing well. Has also done a better job with working on relaxation.    HTN (hypertension)  This is a chronic health problem that is well controlled with current medications and lifestyle measures. Patient will continue with current meds.  BP is good 120/68.    Mixed hyperlipidemia with apolipoprotein E4 variant  This is a chronic health problem that is well controlled with current medications and lifestyle measures. We will recheck labs in 3 months    Prediabetes  This is a chronic health problem that is well controlled with current medications and lifestyle measures. His fasting glucose has improved coming down from 112-110 and his A1c is excellent at 6.0. We will plan to go out for months before we recheck things. Patient is going to continue to work on relaxation and more exercise less work.    Tobacco use  This is a chronic problem that is unchanged.  He is still smoking and will continue to work on .       Patient Active Problem List    Diagnosis Date Noted   • NAFL (nonalcoholic fatty liver) 03/30/2018   • Cervical radiculopathy 02/16/2018   • Encounter for screening for lung cancer 05/09/2017   • Coronary atherosclerosis due to calcified coronary lesion 12/14/2016   • Thoracic aorta atherosclerosis (HCC) 12/14/2016   • Mixed hyperlipidemia  with apolipoprotein E4 variant 09/17/2014   • Heterozygous MTHFR mutation C677T (HCC) 09/17/2014   • Prediabetes 09/17/2014   • Metabolic syndrome 09/17/2014   • Atherosclerosis of both carotid arteries 08/11/2014   • Abnormal chest CT 08/11/2014   • Screening for osteoporosis 08/07/2014   • Primary osteoarthritis of right knee 10/22/2013   • Atherosclerosis of aorta (HCC) 10/22/2013   • HTN (hypertension) 10/22/2013   • Tobacco use 10/22/2013   • History of kidney stones       Allergies:Penicillamine; Cephalexin; Cephalosporins; and Peanut-derived    Current Outpatient Prescriptions   Medication Sig Dispense Refill   • ezetimibe (ZETIA) 10 MG Tab Take 1 Tab by mouth every day. 100 Tab 4   • rosuvastatin (CRESTOR) 10 MG Tab Take 1 Tab by mouth every evening. 100 Tab 4   • fluticasone (FLOVENT HFA) 110 MCG/ACT Aerosol Inhale 2 Puffs by mouth 2 times a day. 1 Inhaler 11   • amLODIPine (NORVASC) 10 MG Tab Take 1 Tab by mouth every day. 90 Tab 2   • losartan (COZAAR) 100 MG Tab Take 1 Tab by mouth every day. 90 Tab 3   • albuterol 108 (90 Base) MCG/ACT Aero Soln inhalation aerosol Inhale 2 Puffs by mouth every 6 hours as needed for Shortness of Breath. 8.5 g 12   • Cholecalciferol (VITAMIN D) 2000 UNITS Cap Take 1 Cap by mouth every day.     • Cyanocobalamin (B-12) 1000 MCG TBCR Take 1 Each by mouth every day.     • aspirin EC (ECOTRIN) 81 MG TBEC Take 1 Tab by mouth every day.       No current facility-administered medications for this visit.        Social History   Substance Use Topics   • Smoking status: Current Every Day Smoker     Packs/day: 1.00     Years: 50.00     Types: Cigarettes   • Smokeless tobacco: Never Used      Comment: 05/09/17 -- currently 1/2 pack per day   • Alcohol use 0.0 oz/week      Comment: 1x/month, 1 drink per time     Social History     Social History Narrative   • No narrative on file       Family History   Problem Relation Age of Onset   • Asthma Mother    • Heart Attack Mother    •  "Hypertension Father    • Stroke Father    • Dementia Father    • Lung Disease Father      heavy smoker   • Other Brother      Morbid Obesity   • Alcohol/Drug Brother      prescription drug problem   • Alcohol/Drug Brother      street drugs   • Cancer Paternal Uncle      Pancreatic   • Colon Cancer Cousin        Review of Systems:      - Constitutional: Negative for fever, chills, unexpected weight change, and fatigue/generalized weakness.     - HEENT: Negative for headaches, vision changes, hearing changes, ear pain, ear discharge, rhinorrhea, sinus congestion, sore throat, and neck pain.      - Respiratory: Negative for cough, sputum production, chest congestion, dyspnea, wheezing, and crackles.      - Cardiovascular: Negative for chest pain, palpitations, orthopnea, and bilateral lower extremity edema.     - Gastrointestinal: Negative for heartburn, nausea, vomiting, abdominal pain, hematochezia, melena, diarrhea, constipation, and greasy/foul-smelling stools.     - Genitourinary: Negative for dysuria, polyuria, hematuria, pyuria, urinary urgency, and urinary incontinence.    - Musculoskeletal: Patient continues to have left upper shoulder pain and tingling but not true numbness.  Otherwise denies myalgias, back pain, and joint pain.     - Skin: Negative for rash, itching, cyanotic skin color change.     - Neurological: Negative for dizziness, tingling, tremors, focal sensory deficit, focal weakness and headaches.     - Endo/Heme/Allergies: Does not bruise/bleed easily.     - Psychiatric/Behavioral: Negative for depression, suicidal/homicidal ideation and memory loss.          - NOTE: All other systems reviewed and are negative, except as in HPI.    Exam:    Blood pressure 120/68, pulse 92, temperature 36.8 °C (98.2 °F), resp. rate 14, height 1.753 m (5' 9.02\"), weight 81.2 kg (179 lb), SpO2 95 %. Body mass index is 26.42 kg/m².    General:  Well nourished, well developed male in NAD  LABS: 6/25/18: Results " reviewed and discussed with the patient, questions answered.    Patient was seen for 25 minutes face to face of which, 20 minutes was spent counseling regarding the above mentioned problems.  Assessment/Plan:  1. Atherosclerosis of aorta (HCC)  Stable, patient will continue with lifestyle management.  Once again he is encouraged to stop smoking    2. Essential hypertension  Controlled, continue with current meds and lifestyle.      3. Mixed hyperlipidemia with apolipoprotein E4 variant  Controlled, continue with current meds and lifestyle.  We will renew the patient's medications and then see him back in 4 months with labs prior to the visit  - ezetimibe (ZETIA) 10 MG Tab; Take 1 Tab by mouth every day.  Dispense: 100 Tab; Refill: 4  - rosuvastatin (CRESTOR) 10 MG Tab; Take 1 Tab by mouth every evening.  Dispense: 100 Tab; Refill: 4  - COMP METABOLIC PANEL; Future  - LIPID PROFILE; Future    4. Inflammation of arteries (HCC)  Controlled, continue with current meds and lifestyle.    - rosuvastatin (CRESTOR) 10 MG Tab; Take 1 Tab by mouth every evening.  Dispense: 100 Tab; Refill: 4    5. Prediabetes  Controlled, continue with current meds and lifestyle.    - HEMOGLOBIN A1C; Future    6. Tobacco use  Uncontrolled, unfortunately this patient continues to smoke at the same level but he continues to consider quitting and I am hopeful we will get him to quit in the coming year.

## 2018-06-28 NOTE — ASSESSMENT & PLAN NOTE
This is a chronic health problem that is well controlled with current medications and lifestyle measures. His fasting glucose has improved coming down from 112-110 and his A1c is excellent at 6.0. We will plan to go out for months before we recheck things. Patient is going to continue to work on relaxation and more exercise less work.

## 2018-06-28 NOTE — ASSESSMENT & PLAN NOTE
This is a chronic health problem that is well controlled with current medications and lifestyle measures. Patient continues on blood pressure control, aspirin and statins. Doing well. Has also done a better job with working on relaxation.

## 2018-06-29 ENCOUNTER — PATIENT MESSAGE (OUTPATIENT)
Dept: MEDICAL GROUP | Facility: MEDICAL CENTER | Age: 68
End: 2018-06-29

## 2018-06-29 DIAGNOSIS — R06.89 DYSPNEA AND RESPIRATORY ABNORMALITIES: ICD-10-CM

## 2018-06-29 DIAGNOSIS — I77.6 INFLAMMATION OF ARTERIES (HCC): ICD-10-CM

## 2018-06-29 DIAGNOSIS — R06.00 DYSPNEA AND RESPIRATORY ABNORMALITIES: ICD-10-CM

## 2018-06-29 DIAGNOSIS — E78.2 MIXED HYPERLIPIDEMIA WITH APOLIPOPROTEIN E4 VARIANT: ICD-10-CM

## 2018-07-05 RX ORDER — ROSUVASTATIN CALCIUM 10 MG/1
10 TABLET, COATED ORAL EVERY EVENING
Qty: 100 TAB | Refills: 4 | Status: SHIPPED | OUTPATIENT
Start: 2018-07-05 | End: 2019-02-28 | Stop reason: SDUPTHER

## 2018-07-05 RX ORDER — EZETIMIBE 10 MG/1
10 TABLET ORAL DAILY
Qty: 100 TAB | Refills: 4 | Status: SHIPPED | OUTPATIENT
Start: 2018-07-05 | End: 2019-02-28 | Stop reason: SDUPTHER

## 2018-10-24 ENCOUNTER — HOSPITAL ENCOUNTER (OUTPATIENT)
Dept: LAB | Facility: MEDICAL CENTER | Age: 68
End: 2018-10-24
Attending: FAMILY MEDICINE
Payer: MEDICARE

## 2018-10-24 DIAGNOSIS — R73.03 PREDIABETES: ICD-10-CM

## 2018-10-24 DIAGNOSIS — E78.2 MIXED HYPERLIPIDEMIA WITH APOLIPOPROTEIN E4 VARIANT: ICD-10-CM

## 2018-10-24 LAB
ALBUMIN SERPL BCP-MCNC: 4.7 G/DL (ref 3.2–4.9)
ALBUMIN/GLOB SERPL: 1.9 G/DL
ALP SERPL-CCNC: 70 U/L (ref 30–99)
ALT SERPL-CCNC: 14 U/L (ref 2–50)
ANION GAP SERPL CALC-SCNC: 7 MMOL/L (ref 0–11.9)
AST SERPL-CCNC: 16 U/L (ref 12–45)
BILIRUB SERPL-MCNC: 0.6 MG/DL (ref 0.1–1.5)
BUN SERPL-MCNC: 18 MG/DL (ref 8–22)
CALCIUM SERPL-MCNC: 9.8 MG/DL (ref 8.5–10.5)
CHLORIDE SERPL-SCNC: 106 MMOL/L (ref 96–112)
CHOLEST SERPL-MCNC: 111 MG/DL (ref 100–199)
CO2 SERPL-SCNC: 26 MMOL/L (ref 20–33)
CREAT SERPL-MCNC: 1.31 MG/DL (ref 0.5–1.4)
EST. AVERAGE GLUCOSE BLD GHB EST-MCNC: 126 MG/DL
FASTING STATUS PATIENT QL REPORTED: NORMAL
GLOBULIN SER CALC-MCNC: 2.5 G/DL (ref 1.9–3.5)
GLUCOSE SERPL-MCNC: 121 MG/DL (ref 65–99)
HBA1C MFR BLD: 6 % (ref 0–5.6)
HDLC SERPL-MCNC: 55 MG/DL
LDLC SERPL CALC-MCNC: 42 MG/DL
POTASSIUM SERPL-SCNC: 4.2 MMOL/L (ref 3.6–5.5)
PROT SERPL-MCNC: 7.2 G/DL (ref 6–8.2)
SODIUM SERPL-SCNC: 139 MMOL/L (ref 135–145)
TRIGL SERPL-MCNC: 71 MG/DL (ref 0–149)

## 2018-10-24 PROCEDURE — 80053 COMPREHEN METABOLIC PANEL: CPT

## 2018-10-24 PROCEDURE — 36415 COLL VENOUS BLD VENIPUNCTURE: CPT

## 2018-10-24 PROCEDURE — 83036 HEMOGLOBIN GLYCOSYLATED A1C: CPT | Mod: GA

## 2018-10-24 PROCEDURE — 80061 LIPID PANEL: CPT

## 2018-10-29 ENCOUNTER — OFFICE VISIT (OUTPATIENT)
Dept: MEDICAL GROUP | Facility: MEDICAL CENTER | Age: 68
End: 2018-10-29
Payer: MEDICARE

## 2018-10-29 VITALS
RESPIRATION RATE: 12 BRPM | TEMPERATURE: 98.2 F | OXYGEN SATURATION: 96 % | WEIGHT: 176.59 LBS | BODY MASS INDEX: 26.16 KG/M2 | SYSTOLIC BLOOD PRESSURE: 126 MMHG | DIASTOLIC BLOOD PRESSURE: 64 MMHG | HEIGHT: 69 IN | HEART RATE: 71 BPM

## 2018-10-29 DIAGNOSIS — I10 ESSENTIAL HYPERTENSION: ICD-10-CM

## 2018-10-29 DIAGNOSIS — I70.0 ATHEROSCLEROSIS OF AORTA (HCC): ICD-10-CM

## 2018-10-29 DIAGNOSIS — R73.03 PREDIABETES: ICD-10-CM

## 2018-10-29 DIAGNOSIS — E78.2 MIXED HYPERLIPIDEMIA WITH APOLIPOPROTEIN E4 VARIANT: ICD-10-CM

## 2018-10-29 DIAGNOSIS — Z72.0 TOBACCO USE: ICD-10-CM

## 2018-10-29 PROCEDURE — 99214 OFFICE O/P EST MOD 30 MIN: CPT | Performed by: FAMILY MEDICINE

## 2018-10-29 NOTE — ASSESSMENT & PLAN NOTE
This is a chronic health problem that is well controlled with current medications and lifestyle measures.  His blood sugar went up slightly at 121 but his A1c has remained the same at 6.0 which would indicate to me that he has overall maintained his blood sugars.  He is actually lost 3 pounds.  We will continue to monitor every 4-6 months.

## 2018-10-29 NOTE — ASSESSMENT & PLAN NOTE
This continues to be a problem for this patient but he is cutting down.  He is now down to less than a pack per day.  He knows he needs to quit but has tried multiple times and been unsuccessful.

## 2018-10-29 NOTE — PROGRESS NOTES
CC:Diagnoses of Atherosclerosis of aorta (HCC), Essential hypertension, Mixed hyperlipidemia with apolipoprotein E4 variant, Prediabetes, and Tobacco use were pertinent to this visit.    HISTORY OF PRESENT ILLNESS: Patient is a 68 y.o. male established patient who presents today to follow-up on chronic health problems as outlined below.    Patient also tells me since I last saw him he was seen in the emergency room at [place] because he ate a salad from a snack bar that had cashews in it and he did not realize that.  He ended up having an anaphylactic reaction.  He did well.  His EKG is normal but we will go ahead and screen that and so that we have a recent EKG on him.    Then in August patient was traveling in [place] and he fell onto his right hand.  He did not fracture his hand but he did take off a layer of skin.  That seems to have healed completely now without signs of infection.  Appears to be doing quite well    Health Maintenance: Completed    Atherosclerosis of aorta  This is a chronic health problem for this patient for which she is on secondary prevention.  He continues to smoke but is on a statin and an aspirin daily.  We are keeping his cholesterol under control.    HTN (hypertension)  This is a chronic health problem that is well controlled with current medications and lifestyle measures. His BP is good 126/64. The patient denies chest pain, shortness of breath or dyspnea on exertion.      Mixed hyperlipidemia with apolipoprotein E4 variant  This is a chronic health problem for this patient controlled with rosuvastatin and Zetia.  Total cholesterol is down to 111, triglycerides excellent at 71, HDL good at 55 and LDL is down to 42.    Prediabetes  This is a chronic health problem that is well controlled with current medications and lifestyle measures.  His blood sugar went up slightly at 121 but his A1c has remained the same at 6.0 which would indicate to me that he has overall maintained his  blood sugars.  He is actually lost 3 pounds.  We will continue to monitor every 4-6 months.    Tobacco use  This continues to be a problem for this patient but he is cutting down.  He is now down to less than a pack per day.  He knows he needs to quit but has tried multiple times and been unsuccessful.      Patient Active Problem List    Diagnosis Date Noted   • NAFL (nonalcoholic fatty liver) 03/30/2018   • Cervical radiculopathy 02/16/2018   • Encounter for screening for lung cancer 05/09/2017   • Coronary atherosclerosis due to calcified coronary lesion 12/14/2016   • Mixed hyperlipidemia with apolipoprotein E4 variant 09/17/2014   • Heterozygous MTHFR mutation C677T (HCC) 09/17/2014   • Prediabetes 09/17/2014   • Metabolic syndrome 09/17/2014   • Atherosclerosis of both carotid arteries 08/11/2014   • Abnormal chest CT 08/11/2014   • Screening for osteoporosis 08/07/2014   • Primary osteoarthritis of right knee 10/22/2013   • Atherosclerosis of aorta (HCC) 10/22/2013   • HTN (hypertension) 10/22/2013   • Tobacco use 10/22/2013   • History of kidney stones       Allergies:Penicillamine; Cephalexin; Cephalosporins; and Peanut-derived    Current Outpatient Prescriptions   Medication Sig Dispense Refill   • ezetimibe (ZETIA) 10 MG Tab Take 1 Tab by mouth every day. 100 Tab 4   • rosuvastatin (CRESTOR) 10 MG Tab Take 1 Tab by mouth every evening. 100 Tab 4   • beclomethasone (QVAR) 40 MCG/ACT inhaler Inhale 1 Puff by mouth 2 Times a Day. 3 Inhaler 3   • amLODIPine (NORVASC) 10 MG Tab Take 1 Tab by mouth every day. 90 Tab 2   • losartan (COZAAR) 100 MG Tab Take 1 Tab by mouth every day. 90 Tab 3   • albuterol 108 (90 Base) MCG/ACT Aero Soln inhalation aerosol Inhale 2 Puffs by mouth every 6 hours as needed for Shortness of Breath. 8.5 g 12   • Cholecalciferol (VITAMIN D) 2000 UNITS Cap Take 1 Cap by mouth every day.     • Cyanocobalamin (B-12) 1000 MCG TBCR Take 1 Each by mouth every day.     • aspirin EC (ECOTRIN) 81  MG TBEC Take 1 Tab by mouth every day.       No current facility-administered medications for this visit.        Social History   Substance Use Topics   • Smoking status: Current Every Day Smoker     Packs/day: 1.00     Years: 50.00     Types: Cigarettes   • Smokeless tobacco: Never Used      Comment: 05/09/17 -- currently 1/2 pack per day   • Alcohol use 0.0 oz/week      Comment: 1x/month, 1 drink per time     Social History     Social History Narrative   • No narrative on file       Family History   Problem Relation Age of Onset   • Asthma Mother    • Heart Attack Mother    • Hypertension Father    • Stroke Father    • Dementia Father    • Lung Disease Father         heavy smoker   • Other Brother         Morbid Obesity   • Alcohol/Drug Brother         prescription drug problem   • Alcohol/Drug Brother         street drugs   • Cancer Paternal Uncle         Pancreatic   • Colon Cancer Cousin         ROS:     - Constitutional:  Negative for fever, chills, unexpected weight change, and fatigue/generalized weakness.    - HEENT:  Negative for headaches, vision changes, hearing changes, ear pain, ear discharge, rhinorrhea, sinus congestion, sore throat, and neck pain.      - Respiratory: Negative for cough, sputum production, chest congestion, dyspnea, wheezing, and crackles.      - Cardiovascular: Negative for chest pain, palpitations, orthopnea, and bilateral lower extremity edema.     - Gastrointestinal: Negative for heartburn, nausea, vomiting, abdominal pain, hematochezia, melena, diarrhea, constipation, and greasy/foul-smelling stools.     - Genitourinary: Negative for dysuria, polyuria, hematuria, pyuria, urinary urgency, and urinary incontinence.     - Musculoskeletal: Negative for myalgias, back pain, and joint pain.     - Skin: Negative for rash, itching, cyanotic skin color change.     - Neurological: Negative for dizziness, tingling, tremors, focal sensory deficit, focal weakness and headaches.     -  "Endo/Heme/Allergies: Does not bruise/bleed easily.     - Psychiatric/Behavioral: Negative for depression, suicidal/homicidal ideation and memory loss.          - NOTE: All other systems reviewed and are negative, except as in HPI.      Exam:    Blood pressure 126/64, pulse 71, temperature 36.8 °C (98.2 °F), temperature source Temporal, resp. rate 12, height 1.753 m (5' 9.02\"), weight 80.1 kg (176 lb 9.4 oz), SpO2 96 %. Body mass index is 26.06 kg/m².    General:  Well nourished, well developed male in NAD  Head is grossly normal.  Neck: Supple without JVD or bruit. Thyroid is not enlarged.  Pulmonary: Clear to ausculation and percussion.  Normal effort. No rales, ronchi, or wheezing.  Cardiovascular: Regular rate and rhythm without murmur. Carotid and radial pulses are intact and equal bilaterally.  Extremities: no clubbing, cyanosis, or edema.  LABS: 10/24/18: Results reviewed and discussed with the patient, questions answered.    Please note that this dictation was created using voice recognition software. I have made every reasonable attempt to correct obvious errors, but I expect that there are errors of grammar and possibly content that I did not discover before finalizing the note.    Assessment/Plan:  1. Atherosclerosis of aorta (HCC)  Stable and on secondary prevention but patient does continue to smoke.    2. Essential hypertension  Controlled, continue with current meds and lifestyle.      3. Mixed hyperlipidemia with apolipoprotein E4 variant  Controlled, continue with current meds and lifestyle.    - COMP METABOLIC PANEL; Future  - LIPID PROFILE; Future    4. Prediabetes  Controlled, continue with current meds and lifestyle.    - HEMOGLOBIN A1C; Future    5. Tobacco use  Unfortunately patient continues to smoke which is affecting his long-term survivability and he is well aware of that.         "

## 2018-10-29 NOTE — ASSESSMENT & PLAN NOTE
This is a chronic health problem for this patient for which she is on secondary prevention.  He continues to smoke but is on a statin and an aspirin daily.  We are keeping his cholesterol under control.

## 2018-10-29 NOTE — ASSESSMENT & PLAN NOTE
This is a chronic health problem for this patient controlled with rosuvastatin and Zetia.  Total cholesterol is down to 111, triglycerides excellent at 71, HDL good at 55 and LDL is down to 42.

## 2018-10-29 NOTE — ASSESSMENT & PLAN NOTE
This is a chronic health problem that is well controlled with current medications and lifestyle measures. His BP is good 126/64. The patient denies chest pain, shortness of breath or dyspnea on exertion.

## 2019-02-28 ENCOUNTER — OFFICE VISIT (OUTPATIENT)
Dept: MEDICAL GROUP | Facility: MEDICAL CENTER | Age: 69
End: 2019-02-28
Payer: MEDICARE

## 2019-02-28 VITALS
SYSTOLIC BLOOD PRESSURE: 120 MMHG | HEIGHT: 69 IN | WEIGHT: 176.59 LBS | RESPIRATION RATE: 14 BRPM | OXYGEN SATURATION: 97 % | TEMPERATURE: 97.4 F | HEART RATE: 64 BPM | BODY MASS INDEX: 26.16 KG/M2 | DIASTOLIC BLOOD PRESSURE: 62 MMHG

## 2019-02-28 DIAGNOSIS — I77.6 INFLAMMATION OF ARTERIES (HCC): ICD-10-CM

## 2019-02-28 DIAGNOSIS — E78.2 MIXED HYPERLIPIDEMIA WITH APOLIPOPROTEIN E4 VARIANT: ICD-10-CM

## 2019-02-28 DIAGNOSIS — Z72.0 TOBACCO USE: ICD-10-CM

## 2019-02-28 DIAGNOSIS — R73.03 PREDIABETES: ICD-10-CM

## 2019-02-28 DIAGNOSIS — R06.00 DYSPNEA AND RESPIRATORY ABNORMALITIES: ICD-10-CM

## 2019-02-28 DIAGNOSIS — R06.89 DYSPNEA AND RESPIRATORY ABNORMALITIES: ICD-10-CM

## 2019-02-28 DIAGNOSIS — I70.0 ATHEROSCLEROSIS OF AORTA (HCC): ICD-10-CM

## 2019-02-28 DIAGNOSIS — L57.0 MULTIPLE ACTINIC KERATOSES: ICD-10-CM

## 2019-02-28 DIAGNOSIS — I10 ESSENTIAL HYPERTENSION: ICD-10-CM

## 2019-02-28 PROCEDURE — 17000 DESTRUCT PREMALG LESION: CPT | Performed by: FAMILY MEDICINE

## 2019-02-28 PROCEDURE — 17003 DESTRUCT PREMALG LES 2-14: CPT | Performed by: FAMILY MEDICINE

## 2019-02-28 PROCEDURE — 99214 OFFICE O/P EST MOD 30 MIN: CPT | Mod: 25 | Performed by: FAMILY MEDICINE

## 2019-02-28 RX ORDER — ROSUVASTATIN CALCIUM 10 MG/1
10 TABLET, COATED ORAL EVERY EVENING
Qty: 100 TAB | Refills: 4 | Status: SHIPPED | OUTPATIENT
Start: 2019-02-28 | End: 2020-05-12

## 2019-02-28 RX ORDER — ALBUTEROL SULFATE 90 UG/1
2 AEROSOL, METERED RESPIRATORY (INHALATION) EVERY 6 HOURS PRN
Qty: 8.5 G | Refills: 12 | Status: SHIPPED | OUTPATIENT
Start: 2019-02-28

## 2019-02-28 RX ORDER — EZETIMIBE 10 MG/1
10 TABLET ORAL DAILY
Qty: 100 TAB | Refills: 4 | Status: SHIPPED | OUTPATIENT
Start: 2019-02-28 | End: 2020-05-12

## 2019-02-28 RX ORDER — LOSARTAN POTASSIUM 100 MG/1
100 TABLET ORAL DAILY
Qty: 90 TAB | Refills: 3 | Status: SHIPPED | OUTPATIENT
Start: 2019-02-28 | End: 2020-04-24

## 2019-02-28 RX ORDER — AMLODIPINE BESYLATE 10 MG/1
10 TABLET ORAL DAILY
Qty: 90 TAB | Refills: 2 | Status: SHIPPED | OUTPATIENT
Start: 2019-02-28 | End: 2019-12-12

## 2019-02-28 ASSESSMENT — PATIENT HEALTH QUESTIONNAIRE - PHQ9: CLINICAL INTERPRETATION OF PHQ2 SCORE: 0

## 2019-02-28 NOTE — ASSESSMENT & PLAN NOTE
This is a chronic health problem for this patient for which she is on rosuvastatin 10 mg daily.  We will get baseline blood work again.  We will notify him of those results via my chart.

## 2019-02-28 NOTE — ASSESSMENT & PLAN NOTE
This is a chronic health problem that is well controlled with current medications and lifestyle measures.  Since blood pressure today 120/62, The patient denies chest pain, shortness of breath or dyspnea on exertion.

## 2019-02-28 NOTE — ASSESSMENT & PLAN NOTE
This is a new problem found today that the patient pointed out.  He has multiple skin lesions on his forehead and left helix of the ear that he would like to have cryo cauterized today.  They are all 3 actinic keratoses.  He has a history of excessive sun exposure.  None of them appeared to be ulcerated or cancerous at this time.

## 2019-02-28 NOTE — PROGRESS NOTES
CC:Diagnoses of Atherosclerosis of aorta (HCC), Multiple actinic keratoses, Essential hypertension, Prediabetes, Mixed hyperlipidemia with apolipoprotein E4 variant, Tobacco use, Inflammation of arteries (HCC), and Dyspnea and respiratory abnormalities were pertinent to this visit.    HISTORY OF PRESENT ILLNESS: Patient is a 68 y.o. male established patient who presents today to follow-up on his chronic health conditions but unfortunately he did not get blood work done.  He did not realize that he needed to do blood work prior to the visit.  He does have some other concerns including some skin lesions he would like to have examined and treated if they need to be.  We also spent some time talking about his tobacco use.  He continues to still work more than full-time at age 68 which increases his stress and causes him to smoke more during the week.  There is some light at the end of the tunnel that his job may be changing.  I am hoping that we can get him down to 1/2 pack a day all the time rather than only on weekends.    Health Maintenance: Completed    Atherosclerosis of aorta  This is a chronic health problem that is well controlled with current medications and lifestyle measures. Pt is on a statin and daily ASA so     Multiple actinic keratoses  This is a new problem found today that the patient pointed out.  He has multiple skin lesions on his forehead and left helix of the ear that he would like to have cryo cauterized today.  They are all 3 actinic keratoses.  He has a history of excessive sun exposure.  None of them appeared to be ulcerated or cancerous at this time.    HTN (hypertension)  This is a chronic health problem that is well controlled with current medications and lifestyle measures.  Since blood pressure today 120/62, The patient denies chest pain, shortness of breath or dyspnea on exertion.      Prediabetes  This is a chronic problem and needs to do some blood work to look at current  labs.    Mixed hyperlipidemia with apolipoprotein E4 variant  This is a chronic health problem for this patient for which she is on rosuvastatin 10 mg daily.  We will get baseline blood work again.  We will notify him of those results via my chart.    Tobacco use  This is a chronic health problem that is uncontrolled with current medications and lifestyle measures.  Patient continues to try and cut down but is not going to be able to quit.  He is less than a pack during the week and less than 1/2 pack on weekends.  He may be able to slow down soon depending on some things that may be changing at work which would be very helpful to his health.      Patient Active Problem List    Diagnosis Date Noted   • Multiple actinic keratoses 02/28/2019   • NAFL (nonalcoholic fatty liver) 03/30/2018   • Cervical radiculopathy 02/16/2018   • Encounter for screening for lung cancer 05/09/2017   • Coronary atherosclerosis due to calcified coronary lesion 12/14/2016   • Mixed hyperlipidemia with apolipoprotein E4 variant 09/17/2014   • Heterozygous MTHFR mutation C677T (HCC) 09/17/2014   • Prediabetes 09/17/2014   • Metabolic syndrome 09/17/2014   • Atherosclerosis of both carotid arteries 08/11/2014   • Abnormal chest CT 08/11/2014   • Screening for osteoporosis 08/07/2014   • Primary osteoarthritis of right knee 10/22/2013   • Atherosclerosis of aorta (HCC) 10/22/2013   • HTN (hypertension) 10/22/2013   • Tobacco use 10/22/2013   • History of kidney stones       Allergies:Penicillamine; Cephalexin; Cephalosporins; and Peanut-derived    Current Outpatient Prescriptions   Medication Sig Dispense Refill   • ezetimibe (ZETIA) 10 MG Tab Take 1 Tab by mouth every day. 100 Tab 4   • rosuvastatin (CRESTOR) 10 MG Tab Take 1 Tab by mouth every evening. 100 Tab 4   • beclomethasone (QVAR) 40 MCG/ACT inhaler Inhale 1 Puff by mouth 2 Times a Day. 3 Inhaler 3   • amLODIPine (NORVASC) 10 MG Tab Take 1 Tab by mouth every day. 90 Tab 2   • losartan  (COZAAR) 100 MG Tab Take 1 Tab by mouth every day. 90 Tab 3   • albuterol 108 (90 Base) MCG/ACT Aero Soln inhalation aerosol Inhale 2 Puffs by mouth every 6 hours as needed for Shortness of Breath. 8.5 g 12   • Cholecalciferol (VITAMIN D) 2000 UNITS Cap Take 1 Cap by mouth every day.     • Cyanocobalamin (B-12) 1000 MCG TBCR Take 1 Each by mouth every day.     • aspirin EC (ECOTRIN) 81 MG TBEC Take 1 Tab by mouth every day.       No current facility-administered medications for this visit.        Social History   Substance Use Topics   • Smoking status: Current Every Day Smoker     Packs/day: 1.00     Years: 50.00     Types: Cigarettes   • Smokeless tobacco: Never Used      Comment: 05/09/17 -- currently 1/2 pack per day   • Alcohol use 0.0 oz/week      Comment: 1x/month, 1 drink per time     Social History     Social History Narrative   • No narrative on file       Family History   Problem Relation Age of Onset   • Asthma Mother    • Heart Attack Mother    • Hypertension Father    • Stroke Father    • Dementia Father    • Lung Disease Father         heavy smoker   • Other Brother         Morbid Obesity   • Alcohol/Drug Brother         prescription drug problem   • Alcohol/Drug Brother         street drugs   • Cancer Paternal Uncle         Pancreatic   • Colon Cancer Cousin         ROS:     - Constitutional:  Negative for fever, chills, unexpected weight change, and fatigue/generalized weakness.    - HEENT:  Negative for headaches, vision changes, hearing changes, ear pain, ear discharge, rhinorrhea, sinus congestion, sore throat, and neck pain.      - Respiratory: Negative for cough, sputum production, chest congestion, dyspnea, wheezing, and crackles.      - Cardiovascular: Negative for chest pain, palpitations, orthopnea, and bilateral lower extremity edema.     - Gastrointestinal: Negative for heartburn, nausea, vomiting, abdominal pain, hematochezia, melena, diarrhea, constipation, and greasy/foul-smelling  "stools.     - Genitourinary: Negative for dysuria, polyuria, hematuria, pyuria, urinary urgency, and urinary incontinence.     - Musculoskeletal: Negative for myalgias, back pain, and joint pain.     - Skin: As in HPI      - Neurological: Negative for dizziness, tingling, tremors, focal sensory deficit, focal weakness and headaches.     - Endo/Heme/Allergies: Does not bruise/bleed easily.     - Psychiatric/Behavioral: Negative for depression, suicidal/homicidal ideation and memory loss.          - NOTE: All other systems reviewed and are negative, except as in HPI.      Exam:    Blood pressure 120/62, pulse 64, temperature 36.3 °C (97.4 °F), temperature source Temporal, resp. rate 14, height 1.753 m (5' 9.02\"), weight 80.1 kg (176 lb 9.4 oz), SpO2 97 %. Body mass index is 26.06 kg/m².    General:  Well nourished, well developed male in NAD  Head is grossly normal.  Skin: Patient has 3 actinic keratoses one on the helix of the left ear, one at the left temple and one at the right temple all without ulceration or excoriation.  No signs of infection.  Patient was seen for 25 minutes face to face of which, 20 minutes was spent counseling regarding discussion of medications of which he needs refills because he is going to get many of them from [facility].  We also discussed the need for him to continue to try to quit smoking.  Plan will be to discuss his results of his labs via my chart.  He will get them done today..    Please note that this dictation was created using voice recognition software. I have made every reasonable attempt to correct obvious errors, but I expect that there are errors of grammar and possibly content that I did not discover before finalizing the note.    Assessment/Plan:  1. Atherosclerosis of aorta (HCC)  This is a chronic health problem that stable.  Patient is on secondary prevention.    2. Multiple actinic keratoses  Uncontrolled, we did perform cryocautery on these today as a separate procedure " and note is below.    3. Essential hypertension  Controlled, continue with current meds and lifestyle.    - amLODIPine (NORVASC) 10 MG Tab; Take 1 Tab by mouth every day.  Dispense: 90 Tab; Refill: 2  - losartan (COZAAR) 100 MG Tab; Take 1 Tab by mouth every day.  Dispense: 90 Tab; Refill: 3    4. Prediabetes  Patient will do blood work but his prediabetes has been stable  - HEMOGLOBIN A1C; Future    5. Mixed hyperlipidemia with apolipoprotein E4 variant  This has been well controlled on rosuvastatin and Zetia.  We will get blood work and notify him of results.  - Comp Metabolic Panel; Future  - Lipid Profile; Future  - ezetimibe (ZETIA) 10 MG Tab; Take 1 Tab by mouth every day.  Dispense: 100 Tab; Refill: 4  - rosuvastatin (CRESTOR) 10 MG Tab; Take 1 Tab by mouth every evening.  Dispense: 100 Tab; Refill: 4    6. Tobacco use  Patient continues to smoke and knows he needs to stop.    7. Inflammation of arteries (HCC)  Patient continues on rosuvastatin because of the inflammation in the arteries.  - rosuvastatin (CRESTOR) 10 MG Tab; Take 1 Tab by mouth every evening.  Dispense: 100 Tab; Refill: 4    8. Dyspnea and respiratory abnormalities  This is a chronic health problem which is well controlled with current meds.  - beclomethasone (QVAR) 40 MCG/ACT inhaler; Inhale 1 Puff by mouth 2 Times a Day.  Dispense: 3 Inhaler; Refill: 3  - albuterol 108 (90 Base) MCG/ACT Aero Soln inhalation aerosol; Inhale 2 Puffs by mouth every 6 hours as needed for Shortness of Breath.  Dispense: 8.5 g; Refill: 12    SUBJECTIVE:   Deacon Pulido is a 68 y.o. male who presents for lesion removal, lesions were just identified today at his office visit.. We have already discussed this procedure, including option of not performing surgery, technique of surgery and potential for scarring at a recent visit.    OBJECTIVE:   Patient appears well. Vitals are normal.  Skin: Multiple actinic keratoses one on each side of his temple and involving  the helix of the left ear.  No other lesions of concern, patient has multiple seborrheic keratoses scattered over his body.    ASSESSMENT:   normal complete skin exam, no suspicious lesions, actinic keratoses, seborrheic keratoses    PLAN:    cryotherapy with liquid nitrogen was performed. Antibiotic dressing is applied, and wound care instructions provided.  Be alert for any signs of cutaneous infection. The procedure was well tolerated without complications. Follow up: the patient may return prn.

## 2019-02-28 NOTE — ASSESSMENT & PLAN NOTE
This is a chronic health problem that is well controlled with current medications and lifestyle measures. Pt is on a statin and daily ASA so

## 2019-02-28 NOTE — ASSESSMENT & PLAN NOTE
This is a chronic health problem that is uncontrolled with current medications and lifestyle measures.  Patient continues to try and cut down but is not going to be able to quit.  He is less than a pack during the week and less than 1/2 pack on weekends.  He may be able to slow down soon depending on some things that may be changing at work which would be very helpful to his health.

## 2019-03-27 ENCOUNTER — HOSPITAL ENCOUNTER (OUTPATIENT)
Dept: LAB | Facility: MEDICAL CENTER | Age: 69
End: 2019-03-27
Attending: FAMILY MEDICINE
Payer: MEDICARE

## 2019-03-27 DIAGNOSIS — E78.2 MIXED HYPERLIPIDEMIA WITH APOLIPOPROTEIN E4 VARIANT: ICD-10-CM

## 2019-03-27 DIAGNOSIS — R73.03 PREDIABETES: ICD-10-CM

## 2019-03-27 LAB
ALBUMIN SERPL BCP-MCNC: 3.9 G/DL (ref 3.2–4.9)
ALBUMIN/GLOB SERPL: 1.6 G/DL
ALP SERPL-CCNC: 61 U/L (ref 30–99)
ALT SERPL-CCNC: 13 U/L (ref 2–50)
ANION GAP SERPL CALC-SCNC: 6 MMOL/L (ref 0–11.9)
AST SERPL-CCNC: 16 U/L (ref 12–45)
BILIRUB SERPL-MCNC: 0.6 MG/DL (ref 0.1–1.5)
BUN SERPL-MCNC: 18 MG/DL (ref 8–22)
CALCIUM SERPL-MCNC: 9 MG/DL (ref 8.5–10.5)
CHLORIDE SERPL-SCNC: 109 MMOL/L (ref 96–112)
CHOLEST SERPL-MCNC: 108 MG/DL (ref 100–199)
CO2 SERPL-SCNC: 25 MMOL/L (ref 20–33)
CREAT SERPL-MCNC: 1.4 MG/DL (ref 0.5–1.4)
FASTING STATUS PATIENT QL REPORTED: NORMAL
GLOBULIN SER CALC-MCNC: 2.5 G/DL (ref 1.9–3.5)
GLUCOSE SERPL-MCNC: 122 MG/DL (ref 65–99)
HDLC SERPL-MCNC: 53 MG/DL
LDLC SERPL CALC-MCNC: 41 MG/DL
POTASSIUM SERPL-SCNC: 4.7 MMOL/L (ref 3.6–5.5)
PROT SERPL-MCNC: 6.4 G/DL (ref 6–8.2)
SODIUM SERPL-SCNC: 140 MMOL/L (ref 135–145)
TRIGL SERPL-MCNC: 69 MG/DL (ref 0–149)

## 2019-03-27 PROCEDURE — 80061 LIPID PANEL: CPT

## 2019-03-27 PROCEDURE — 83036 HEMOGLOBIN GLYCOSYLATED A1C: CPT | Mod: GA

## 2019-03-27 PROCEDURE — 80053 COMPREHEN METABOLIC PANEL: CPT

## 2019-03-27 PROCEDURE — 36415 COLL VENOUS BLD VENIPUNCTURE: CPT

## 2019-03-28 LAB
EST. AVERAGE GLUCOSE BLD GHB EST-MCNC: 131 MG/DL
HBA1C MFR BLD: 6.2 % (ref 0–5.6)

## 2019-04-03 DIAGNOSIS — E78.2 MIXED HYPERLIPIDEMIA WITH APOLIPOPROTEIN E4 VARIANT: ICD-10-CM

## 2019-04-03 DIAGNOSIS — R73.03 PREDIABETES: ICD-10-CM

## 2019-09-10 ENCOUNTER — PATIENT MESSAGE (OUTPATIENT)
Dept: MEDICAL GROUP | Facility: MEDICAL CENTER | Age: 69
End: 2019-09-10

## 2019-09-11 NOTE — TELEPHONE ENCOUNTER
From: Deacon Pulido  To: Rosa Leong M.D.  Sent: 9/10/2019 9:57 PM PDT  Subject: Procedure Question    Hi Dr Leong    Long time no see.  I understand that you are no longer accepting patients but I was hoping that you might accept my wife Shreyas.  She has been with Dr Ball in [facility] for years but needs someone locally.  Thank you for your consideration     Best, Deacon

## 2020-04-24 DIAGNOSIS — E78.2 MIXED HYPERLIPIDEMIA WITH APOLIPOPROTEIN E4 VARIANT: ICD-10-CM

## 2020-04-24 DIAGNOSIS — R73.03 PREDIABETES: ICD-10-CM

## 2020-04-24 DIAGNOSIS — I10 ESSENTIAL HYPERTENSION: ICD-10-CM

## 2020-04-24 DIAGNOSIS — I70.0 ATHEROSCLEROSIS OF AORTA (HCC): ICD-10-CM

## 2020-04-24 RX ORDER — LOSARTAN POTASSIUM 100 MG/1
TABLET ORAL
Qty: 90 TAB | Refills: 0 | Status: SHIPPED | OUTPATIENT
Start: 2020-04-24 | End: 2020-07-28

## 2020-04-24 NOTE — TELEPHONE ENCOUNTER
Please contact this patient and let him know that he needs to do blood work and come be seen.  Find out if he plans to follow us to [place]?  Let him know that I refilled his meds for only 90 days and we will not provide further refills without lab work and an office appointment. I ordered labs and you can mail to him.

## 2020-06-18 ENCOUNTER — HOSPITAL ENCOUNTER (OUTPATIENT)
Dept: LAB | Facility: MEDICAL CENTER | Age: 70
End: 2020-06-18
Attending: FAMILY MEDICINE
Payer: MEDICARE

## 2020-06-18 DIAGNOSIS — I70.0 ATHEROSCLEROSIS OF AORTA (HCC): ICD-10-CM

## 2020-06-18 DIAGNOSIS — R73.03 PREDIABETES: ICD-10-CM

## 2020-06-18 DIAGNOSIS — E78.2 MIXED HYPERLIPIDEMIA WITH APOLIPOPROTEIN E4 VARIANT: ICD-10-CM

## 2020-06-18 LAB
ALBUMIN SERPL BCP-MCNC: 4.4 G/DL (ref 3.2–4.9)
ALBUMIN/GLOB SERPL: 1.7 G/DL
ALP SERPL-CCNC: 71 U/L (ref 30–99)
ALT SERPL-CCNC: 12 U/L (ref 2–50)
ANION GAP SERPL CALC-SCNC: 8 MMOL/L (ref 7–16)
AST SERPL-CCNC: 17 U/L (ref 12–45)
BILIRUB SERPL-MCNC: 0.5 MG/DL (ref 0.1–1.5)
BUN SERPL-MCNC: 23 MG/DL (ref 8–22)
CALCIUM SERPL-MCNC: 9.8 MG/DL (ref 8.4–10.2)
CHLORIDE SERPL-SCNC: 106 MMOL/L (ref 96–112)
CHOLEST SERPL-MCNC: 107 MG/DL (ref 100–199)
CO2 SERPL-SCNC: 25 MMOL/L (ref 20–33)
CREAT SERPL-MCNC: 1.63 MG/DL (ref 0.5–1.4)
EST. AVERAGE GLUCOSE BLD GHB EST-MCNC: 120 MG/DL
FASTING STATUS PATIENT QL REPORTED: NORMAL
GLOBULIN SER CALC-MCNC: 2.6 G/DL (ref 1.9–3.5)
GLUCOSE SERPL-MCNC: 127 MG/DL (ref 65–99)
HBA1C MFR BLD: 5.8 % (ref 0–5.6)
HDLC SERPL-MCNC: 62 MG/DL
LDLC SERPL CALC-MCNC: 31 MG/DL
POTASSIUM SERPL-SCNC: 6.5 MMOL/L (ref 3.6–5.5)
PROT SERPL-MCNC: 7 G/DL (ref 6–8.2)
SODIUM SERPL-SCNC: 139 MMOL/L (ref 135–145)
TRIGL SERPL-MCNC: 68 MG/DL (ref 0–149)
TSH SERPL DL<=0.005 MIU/L-ACNC: 2.36 UIU/ML (ref 0.38–5.33)

## 2020-06-18 PROCEDURE — 80053 COMPREHEN METABOLIC PANEL: CPT

## 2020-06-18 PROCEDURE — 84443 ASSAY THYROID STIM HORMONE: CPT

## 2020-06-18 PROCEDURE — 36415 COLL VENOUS BLD VENIPUNCTURE: CPT

## 2020-06-18 PROCEDURE — 80061 LIPID PANEL: CPT

## 2020-06-18 PROCEDURE — 83036 HEMOGLOBIN GLYCOSYLATED A1C: CPT | Mod: GA

## 2020-06-24 ENCOUNTER — APPOINTMENT (OUTPATIENT)
Dept: MEDICAL GROUP | Facility: PHYSICIAN GROUP | Age: 70
End: 2020-06-24
Payer: MEDICARE

## 2020-06-30 ENCOUNTER — OFFICE VISIT (OUTPATIENT)
Dept: MEDICAL GROUP | Facility: PHYSICIAN GROUP | Age: 70
End: 2020-06-30
Payer: MEDICARE

## 2020-06-30 VITALS
WEIGHT: 174 LBS | HEART RATE: 69 BPM | BODY MASS INDEX: 25.77 KG/M2 | SYSTOLIC BLOOD PRESSURE: 122 MMHG | OXYGEN SATURATION: 96 % | HEIGHT: 69 IN | TEMPERATURE: 98 F | RESPIRATION RATE: 14 BRPM | DIASTOLIC BLOOD PRESSURE: 72 MMHG

## 2020-06-30 DIAGNOSIS — I10 ESSENTIAL HYPERTENSION: ICD-10-CM

## 2020-06-30 DIAGNOSIS — E11.9 TYPE 2 DIABETES MELLITUS WITHOUT COMPLICATION, WITHOUT LONG-TERM CURRENT USE OF INSULIN (HCC): ICD-10-CM

## 2020-06-30 DIAGNOSIS — Z11.59 ENCOUNTER FOR HEPATITIS C SCREENING TEST FOR LOW RISK PATIENT: ICD-10-CM

## 2020-06-30 DIAGNOSIS — N18.30 STAGE 3 CHRONIC KIDNEY DISEASE: ICD-10-CM

## 2020-06-30 DIAGNOSIS — K76.0 NAFL (NONALCOHOLIC FATTY LIVER): ICD-10-CM

## 2020-06-30 DIAGNOSIS — E78.2 MIXED HYPERLIPIDEMIA WITH APOLIPOPROTEIN E4 VARIANT: ICD-10-CM

## 2020-06-30 DIAGNOSIS — L82.1 SK (SEBORRHEIC KERATOSIS): ICD-10-CM

## 2020-06-30 PROCEDURE — 99214 OFFICE O/P EST MOD 30 MIN: CPT | Performed by: FAMILY MEDICINE

## 2020-06-30 ASSESSMENT — PATIENT HEALTH QUESTIONNAIRE - PHQ9: CLINICAL INTERPRETATION OF PHQ2 SCORE: 0

## 2020-06-30 NOTE — ASSESSMENT & PLAN NOTE
Pt has multiple SK on his back and scalp.  He is reassured as to the benign nature of these lesions.

## 2020-06-30 NOTE — ASSESSMENT & PLAN NOTE
This is a new diagnosis where the patient has progressed from prediabetes to diabetes.  Last time we saw him was in February 2019.  His glucose now fasting is 127 but his A1c is excellent at 5.8.  He does not need to be on medications he just needs to be careful about food choices and alcohol intake.

## 2020-06-30 NOTE — ASSESSMENT & PLAN NOTE
This is a chronic health problem that is well controlled with current medications and lifestyle measures. His bp is good at 122/72.  We will continue with those meds long-term.

## 2020-06-30 NOTE — ASSESSMENT & PLAN NOTE
This is a chronic health problem for this patient where his GFR is down to 42.  He tends to drink coffee mainly during the day and then diet green tea when he gets home.  He is going to try and move some of the green tea earlier in the day to see if we can get his GFR to stabilize and come up a little bit.

## 2020-06-30 NOTE — PROGRESS NOTES
CC:Diagnoses of Type 2 diabetes mellitus without complication, without long-term current use of insulin (HCC), NAFL (nonalcoholic fatty liver), Essential hypertension, Mixed hyperlipidemia with apolipoprotein E4 variant, Stage 3 chronic kidney disease (HCC), SK (seborrheic keratosis), and Encounter for hepatitis C screening test for low risk patient were pertinent to this visit.    HISTORY OF PRESENT ILLNESS: Patient is a 69 y.o. male established patient who presents today to about his chronic health problems and go over his labs that he did prior to the visit.  This patient had not been seen in over a year and was due for refills on meds.  He is here today to also have his 's license application signed that it is okay for him to continue to drive at age 69.      Type 2 diabetes mellitus without complication, without long-term current use of insulin (HCC)  This is a new diagnosis where the patient has progressed from prediabetes to diabetes.  Last time we saw him was in February 2019.  His glucose now fasting is 127 but his A1c is excellent at 5.8.  He does not need to be on medications he just needs to be careful about food choices and alcohol intake.    NAFL (nonalcoholic fatty liver)  This is been a chronic health problem for this patient in the past first found in 2018 on chest CT.  His SGOT is normal at 17 and his SGPT is normal at 12 with an alk phos of 71.  We will continue to follow.    HTN (hypertension)  This is a chronic health problem that is well controlled with current medications and lifestyle measures. His bp is good at 122/72.  We will continue with those meds long-term.    Mixed hyperlipidemia with apolipoprotein E4 variant  This is a chronic health problem for this patient for which he is on rosuvastatin 10 mg daily.  This has worked really well with getting his cholesterol under control.  Total cholesterol 107, triglycerides 68, HDL 62 and LDL is down to 31.  He will continue with these  meds long-term.    Stage 3 chronic kidney disease (HCC)  This is a chronic health problem for this patient where his GFR is down to 42.  He tends to drink coffee mainly during the day and then diet green tea when he gets home.  He is going to try and move some of the green tea earlier in the day to see if we can get his GFR to stabilize and come up a little bit.    SK (seborrheic keratosis)  Pt has multiple SK on his back and scalp.  He is reassured as to the benign nature of these lesions.      Patient Active Problem List    Diagnosis Date Noted   • Stage 3 chronic kidney disease (HCC) 06/30/2020   • SK (seborrheic keratosis) 06/30/2020   • Multiple actinic keratoses 02/28/2019   • NAFL (nonalcoholic fatty liver) 03/30/2018   • Cervical radiculopathy 02/16/2018   • Encounter for screening for lung cancer 05/09/2017   • Coronary atherosclerosis due to calcified coronary lesion 12/14/2016   • Mixed hyperlipidemia with apolipoprotein E4 variant 09/17/2014   • Type 2 diabetes mellitus without complication, without long-term current use of insulin (HCC) 09/17/2014   • Metabolic syndrome 09/17/2014   • Atherosclerosis of both carotid arteries 08/11/2014   • Abnormal chest CT 08/11/2014   • Screening for osteoporosis 08/07/2014   • Primary osteoarthritis of right knee 10/22/2013   • Atherosclerosis of aorta (HCC) 10/22/2013   • HTN (hypertension) 10/22/2013   • Tobacco use 10/22/2013   • History of kidney stones       Allergies:Penicillamine; Cephalexin; Cephalosporins; and Peanut-derived    Current Outpatient Medications   Medication Sig Dispense Refill   • ezetimibe (ZETIA) 10 MG Tab TAKE ONE TABLET BY MOUTH EVERY  Tab 0   • rosuvastatin (CRESTOR) 10 MG Tab TAKE ONE TABLET BY MOUTH EVERY EVENING 100 Tab 0   • losartan (COZAAR) 100 MG Tab TAKE ONE TABLET BY MOUTH EVERY DAY 90 Tab 0   • amLODIPine (NORVASC) 10 MG Tab Take 1 Tab by mouth every day. 90 Tab 2   • beclomethasone (QVAR) 40 MCG/ACT inhaler Inhale 1 Puff  by mouth 2 Times a Day. 3 Inhaler 3   • albuterol 108 (90 Base) MCG/ACT Aero Soln inhalation aerosol Inhale 2 Puffs by mouth every 6 hours as needed for Shortness of Breath. 8.5 g 12   • Cholecalciferol (VITAMIN D) 2000 UNITS Cap Take 1 Cap by mouth every day.     • Cyanocobalamin (B-12) 1000 MCG TBCR Take 1 Each by mouth every day.     • aspirin EC (ECOTRIN) 81 MG TBEC Take 1 Tab by mouth every day.       No current facility-administered medications for this visit.        Social History     Tobacco Use   • Smoking status: Current Every Day Smoker     Packs/day: 1.00     Years: 50.00     Pack years: 50.00     Types: Cigarettes   • Smokeless tobacco: Never Used   • Tobacco comment: 05/09/17 -- currently 1/2 pack per day   Substance Use Topics   • Alcohol use: Yes     Alcohol/week: 0.0 oz     Comment: 1x/month, 1 drink per time   • Drug use: No     Social History     Social History Narrative   • Not on file       Family History   Problem Relation Age of Onset   • Asthma Mother    • Heart Attack Mother    • Hypertension Father    • Stroke Father    • Dementia Father    • Lung Disease Father         heavy smoker   • Other Brother         Morbid Obesity   • Alcohol/Drug Brother         prescription drug problem   • Alcohol/Drug Brother         street drugs   • Cancer Paternal Uncle         Pancreatic   • Colon Cancer Cousin         ROS:     - Constitutional:  Negative for fever, chills, unexpected weight change, and fatigue/generalized weakness.    - HEENT:  Negative for headaches, vision changes, hearing changes, ear pain, ear discharge, rhinorrhea, sinus congestion, sore throat, and neck pain.      - Respiratory: Negative for cough, sputum production, chest congestion, dyspnea, wheezing, and crackles.      - Cardiovascular: Negative for chest pain, palpitations, orthopnea, and bilateral lower extremity edema.     - Gastrointestinal: Negative for heartburn, nausea, vomiting, abdominal pain, hematochezia, melena,  "diarrhea, constipation, and greasy/foul-smelling stools.     - Genitourinary: Negative for dysuria, polyuria, hematuria, pyuria, urinary urgency, and urinary incontinence.     - Musculoskeletal: Negative for myalgias, back pain, and joint pain.     - Skin: Negative for rash, itching, cyanotic skin color change.     - Neurological: Negative for dizziness, tingling, tremors, focal sensory deficit, focal weakness and headaches.     - Endo/Heme/Allergies: Does not bruise/bleed easily.     - Psychiatric/Behavioral: Negative for depression, suicidal/homicidal ideation and memory loss.          - NOTE: All other systems reviewed and are negative, except as in HPI.      Exam:    /72 (BP Location: Left arm, Patient Position: Sitting, BP Cuff Size: Adult)   Pulse 69   Temp 36.7 °C (98 °F) (Temporal)   Resp 14   Ht 1.753 m (5' 9\")   Wt 78.9 kg (174 lb)   SpO2 96%  Body mass index is 25.7 kg/m².    General:  Well nourished, well developed male in NAD  Head is grossly normal.  Neck: Supple without JVD or bruit. Thyroid is not enlarged.  Pulmonary: Clear to ausculation and percussion.  Normal effort. No rales, ronchi, or wheezing.  Cardiovascular: Regular rate and rhythm without murmur. Carotid and radial pulses are intact and equal bilaterally.  Extremities: no clubbing, cyanosis, or edema.  LABS: 6/18/2020: Results reviewed and discussed with the patient, questions answered.    Please note that this dictation was created using voice recognition software. I have made every reasonable attempt to correct obvious errors, but I expect that there are errors of grammar and possibly content that I did not discover before finalizing the note.    Assessment/Plan:  1. Type 2 diabetes mellitus without complication, without long-term current use of insulin (HCC)  Controlled, continue with current meds and lifestyle.    - HEMOGLOBIN A1C; Future    2. NAFL (nonalcoholic fatty liver)  Controlled, continue with current meds and " lifestyle.      3. Essential hypertension  Patient's blood pressure is controlled but his potassium was high at 6.5 on his blood draw.  I suspect this is a handling error.  Were going to repeat a BMP.  - Basic Metabolic Panel; Future  - Lipid Profile; Future    4. Mixed hyperlipidemia with apolipoprotein E4 variant  Controlled, continue with current meds and lifestyle.      5. Stage 3 chronic kidney disease (HCC)  Controlled, continue with current meds and lifestyle.      6. SK (seborrheic keratosis)  These are benign and patient was so informed    7. Encounter for hepatitis C screening test for low risk patient  Patient will do hepatitis C with next labs and we will plan to see him back in approximately 4 months  - HEP C VIRUS ANTIBODY; Future

## 2020-06-30 NOTE — ASSESSMENT & PLAN NOTE
This is a chronic health problem for this patient for which he is on rosuvastatin 10 mg daily.  This has worked really well with getting his cholesterol under control.  Total cholesterol 107, triglycerides 68, HDL 62 and LDL is down to 31.  He will continue with these meds long-term.

## 2020-06-30 NOTE — ASSESSMENT & PLAN NOTE
This is been a chronic health problem for this patient in the past first found in 2018 on chest CT.  His SGOT is normal at 17 and his SGPT is normal at 12 with an alk phos of 71.  We will continue to follow.

## 2020-07-06 ENCOUNTER — HOSPITAL ENCOUNTER (OUTPATIENT)
Dept: LAB | Facility: MEDICAL CENTER | Age: 70
End: 2020-07-06
Attending: FAMILY MEDICINE
Payer: MEDICARE

## 2020-07-06 DIAGNOSIS — I10 ESSENTIAL HYPERTENSION: ICD-10-CM

## 2020-07-06 LAB
ANION GAP SERPL CALC-SCNC: 12 MMOL/L (ref 7–16)
BUN SERPL-MCNC: 19 MG/DL (ref 8–22)
CALCIUM SERPL-MCNC: 9.7 MG/DL (ref 8.4–10.2)
CHLORIDE SERPL-SCNC: 101 MMOL/L (ref 96–112)
CO2 SERPL-SCNC: 23 MMOL/L (ref 20–33)
CREAT SERPL-MCNC: 1.28 MG/DL (ref 0.5–1.4)
FASTING STATUS PATIENT QL REPORTED: NORMAL
GLUCOSE SERPL-MCNC: 109 MG/DL (ref 65–99)
POTASSIUM SERPL-SCNC: 4.8 MMOL/L (ref 3.6–5.5)
SODIUM SERPL-SCNC: 136 MMOL/L (ref 135–145)

## 2020-07-06 PROCEDURE — 80048 BASIC METABOLIC PNL TOTAL CA: CPT

## 2020-07-06 PROCEDURE — 36415 COLL VENOUS BLD VENIPUNCTURE: CPT

## 2020-07-28 DIAGNOSIS — I10 ESSENTIAL HYPERTENSION: ICD-10-CM

## 2020-07-28 RX ORDER — LOSARTAN POTASSIUM 100 MG/1
TABLET ORAL
Qty: 90 EACH | Refills: 1 | Status: SHIPPED | OUTPATIENT
Start: 2020-07-28 | End: 2020-10-23 | Stop reason: SDUPTHER

## 2020-08-26 DIAGNOSIS — I10 ESSENTIAL HYPERTENSION: ICD-10-CM

## 2020-08-26 RX ORDER — AMLODIPINE BESYLATE 10 MG/1
TABLET ORAL
Qty: 90 TAB | Refills: 2 | Status: SHIPPED | OUTPATIENT
Start: 2020-08-26 | End: 2020-10-23 | Stop reason: SDUPTHER

## 2020-08-28 DIAGNOSIS — I77.6 INFLAMMATION OF ARTERIES (HCC): ICD-10-CM

## 2020-08-28 DIAGNOSIS — E78.2 MIXED HYPERLIPIDEMIA WITH APOLIPOPROTEIN E4 VARIANT: ICD-10-CM

## 2020-08-28 RX ORDER — ROSUVASTATIN CALCIUM 10 MG/1
TABLET, COATED ORAL
Qty: 100 TAB | Refills: 0 | Status: SHIPPED | OUTPATIENT
Start: 2020-08-28 | End: 2020-10-23 | Stop reason: SDUPTHER

## 2020-08-31 DIAGNOSIS — E78.2 MIXED HYPERLIPIDEMIA WITH APOLIPOPROTEIN E4 VARIANT: ICD-10-CM

## 2020-08-31 RX ORDER — EZETIMIBE 10 MG/1
10 TABLET ORAL
Qty: 100 TAB | Refills: 0 | Status: SHIPPED | OUTPATIENT
Start: 2020-08-31 | End: 2020-10-23 | Stop reason: SDUPTHER

## 2020-10-16 ENCOUNTER — HOSPITAL ENCOUNTER (OUTPATIENT)
Dept: LAB | Facility: MEDICAL CENTER | Age: 70
End: 2020-10-16
Attending: FAMILY MEDICINE
Payer: MEDICARE

## 2020-10-16 DIAGNOSIS — I10 ESSENTIAL HYPERTENSION: ICD-10-CM

## 2020-10-16 DIAGNOSIS — Z11.59 ENCOUNTER FOR HEPATITIS C SCREENING TEST FOR LOW RISK PATIENT: ICD-10-CM

## 2020-10-16 DIAGNOSIS — E11.9 TYPE 2 DIABETES MELLITUS WITHOUT COMPLICATION, WITHOUT LONG-TERM CURRENT USE OF INSULIN (HCC): ICD-10-CM

## 2020-10-16 LAB
CHOLEST SERPL-MCNC: 113 MG/DL (ref 100–199)
FASTING STATUS PATIENT QL REPORTED: NORMAL
HCV AB SER QL: NORMAL
HDLC SERPL-MCNC: 65 MG/DL
LDLC SERPL CALC-MCNC: 37 MG/DL
TRIGL SERPL-MCNC: 53 MG/DL (ref 0–149)

## 2020-10-16 PROCEDURE — 36415 COLL VENOUS BLD VENIPUNCTURE: CPT

## 2020-10-16 PROCEDURE — 86803 HEPATITIS C AB TEST: CPT

## 2020-10-16 PROCEDURE — 83036 HEMOGLOBIN GLYCOSYLATED A1C: CPT | Mod: GA

## 2020-10-16 PROCEDURE — 80061 LIPID PANEL: CPT

## 2020-10-17 LAB
EST. AVERAGE GLUCOSE BLD GHB EST-MCNC: 123 MG/DL
HBA1C MFR BLD: 5.9 % (ref 0–5.6)

## 2020-10-23 ENCOUNTER — OFFICE VISIT (OUTPATIENT)
Dept: MEDICAL GROUP | Facility: PHYSICIAN GROUP | Age: 70
End: 2020-10-23
Payer: MEDICARE

## 2020-10-23 VITALS
BODY MASS INDEX: 24.44 KG/M2 | OXYGEN SATURATION: 99 % | HEIGHT: 69 IN | SYSTOLIC BLOOD PRESSURE: 128 MMHG | WEIGHT: 165 LBS | DIASTOLIC BLOOD PRESSURE: 78 MMHG | HEART RATE: 62 BPM | TEMPERATURE: 97 F

## 2020-10-23 DIAGNOSIS — I10 ESSENTIAL HYPERTENSION: ICD-10-CM

## 2020-10-23 DIAGNOSIS — E11.9 TYPE 2 DIABETES MELLITUS WITHOUT COMPLICATION, WITHOUT LONG-TERM CURRENT USE OF INSULIN (HCC): ICD-10-CM

## 2020-10-23 DIAGNOSIS — E78.2 MIXED HYPERLIPIDEMIA WITH APOLIPOPROTEIN E4 VARIANT: ICD-10-CM

## 2020-10-23 DIAGNOSIS — I77.6 INFLAMMATION OF ARTERIES (HCC): ICD-10-CM

## 2020-10-23 PROCEDURE — 99214 OFFICE O/P EST MOD 30 MIN: CPT | Performed by: FAMILY MEDICINE

## 2020-10-23 RX ORDER — AMLODIPINE BESYLATE 10 MG/1
10 TABLET ORAL
Qty: 90 TAB | Refills: 3 | Status: SHIPPED | OUTPATIENT
Start: 2020-10-23 | End: 2021-10-28 | Stop reason: SDUPTHER

## 2020-10-23 RX ORDER — ROSUVASTATIN CALCIUM 10 MG/1
TABLET, COATED ORAL
Qty: 90 TAB | Refills: 3 | Status: SHIPPED | OUTPATIENT
Start: 2020-10-23 | End: 2021-10-28 | Stop reason: SDUPTHER

## 2020-10-23 RX ORDER — LOSARTAN POTASSIUM 100 MG/1
100 TABLET ORAL
Qty: 90 EACH | Refills: 3 | Status: SHIPPED | OUTPATIENT
Start: 2020-10-23 | End: 2021-10-28 | Stop reason: SDUPTHER

## 2020-10-23 RX ORDER — EZETIMIBE 10 MG/1
10 TABLET ORAL
Qty: 90 TAB | Refills: 3 | Status: SHIPPED | OUTPATIENT
Start: 2020-10-23 | End: 2021-10-28 | Stop reason: SDUPTHER

## 2020-10-23 NOTE — PROGRESS NOTES
CC:Diagnoses of Essential hypertension, Mixed hyperlipidemia with apolipoprotein E4 variant, Inflammation of arteries (HCC), and Type 2 diabetes mellitus without complication, without long-term current use of insulin (HCC) were pertinent to this visit.    HISTORY OF PRESENT ILLNESS: Patient is a 70 y.o. male established patient who presents today to talk about his chronic health problems and review his lab work that he did prior to the visit.      Type 2 diabetes mellitus without complication, without long-term current use of insulin (HCC)  This is a chronic health problem that is well controlled with current medications and lifestyle measures.  Patient doing a great job his A1c is down to 5.9.  He will continue to monitor and live by lifestyle.    Mixed hyperlipidemia with apolipoprotein E4 variant  This is a chronic health problem for this patient who is on generic Zetia 10 mg and rosuvastatin 10 mg.  His total cholesterol is down to 113, triglycerides are down to 53, HDL excellent at 65 LDL excellent at 37.  We will plan to recheck in 6 months.    HTN (hypertension)  This is a chronic health problem that is well controlled with current medications and lifestyle measures. BP is excellent at 120/64      Patient Active Problem List    Diagnosis Date Noted   • Stage 3 chronic kidney disease 06/30/2020   • SK (seborrheic keratosis) 06/30/2020   • Multiple actinic keratoses 02/28/2019   • NAFL (nonalcoholic fatty liver) 03/30/2018   • Cervical radiculopathy 02/16/2018   • Encounter for screening for lung cancer 05/09/2017   • Coronary atherosclerosis due to calcified coronary lesion 12/14/2016   • Mixed hyperlipidemia with apolipoprotein E4 variant 09/17/2014   • Type 2 diabetes mellitus without complication, without long-term current use of insulin (HCC) 09/17/2014   • Metabolic syndrome 09/17/2014   • Atherosclerosis of both carotid arteries 08/11/2014   • Abnormal chest CT 08/11/2014   • Screening for osteoporosis  08/07/2014   • Primary osteoarthritis of right knee 10/22/2013   • Atherosclerosis of aorta (HCC) 10/22/2013   • HTN (hypertension) 10/22/2013   • Tobacco use 10/22/2013   • History of kidney stones       Allergies:Penicillamine, Cephalexin, Cephalosporins, and Peanut-derived    Current Outpatient Medications   Medication Sig Dispense Refill   • losartan (COZAAR) 100 MG Tab Take 1 Tab by mouth every day. 90 Each 3   • amLODIPine (NORVASC) 10 MG Tab Take 1 Tab by mouth every day. 90 Tab 3   • rosuvastatin (CRESTOR) 10 MG Tab TAKE ONE TABLET BY MOUTH EVERY EVENING 90 Tab 3   • ezetimibe (ZETIA) 10 MG Tab Take 1 Tab by mouth every day. 90 Tab 3   • beclomethasone (QVAR) 40 MCG/ACT inhaler Inhale 1 Puff by mouth 2 Times a Day. 3 Inhaler 3   • albuterol 108 (90 Base) MCG/ACT Aero Soln inhalation aerosol Inhale 2 Puffs by mouth every 6 hours as needed for Shortness of Breath. 8.5 g 12   • Cholecalciferol (VITAMIN D) 2000 UNITS Cap Take 1 Cap by mouth every day.     • Cyanocobalamin (B-12) 1000 MCG TBCR Take 1 Each by mouth every day.     • aspirin EC (ECOTRIN) 81 MG TBEC Take 1 Tab by mouth every day.       No current facility-administered medications for this visit.        Social History     Tobacco Use   • Smoking status: Current Every Day Smoker     Packs/day: 1.00     Years: 50.00     Pack years: 50.00     Types: Cigarettes   • Smokeless tobacco: Never Used   • Tobacco comment: 05/09/17 -- currently 1/2 pack per day   Substance Use Topics   • Alcohol use: Yes     Alcohol/week: 0.0 oz     Comment: 1x/month, 1 drink per time   • Drug use: No     Social History     Social History Narrative   • Not on file       Family History   Problem Relation Age of Onset   • Asthma Mother    • Heart Attack Mother    • Hypertension Father    • Stroke Father    • Dementia Father    • Lung Disease Father         heavy smoker   • Other Brother         Morbid Obesity   • Alcohol/Drug Brother         prescription drug problem   •  "Alcohol/Drug Brother         street drugs   • Cancer Paternal Uncle         Pancreatic   • Colon Cancer Cousin         ROS:     - Constitutional:  Negative for fever, chills, unexpected weight change, and fatigue/generalized weakness.    - HEENT:  Negative for headaches, vision changes, hearing changes, ear pain, ear discharge, rhinorrhea, sinus congestion, sore throat, and neck pain.      - Respiratory: Negative for cough, sputum production, chest congestion, dyspnea, wheezing, and crackles.      - Cardiovascular: Negative for chest pain, palpitations, orthopnea, and bilateral lower extremity edema.     - Gastrointestinal: Negative for heartburn, nausea, vomiting, abdominal pain, hematochezia, melena, diarrhea, constipation, and greasy/foul-smelling stools.     - Genitourinary: Negative for dysuria, polyuria, hematuria, pyuria, urinary urgency, and urinary incontinence.     - Musculoskeletal: Negative for myalgias, back pain, and joint pain.     - Skin: Negative for rash, itching, cyanotic skin color change.     - Neurological: Negative for dizziness, tingling, tremors, focal sensory deficit, focal weakness and headaches.     - Endo/Heme/Allergies: Does not bruise/bleed easily.     - Psychiatric/Behavioral: Negative for depression, suicidal/homicidal ideation and memory loss.          - NOTE: All other systems reviewed and are negative, except as in HPI.      Exam:    /78   Pulse 62   Temp 36.1 °C (97 °F)   Ht 1.753 m (5' 9\")   Wt 74.8 kg (165 lb)   SpO2 99%  Body mass index is 24.37 kg/m².    General:  Well nourished, well developed male in NAD  Head is grossly normal.  Neck: Supple without JVD or bruit. Thyroid is not enlarged.  Pulmonary: Clear to ausculation and percussion.  Normal effort. No rales, ronchi, or wheezing.  Cardiovascular: Regular rate and rhythm without murmur. Carotid and radial pulses are intact and equal bilaterally.  Extremities: no clubbing, cyanosis, or edema.  LABS: 10/16/2020: " Results reviewed and discussed with the patient, questions answered.    Please note that this dictation was created using voice recognition software. I have made every reasonable attempt to correct obvious errors, but I expect that there are errors of grammar and possibly content that I did not discover before finalizing the note.    Assessment/Plan:  1. Essential hypertension  Controlled, continue with current meds and lifestyle.    - losartan (COZAAR) 100 MG Tab; Take 1 Tab by mouth every day.  Dispense: 90 Each; Refill: 3  - amLODIPine (NORVASC) 10 MG Tab; Take 1 Tab by mouth every day.  Dispense: 90 Tab; Refill: 3    2. Mixed hyperlipidemia with apolipoprotein E4 variant  Controlled, continue with current meds and lifestyle.    - rosuvastatin (CRESTOR) 10 MG Tab; TAKE ONE TABLET BY MOUTH EVERY EVENING  Dispense: 90 Tab; Refill: 3  - ezetimibe (ZETIA) 10 MG Tab; Take 1 Tab by mouth every day.  Dispense: 90 Tab; Refill: 3    3. Inflammation of arteries (HCC)  Controlled, continue with current meds and lifestyle.    - rosuvastatin (CRESTOR) 10 MG Tab; TAKE ONE TABLET BY MOUTH EVERY EVENING  Dispense: 90 Tab; Refill: 3    4. Type 2 diabetes mellitus without complication, without long-term current use of insulin (HCC)  Controlled, continue with current meds and lifestyle.

## 2020-10-23 NOTE — ASSESSMENT & PLAN NOTE
This is a chronic health problem that is well controlled with current medications and lifestyle measures. BP is excellent at 120/64

## 2020-10-23 NOTE — ASSESSMENT & PLAN NOTE
This is a chronic health problem for this patient who is on generic Zetia 10 mg and rosuvastatin 10 mg.  His total cholesterol is down to 113, triglycerides are down to 53, HDL excellent at 65 LDL excellent at 37.  We will plan to recheck in 6 months.

## 2020-10-23 NOTE — ASSESSMENT & PLAN NOTE
This is a chronic health problem that is well controlled with current medications and lifestyle measures.  Patient doing a great job his A1c is down to 5.9.  He will continue to monitor and live by lifestyle.

## 2021-01-15 DIAGNOSIS — Z23 NEED FOR VACCINATION: ICD-10-CM

## 2021-01-22 ENCOUNTER — IMMUNIZATION (OUTPATIENT)
Dept: FAMILY PLANNING/WOMEN'S HEALTH CLINIC | Facility: IMMUNIZATION CENTER | Age: 71
End: 2021-01-22
Attending: INTERNAL MEDICINE
Payer: MEDICARE

## 2021-01-22 DIAGNOSIS — Z23 ENCOUNTER FOR VACCINATION: Primary | ICD-10-CM

## 2021-01-22 DIAGNOSIS — Z23 NEED FOR VACCINATION: ICD-10-CM

## 2021-01-22 PROCEDURE — 91300 PFIZER SARS-COV-2 VACCINE: CPT

## 2021-01-22 PROCEDURE — 0001A PFIZER SARS-COV-2 VACCINE: CPT

## 2021-02-12 ENCOUNTER — IMMUNIZATION (OUTPATIENT)
Dept: FAMILY PLANNING/WOMEN'S HEALTH CLINIC | Facility: IMMUNIZATION CENTER | Age: 71
End: 2021-02-12
Attending: INTERNAL MEDICINE
Payer: MEDICARE

## 2021-02-12 DIAGNOSIS — Z23 ENCOUNTER FOR VACCINATION: Primary | ICD-10-CM

## 2021-02-12 PROCEDURE — 91300 PFIZER SARS-COV-2 VACCINE: CPT

## 2021-02-12 PROCEDURE — 0002A PFIZER SARS-COV-2 VACCINE: CPT

## 2021-10-28 ENCOUNTER — HOSPITAL ENCOUNTER (OUTPATIENT)
Dept: LAB | Facility: MEDICAL CENTER | Age: 71
End: 2021-10-28
Attending: FAMILY MEDICINE
Payer: MEDICARE

## 2021-10-28 ENCOUNTER — OFFICE VISIT (OUTPATIENT)
Dept: MEDICAL GROUP | Facility: LAB | Age: 71
End: 2021-10-28
Payer: MEDICARE

## 2021-10-28 VITALS
DIASTOLIC BLOOD PRESSURE: 62 MMHG | HEIGHT: 68 IN | HEART RATE: 61 BPM | SYSTOLIC BLOOD PRESSURE: 104 MMHG | WEIGHT: 167.11 LBS | BODY MASS INDEX: 25.33 KG/M2 | RESPIRATION RATE: 16 BRPM | OXYGEN SATURATION: 99 % | TEMPERATURE: 97.2 F

## 2021-10-28 DIAGNOSIS — R63.4 WEIGHT LOSS: ICD-10-CM

## 2021-10-28 DIAGNOSIS — E78.2 MIXED HYPERLIPIDEMIA WITH APOLIPOPROTEIN E4 VARIANT: ICD-10-CM

## 2021-10-28 DIAGNOSIS — F17.200 SMOKER: ICD-10-CM

## 2021-10-28 DIAGNOSIS — R73.03 PREDIABETES: ICD-10-CM

## 2021-10-28 DIAGNOSIS — Z76.89 ENCOUNTER TO ESTABLISH CARE WITH NEW DOCTOR: ICD-10-CM

## 2021-10-28 DIAGNOSIS — H61.23 EXCESSIVE CERUMEN IN BOTH EAR CANALS: ICD-10-CM

## 2021-10-28 DIAGNOSIS — I77.6 INFLAMMATION OF ARTERIES (HCC): ICD-10-CM

## 2021-10-28 DIAGNOSIS — I10 ESSENTIAL HYPERTENSION: ICD-10-CM

## 2021-10-28 DIAGNOSIS — Z12.2 SCREENING FOR LUNG CANCER: ICD-10-CM

## 2021-10-28 DIAGNOSIS — R53.83 OTHER FATIGUE: ICD-10-CM

## 2021-10-28 DIAGNOSIS — H93.13 TINNITUS OF BOTH EARS: ICD-10-CM

## 2021-10-28 DIAGNOSIS — I10 PRIMARY HYPERTENSION: ICD-10-CM

## 2021-10-28 DIAGNOSIS — Z12.2 ENCOUNTER FOR SCREENING FOR LUNG CANCER: ICD-10-CM

## 2021-10-28 DIAGNOSIS — Z71.1 PERSON WITH FEARED HEALTH COMPLAINT IN WHOM NO DIAGNOSIS IS MADE: ICD-10-CM

## 2021-10-28 LAB
ALBUMIN SERPL BCP-MCNC: 4.7 G/DL (ref 3.2–4.9)
ALBUMIN/GLOB SERPL: 2.2 G/DL
ALP SERPL-CCNC: 79 U/L (ref 30–99)
ALT SERPL-CCNC: 14 U/L (ref 2–50)
ANION GAP SERPL CALC-SCNC: 12 MMOL/L (ref 7–16)
AST SERPL-CCNC: 17 U/L (ref 12–45)
BILIRUB SERPL-MCNC: 0.5 MG/DL (ref 0.1–1.5)
BUN SERPL-MCNC: 19 MG/DL (ref 8–22)
CALCIUM SERPL-MCNC: 9.6 MG/DL (ref 8.5–10.5)
CHLORIDE SERPL-SCNC: 105 MMOL/L (ref 96–112)
CHOLEST SERPL-MCNC: 130 MG/DL (ref 100–199)
CO2 SERPL-SCNC: 24 MMOL/L (ref 20–33)
CREAT SERPL-MCNC: 1.74 MG/DL (ref 0.5–1.4)
FASTING STATUS PATIENT QL REPORTED: NORMAL
GLOBULIN SER CALC-MCNC: 2.1 G/DL (ref 1.9–3.5)
GLUCOSE SERPL-MCNC: 97 MG/DL (ref 65–99)
HDLC SERPL-MCNC: 69 MG/DL
LDLC SERPL CALC-MCNC: 39 MG/DL
POTASSIUM SERPL-SCNC: 5 MMOL/L (ref 3.6–5.5)
PROT SERPL-MCNC: 6.8 G/DL (ref 6–8.2)
SODIUM SERPL-SCNC: 141 MMOL/L (ref 135–145)
TRIGL SERPL-MCNC: 108 MG/DL (ref 0–149)

## 2021-10-28 PROCEDURE — 36415 COLL VENOUS BLD VENIPUNCTURE: CPT

## 2021-10-28 PROCEDURE — 80053 COMPREHEN METABOLIC PANEL: CPT

## 2021-10-28 PROCEDURE — 80061 LIPID PANEL: CPT

## 2021-10-28 PROCEDURE — 99214 OFFICE O/P EST MOD 30 MIN: CPT | Performed by: FAMILY MEDICINE

## 2021-10-28 RX ORDER — ROSUVASTATIN CALCIUM 10 MG/1
TABLET, COATED ORAL
Qty: 90 TABLET | Refills: 3 | Status: SHIPPED | OUTPATIENT
Start: 2021-10-28 | End: 2022-11-11 | Stop reason: SDUPTHER

## 2021-10-28 RX ORDER — AMLODIPINE BESYLATE 10 MG/1
10 TABLET ORAL
Qty: 90 TABLET | Refills: 3 | Status: SHIPPED | OUTPATIENT
Start: 2021-10-28 | End: 2022-11-07 | Stop reason: SDUPTHER

## 2021-10-28 RX ORDER — EZETIMIBE 10 MG/1
10 TABLET ORAL
Qty: 90 TABLET | Refills: 3 | Status: SHIPPED | OUTPATIENT
Start: 2021-10-28 | End: 2022-11-11 | Stop reason: SDUPTHER

## 2021-10-28 RX ORDER — LOSARTAN POTASSIUM 100 MG/1
100 TABLET ORAL
Qty: 90 EACH | Refills: 3 | Status: SHIPPED
Start: 2021-10-28 | End: 2021-11-02

## 2021-10-28 ASSESSMENT — PATIENT HEALTH QUESTIONNAIRE - PHQ9: CLINICAL INTERPRETATION OF PHQ2 SCORE: 0

## 2021-10-28 NOTE — PROGRESS NOTES
CC: Here to establish care, needs medication refills    HPI: Established patient, new to me  Deacon presents today to establish care, 71 years old male with past medical history significant for hypertension and hyperlipidemia, chronic smoker for 50 years.  Discussed the following today:    1. 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  And bookkeeping.    2. Prediabetes  History of prediabetes, no recent A1c check, reports unintentional weight loss recently    3. Weight loss  Reports unintentional weight loss for the past couple of months now, reports decreased appetite.  He said he has a lot of stress at work.  Denies depression.  Denies abdominal pain or changes in his chronic cough related to his smoking.    4. Smoker  Patient is a chronic smoker, not ready to quit at this time but he said he is thinking about it.  Counseled strongly on smoking cessation.  And offered help today    5. Other fatigue  Reports overall sometimes fatigue and tired more than usual, along with unintentional weight loss as described above    6. Primary hypertension  History of hypertension, continues to take Cozaar and amlodipine, blood pressure at the office 104/62.  Denies dizziness or lightheadedness or headache or cough other than his usual chronic cough related to smoking    7. Mixed hyperlipidemia with apolipoprotein E4 variant  History of hyperlipidemia patient on 2 medication statins and Zetia.  Denies side effects    8. Excessive cerumen in both ear canals  This is finding during exam, patient reports bilateral ear ringing for more than a year..  Advised also to follow-up for hearing test without audiology, patient to use Debrox for 10 days and will reevaluate next visit to see if he needs bilateral ear lavage  - Carbamide Peroxide    9. Essential hypertension  Chronic  well-controlled, will consider lowering blood pressure medications because of low blood pressure.  Refill today      Patient Active Problem List    Diagnosis Date Noted   • Encounter to establish care with new doctor 10/28/2021   • Prediabetes 10/28/2021   • Stage 3 chronic kidney disease (HCC) 06/30/2020   • SK (seborrheic keratosis) 06/30/2020   • Multiple actinic keratoses 02/28/2019   • NAFL (nonalcoholic fatty liver) 03/30/2018   • Cervical radiculopathy 02/16/2018   • Encounter for screening for lung cancer 05/09/2017   • Coronary atherosclerosis due to calcified coronary lesion 12/14/2016   • Mixed hyperlipidemia with apolipoprotein E4 variant 09/17/2014   • Metabolic syndrome 09/17/2014   • Atherosclerosis of both carotid arteries 08/11/2014   • Abnormal chest CT 08/11/2014   • Screening for osteoporosis 08/07/2014   • Primary osteoarthritis of right knee 10/22/2013   • Atherosclerosis of aorta (HCC) 10/22/2013   • HTN (hypertension) 10/22/2013   • Tobacco use 10/22/2013   • History of kidney stones        Current Outpatient Medications   Medication Sig Dispense Refill   • losartan (COZAAR) 100 MG Tab Take 1 Tab by mouth every day. 90 Each 3   • amLODIPine (NORVASC) 10 MG Tab Take 1 Tab by mouth every day. 90 Tab 3   • rosuvastatin (CRESTOR) 10 MG Tab TAKE ONE TABLET BY MOUTH EVERY EVENING 90 Tab 3   • ezetimibe (ZETIA) 10 MG Tab Take 1 Tab by mouth every day. 90 Tab 3   • beclomethasone (QVAR) 40 MCG/ACT inhaler Inhale 1 Puff by mouth 2 Times a Day. 3 Inhaler 3   • albuterol 108 (90 Base) MCG/ACT Aero Soln inhalation aerosol Inhale 2 Puffs by mouth every 6 hours as needed for Shortness of Breath. 8.5 g 12   • Cholecalciferol (VITAMIN D) 2000 UNITS Cap Take 1 Cap by mouth every day.     • Cyanocobalamin (B-12) 1000 MCG TBCR Take 1 Each by mouth every day.       Current Facility-Administered Medications   Medication Dose Route Frequency Provider Last Rate Last Admin   • carbamide peroxide (DEBROX) 6.5 % otic  solution 6 Drop  6 Drop Both Ears Once Marco Rizvi M.D.             Allergies as of 10/28/2021 - Reviewed 10/28/2021   Allergen Reaction Noted   • Penicillamine Unspecified 05/09/2017   • Cephalexin  08/26/2012   • Cephalosporins  08/26/2012   • Peanut-derived  08/26/2012        ROS: Denies any chest pain, Shortness of breath, Changes bowel or bladder, Lower extremity edema.    Physical Exam:  Gen.: Well-developed, well-nourished, no apparent distress,pleasant and cooperative with the examination  Skin:  Warm and dry with good turgor. No rashes or suspicious lesions in visible areas  Eye: PERRLA, conjunctiva and sclera clear, lids normal  HEENT: Normocephalic/atraumatic, sinuses nontender with palpation, evidence of bilateral accumulation of excessive cerumen in both ears.  Nares patent with pink mucosa and clear rhinorrhea, lips without lesions, oropharynx clear.  Neck: Trachea midline,no masses or adenopathy  Thyroid: normal consistency and size. No masses or nodules. Not tender with palpation.  Cor: Regular rate and rhythm without murmur, gallop or rub.  Lungs: Respirations unlabored.Clear to auscultation with equal breath sounds bilaterally. No wheezes, rhonchi.  Abdomen: Soft nontender without hepatosplenomegaly or masses appreciated, normoactive bowel sounds. No hernias.  Extremities: No cyanosis, clubbing or edema, Symmetrical without deformities or malformations. Pulses 2+ and symmetrical both upper and lower extremities  Lymphatic: No abnormal adenopathy of the neck groin or axillae.  Psych: Alert and oriented x 3.Normal affect, judgement,insight and memory.        Assessment and Plan.   71 y.o. male here to establish care    1. Encounter to establish care with new doctor  *Reviewed medical history as above    2. Prediabetes  Chronic recheck A1c  - HEMOGLOBIN A1C; Future    3. Weight loss  New concern, patient is high risk because of chronic smoking.  We will do labs, screen for lung cancer.  Fit test.   And follow-up for next visit  - CBC WITH DIFFERENTIAL; Future  - Comp Metabolic Panel; Future  - TSH WITH REFLEX TO FT4; Future  - Sed Rate; Future  - OCCULT BLOOD FECES IMMUNOASSAY; Future    4. Smoker  Counseled for smoking cessation, due for lung cancer screen  - CT-CHEST LOW DOSE W/O; Future    5. Other fatigue  Will do work up to R/O DM, Anemia , Thyroid disease , Kidney disease    - CBC WITH DIFFERENTIAL; Future  - Comp Metabolic Panel; Future  - TSH WITH REFLEX TO FT4; Future    6. Primary hypertension  Chronic well-controlled continue same regimen    7. Mixed hyperlipidemia with apolipoprotein E4 variant  Chronic, recheck lipid profile continue medications as directed  - Lipid Profile; Future    8. Excessive cerumen in both ear canals  Finding during exam:  Use eardrops as directed, possible ear lavage for next visit  - carbamide peroxide (DEBROX) 6.5 % otic solution 6 Drop    9.tinnitus: Chronic, new to me.  Advised to check hearing test with audiology, and possibly related to excessive wax accumulation in both ears advised to use drops as directed and come for possible ear lavage next visit   10. Lung ca screening :  - CT-CHEST LOW DOSE W/O; Future  Please note that this dictation was created using voice recognition software. I have made every reasonable attempt to correct obvious errors but there may be errors of grammar and content that I may have overlooked prior to finalization of this note.

## 2021-10-30 ENCOUNTER — HOSPITAL ENCOUNTER (OUTPATIENT)
Facility: MEDICAL CENTER | Age: 71
End: 2021-10-30
Attending: FAMILY MEDICINE
Payer: MEDICARE

## 2021-10-30 PROCEDURE — 82274 ASSAY TEST FOR BLOOD FECAL: CPT

## 2021-11-02 DIAGNOSIS — N18.32 STAGE 3B CHRONIC KIDNEY DISEASE: ICD-10-CM

## 2021-11-02 RX ORDER — LOSARTAN POTASSIUM 50 MG/1
50 TABLET ORAL DAILY
Qty: 90 TABLET | Refills: 3 | Status: SHIPPED | OUTPATIENT
Start: 2021-11-02 | End: 2022-11-11 | Stop reason: SDUPTHER

## 2021-11-04 DIAGNOSIS — R63.4 WEIGHT LOSS: ICD-10-CM

## 2021-11-06 LAB — IMM ASSAY OCC BLD FITOB: NEGATIVE

## 2021-11-10 ENCOUNTER — OFFICE VISIT (OUTPATIENT)
Dept: NEPHROLOGY | Facility: MEDICAL CENTER | Age: 71
End: 2021-11-10
Payer: MEDICARE

## 2021-11-10 VITALS
RESPIRATION RATE: 18 BRPM | TEMPERATURE: 97.9 F | WEIGHT: 169 LBS | SYSTOLIC BLOOD PRESSURE: 130 MMHG | OXYGEN SATURATION: 98 % | HEART RATE: 75 BPM | DIASTOLIC BLOOD PRESSURE: 60 MMHG | BODY MASS INDEX: 25.61 KG/M2 | HEIGHT: 68 IN

## 2021-11-10 DIAGNOSIS — E55.9 VITAMIN D DEFICIENCY: ICD-10-CM

## 2021-11-10 DIAGNOSIS — N20.0 NEPHROLITHIASIS: ICD-10-CM

## 2021-11-10 DIAGNOSIS — D64.9 ANEMIA, UNSPECIFIED TYPE: ICD-10-CM

## 2021-11-10 DIAGNOSIS — N18.32 STAGE 3B CHRONIC KIDNEY DISEASE: ICD-10-CM

## 2021-11-10 DIAGNOSIS — I10 PRIMARY HYPERTENSION: ICD-10-CM

## 2021-11-10 DIAGNOSIS — R80.9 MICROALBUMINURIA: ICD-10-CM

## 2021-11-10 PROCEDURE — 99204 OFFICE O/P NEW MOD 45 MIN: CPT | Performed by: INTERNAL MEDICINE

## 2021-11-10 RX ORDER — FOLIC ACID 1 MG/1
1 TABLET ORAL DAILY
COMMUNITY
End: 2023-09-05

## 2021-11-10 ASSESSMENT — ENCOUNTER SYMPTOMS
WEIGHT LOSS: 0
PALPITATIONS: 0
SHORTNESS OF BREATH: 0
NAUSEA: 0
NECK PAIN: 0
FLANK PAIN: 0
FEVER: 0
VOMITING: 0
EYES NEGATIVE: 1
HEMOPTYSIS: 0
MYALGIAS: 0
COUGH: 0
BACK PAIN: 1
CHILLS: 0
ORTHOPNEA: 0
WHEEZING: 0
ABDOMINAL PAIN: 0
SINUS PAIN: 0

## 2021-11-10 NOTE — PATIENT INSTRUCTIONS
Continue current treatment  Keep well hydrated  Monitor BP  Avoid NSAID's  Complete renal US  Complete next week renal panel and call clinic

## 2021-11-10 NOTE — PROGRESS NOTES
Subjective     Deacon Pulido is a 71 y.o. male who presents with New Patient and Chronic Kidney Disease            HPI  Deacon is coming today for initial evaluation of CKD III woth worsening creatinine level  Doing well, no complaints except chronic arthritic pain - on Advil  Also c/o of increase frequency/urgency to urinate, nocturia x 3  No dysuria/hematuria flank pain  Long term hx/of HTN  (+) nephrolithiasis  CKD III a -worse to IIIb -creat 1.26- not at 1.74  Had episodes of low BP -reduced losartan dose -now BP well controlled  Family hx/of HTN    Review of Systems   Constitutional: Negative for chills, fever, malaise/fatigue and weight loss.   HENT: Negative for congestion, hearing loss and sinus pain.    Eyes: Negative.    Respiratory: Negative for cough, hemoptysis, shortness of breath and wheezing.    Cardiovascular: Negative for chest pain, palpitations, orthopnea and leg swelling.   Gastrointestinal: Negative for abdominal pain, nausea and vomiting.   Genitourinary: Positive for frequency and urgency. Negative for dysuria, flank pain and hematuria.   Musculoskeletal: Positive for back pain and joint pain. Negative for myalgias and neck pain.   Skin: Negative.    All other systems reviewed and are negative.         Past Medical History:   Diagnosis Date   • Allergy    • Cervical radiculopathy 2/16/2018   • Family history of dementia    • Family history of hypertension    • Family history of stroke    • Hypertension    • Kidney stones    • Multiple actinic keratoses 2/28/2019   • NAFL (nonalcoholic fatty liver) 3/30/2018    Dx on CT Chest 2/24/18   • SK (seborrheic keratosis) 6/30/2020   • Stage 3 chronic kidney disease (HCC) 6/30/2020   • Tobacco use 10/22/2013   • Type 2 diabetes mellitus without complication, without long-term current use of insulin (HCC) 9/17/2014       Family History   Problem Relation Age of Onset   • Asthma Mother    • Heart Attack Mother    • Hypertension Father    • Stroke  Father    • Dementia Father    • Lung Disease Father         heavy smoker   • Other Brother         Morbid Obesity   • Alcohol/Drug Brother         prescription drug problem   • Alcohol/Drug Brother         street drugs   • Cancer Paternal Uncle         Pancreatic   • Colon Cancer Cousin    • Cancer Paternal Aunt         colon ca        Social History     Socioeconomic History   • Marital status:      Spouse name: Not on file   • Number of children: Not on file   • Years of education: Not on file   • Highest education level: Not on file   Occupational History   • Not on file   Tobacco Use   • Smoking status: Current Every Day Smoker     Packs/day: 1.00     Years: 50.00     Pack years: 50.00     Types: Cigarettes   • Smokeless tobacco: Never Used   • Tobacco comment: 05/09/17 -- currently 1/2 pack per day   Vaping Use   • Vaping Use: Not on file   Substance and Sexual Activity   • Alcohol use: Not Currently     Alcohol/week: 0.0 oz     Comment: 1x/month, 1 drink per time   • Drug use: No   • Sexual activity: Yes     Partners: Female     Birth control/protection: Post-Menopausal     Comment: ,  multiple companies   Other Topics Concern   • Not on file   Social History Narrative   • Not on file     Social Determinants of Health     Financial Resource Strain:    • Difficulty of Paying Living Expenses: Not on file   Food Insecurity:    • Worried About Running Out of Food in the Last Year: Not on file   • Ran Out of Food in the Last Year: Not on file   Transportation Needs:    • Lack of Transportation (Medical): Not on file   • Lack of Transportation (Non-Medical): Not on file   Physical Activity:    • Days of Exercise per Week: Not on file   • Minutes of Exercise per Session: Not on file   Stress:    • Feeling of Stress : Not on file   Social Connections:    • Frequency of Communication with Friends and Family: Not on file   • Frequency of Social Gatherings with Friends and Family: Not on file   • Attends  "[employer] Services: Not on file   • Active Member of Clubs or Organizations: Not on file   • Attends Club or Organization Meetings: Not on file   • Marital Status: Not on file   Intimate Partner Violence:    • Fear of Current or Ex-Partner: Not on file   • Emotionally Abused: Not on file   • Physically Abused: Not on file   • Sexually Abused: Not on file   Housing Stability:    • Unable to Pay for Housing in the Last Year: Not on file   • Number of Places Lived in the Last Year: Not on file   • Unstable Housing in the Last Year: Not on file         Objective     /60 (BP Location: Right arm, Patient Position: Sitting)   Pulse 75   Temp 36.6 °C (97.9 °F) (Temporal)   Resp 18   Ht 1.727 m (5' 8\")   Wt 76.7 kg (169 lb)   SpO2 98%   BMI 25.70 kg/m²      Physical Exam  Vitals reviewed.   Constitutional:       General: He is not in acute distress.     Appearance: Normal appearance. He is well-developed. He is not diaphoretic.   HENT:      Head: Normocephalic and atraumatic.      Nose: Nose normal.      Mouth/Throat:      Mouth: Mucous membranes are moist.      Pharynx: Oropharynx is clear.   Eyes:      General: No scleral icterus.     Extraocular Movements: Extraocular movements intact.      Conjunctiva/sclera: Conjunctivae normal.      Pupils: Pupils are equal, round, and reactive to light.   Cardiovascular:      Rate and Rhythm: Normal rate and regular rhythm.      Pulses: Normal pulses.      Heart sounds: Normal heart sounds. No friction rub. No gallop.    Pulmonary:      Effort: Pulmonary effort is normal. No respiratory distress.      Breath sounds: Normal breath sounds. No wheezing.   Abdominal:      General: Bowel sounds are normal.      Palpations: Abdomen is soft.      Tenderness: There is no right CVA tenderness or left CVA tenderness.   Musculoskeletal:      Cervical back: Normal range of motion and neck supple.      Right lower leg: No edema.      Left lower leg: No edema.   Skin:     General: Skin " is warm.      Coloration: Skin is not pale.      Findings: No erythema or rash.   Neurological:      General: No focal deficit present.      Mental Status: He is alert and oriented to person, place, and time.      Cranial Nerves: No cranial nerve deficit.      Coordination: Coordination normal.   Psychiatric:         Mood and Affect: Mood normal.         Behavior: Behavior normal.         Thought Content: Thought content normal.         Judgment: Judgment normal.                    Laboratory results reviewed: d/w Pt  Lab Results   Component Value Date/Time    CREATININE 1.74 (H) 10/28/2021 01:54 PM    POTASSIUM 5.0 10/28/2021 01:54 PM         Assessment & Plan        1. Stage 3b chronic kidney disease (HCC)       Worsening creat level -could be due to hypotension -to monitor closely      To repeat renal panel next week       Keep well hydrated    - Basic Metabolic Panel; Future  - URINALYSIS; Future  - US-RENAL; Future  - Basic Metabolic Panel; Future  - PTH INTACT (PTH ONLY); Future    2. Primary hypertension      Low BP under better control since reduced Losartan dose  - Basic Metabolic Panel; Future  - URINALYSIS; Future    3. Vitamin D deficiency      To monitor  - VITAMIN D 25-HYDROXY    4. Microalbuminuria      To monitor  - MICROALBUMIN CREAT RATIO URINE; Future    5. Nephrolithiasis      To complete renal US  - Basic Metabolic Panel; Future  - URINALYSIS; Future  - US-RENAL; Future  - PTH INTACT (PTH ONLY); Future    6. Anemia, unspecified type        - CBC WITHOUT DIFFERENTIAL; Future            Recs:  Continue current treatment  Keep well hydrated  Monitor BP  Avoid NSAID's  Complete renal US  Complete next week renal panel and call clinic  F/u in 3 months  Thank you for the consult

## 2021-11-11 ENCOUNTER — HOSPITAL ENCOUNTER (OUTPATIENT)
Dept: RADIOLOGY | Facility: MEDICAL CENTER | Age: 71
End: 2021-11-11
Attending: INTERNAL MEDICINE
Payer: MEDICARE

## 2021-11-11 DIAGNOSIS — N18.32 STAGE 3B CHRONIC KIDNEY DISEASE: ICD-10-CM

## 2021-11-11 DIAGNOSIS — N20.0 NEPHROLITHIASIS: ICD-10-CM

## 2021-11-11 PROCEDURE — 76775 US EXAM ABDO BACK WALL LIM: CPT

## 2021-11-16 ENCOUNTER — HOSPITAL ENCOUNTER (OUTPATIENT)
Dept: LAB | Facility: MEDICAL CENTER | Age: 71
End: 2021-11-16
Attending: INTERNAL MEDICINE
Payer: MEDICARE

## 2021-11-16 DIAGNOSIS — N18.32 STAGE 3B CHRONIC KIDNEY DISEASE: ICD-10-CM

## 2021-11-16 DIAGNOSIS — N20.0 NEPHROLITHIASIS: ICD-10-CM

## 2021-11-16 DIAGNOSIS — I10 PRIMARY HYPERTENSION: ICD-10-CM

## 2021-11-16 LAB
ANION GAP SERPL CALC-SCNC: 11 MMOL/L (ref 7–16)
APPEARANCE UR: CLEAR
BILIRUB UR QL STRIP.AUTO: NEGATIVE
BUN SERPL-MCNC: 16 MG/DL (ref 8–22)
CALCIUM SERPL-MCNC: 9 MG/DL (ref 8.5–10.5)
CHLORIDE SERPL-SCNC: 107 MMOL/L (ref 96–112)
CO2 SERPL-SCNC: 22 MMOL/L (ref 20–33)
COLOR UR: YELLOW
CREAT SERPL-MCNC: 1.27 MG/DL (ref 0.5–1.4)
GLUCOSE SERPL-MCNC: 112 MG/DL (ref 65–99)
GLUCOSE UR STRIP.AUTO-MCNC: NEGATIVE MG/DL
KETONES UR STRIP.AUTO-MCNC: NEGATIVE MG/DL
LEUKOCYTE ESTERASE UR QL STRIP.AUTO: NEGATIVE
MICRO URNS: NORMAL
NITRITE UR QL STRIP.AUTO: NEGATIVE
PH UR STRIP.AUTO: 6 [PH] (ref 5–8)
POTASSIUM SERPL-SCNC: 4.4 MMOL/L (ref 3.6–5.5)
PROT UR QL STRIP: NEGATIVE MG/DL
RBC UR QL AUTO: NEGATIVE
SODIUM SERPL-SCNC: 140 MMOL/L (ref 135–145)
SP GR UR STRIP.AUTO: 1.01
UROBILINOGEN UR STRIP.AUTO-MCNC: 1 MG/DL

## 2021-11-16 PROCEDURE — 81003 URINALYSIS AUTO W/O SCOPE: CPT

## 2021-11-16 PROCEDURE — 36415 COLL VENOUS BLD VENIPUNCTURE: CPT

## 2021-11-16 PROCEDURE — 80048 BASIC METABOLIC PNL TOTAL CA: CPT

## 2021-11-19 ENCOUNTER — OFFICE VISIT (OUTPATIENT)
Dept: MEDICAL GROUP | Facility: LAB | Age: 71
End: 2021-11-19
Payer: MEDICARE

## 2021-11-19 VITALS
HEIGHT: 68 IN | DIASTOLIC BLOOD PRESSURE: 66 MMHG | TEMPERATURE: 97.1 F | HEART RATE: 68 BPM | SYSTOLIC BLOOD PRESSURE: 130 MMHG | BODY MASS INDEX: 25.16 KG/M2 | OXYGEN SATURATION: 98 % | WEIGHT: 166 LBS | RESPIRATION RATE: 12 BRPM

## 2021-11-19 DIAGNOSIS — R63.4 WEIGHT LOSS: ICD-10-CM

## 2021-11-19 DIAGNOSIS — N28.89 RIGHT KIDNEY MASS: ICD-10-CM

## 2021-11-19 DIAGNOSIS — N18.31 STAGE 3A CHRONIC KIDNEY DISEASE: ICD-10-CM

## 2021-11-19 DIAGNOSIS — R73.03 PREDIABETES: ICD-10-CM

## 2021-11-19 DIAGNOSIS — R05.9 COUGH: ICD-10-CM

## 2021-11-19 PROCEDURE — 99214 OFFICE O/P EST MOD 30 MIN: CPT | Performed by: FAMILY MEDICINE

## 2021-11-19 RX ORDER — FEXOFENADINE HCL 180 MG/1
180 TABLET ORAL DAILY
Qty: 30 TABLET | Refills: 1 | Status: SHIPPED
Start: 2021-11-19 | End: 2021-12-28

## 2021-11-19 NOTE — PROGRESS NOTES
Chief Complaint:   Chief Complaint   Patient presents with   • Follow-Up     3 WEEK        HPI: Established patient  Deacon Pulido is a 71 y.o. male who presents for follow-up, discussed with the patient the following concerns as follow    1. Right kidney mass  Reviewed most recent renal ultrasound, which shows evidence of right kidney solid mass.  That requires further evaluation by CAT scan.  Patient is asymptomatic except for low GFR, reviewed his most recent labs that was ordered by nephrology.  Improvement of kidney function noted.  Reviewed notes from his nephrologist.    2. Weight loss  Patient reports that weight loss as per our last visit discussion, today he told me that he thinks it is more like intentional because he is trying to cut back on his food and meals to stay healthy.  He has lost around 3 pounds since last visit.    3. Cough  Reports this chronic persistent cough that he relates to his smoking.  But he said his wife is more concerned because the cough is persistent now.  He denies fever, reports occasional shortness of breath.  Denies chest pain.  Reports production of phlegm that is clear.  Patient has long history of smoking.  And does not seem to be ready to quit smoking at this time, counseled strongly    4. Stage 3a chronic kidney disease   Reviewed kidney function test with the patient, improvement of kidney function test noted by normalized creatinine and improvement in his GFR.  He has already established with nephrology.  And will continue follow-up with nephrology as directed, asymptomatic for abdominal pain, only symptoms he has mentioned above.    5. Prediabetes  History of diabetes and elevated A1c, will do another check for A1c and his upcoming blood work.          Past medical history, family history, social history and medications reviewed and updated in the record.  Today  Current medications, problem list and allergies reviewed in [facility] today  Health maintenance topics are  reviewed and updated.    Patient Active Problem List    Diagnosis Date Noted   • Right kidney mass 11/19/2021   • Weight loss 11/19/2021   • Stage 3b chronic kidney disease (HCC) 11/02/2021   • Encounter to establish care with new doctor 10/28/2021   • Prediabetes 10/28/2021   • Stage 3 chronic kidney disease (HCC) 06/30/2020   • SK (seborrheic keratosis) 06/30/2020   • Multiple actinic keratoses 02/28/2019   • NAFL (nonalcoholic fatty liver) 03/30/2018   • Cervical radiculopathy 02/16/2018   • Encounter for screening for lung cancer 05/09/2017   • Coronary atherosclerosis due to calcified coronary lesion 12/14/2016   • Mixed hyperlipidemia with apolipoprotein E4 variant 09/17/2014   • Metabolic syndrome 09/17/2014   • Atherosclerosis of both carotid arteries 08/11/2014   • Abnormal chest CT 08/11/2014   • Screening for osteoporosis 08/07/2014   • Primary osteoarthritis of right knee 10/22/2013   • Atherosclerosis of aorta (HCC) 10/22/2013   • HTN (hypertension) 10/22/2013   • Tobacco use 10/22/2013   • History of kidney stones      Family History   Problem Relation Age of Onset   • Asthma Mother    • Heart Attack Mother    • Hypertension Father    • Stroke Father    • Dementia Father    • Lung Disease Father         heavy smoker   • Other Brother         Morbid Obesity   • Alcohol/Drug Brother         prescription drug problem   • Alcohol/Drug Brother         street drugs   • Cancer Paternal Uncle         Pancreatic   • Colon Cancer Cousin    • Cancer Paternal Aunt         colon ca      Social History     Socioeconomic History   • Marital status:      Spouse name: Not on file   • Number of children: Not on file   • Years of education: Not on file   • Highest education level: Not on file   Occupational History   • Not on file   Tobacco Use   • Smoking status: Current Every Day Smoker     Packs/day: 1.00     Years: 50.00     Pack years: 50.00     Types: Cigarettes   • Smokeless tobacco: Never Used   • Tobacco  comment: 05/09/17 -- currently 1/2 pack per day   Vaping Use   • Vaping Use: Not on file   Substance and Sexual Activity   • Alcohol use: Not Currently     Alcohol/week: 0.0 oz     Comment: 1x/month, 1 drink per time   • Drug use: No   • Sexual activity: Yes     Partners: Female     Birth control/protection: Post-Menopausal     Comment: ,  multiple companies   Other Topics Concern   • Not on file   Social History Narrative   • Not on file     Social Determinants of Health     Financial Resource Strain:    • Difficulty of Paying Living Expenses: Not on file   Food Insecurity:    • Worried About Running Out of Food in the Last Year: Not on file   • Ran Out of Food in the Last Year: Not on file   Transportation Needs:    • Lack of Transportation (Medical): Not on file   • Lack of Transportation (Non-Medical): Not on file   Physical Activity:    • Days of Exercise per Week: Not on file   • Minutes of Exercise per Session: Not on file   Stress:    • Feeling of Stress : Not on file   Social Connections:    • Frequency of Communication with Friends and Family: Not on file   • Frequency of Social Gatherings with Friends and Family: Not on file   • Attends Latter-day Services: Not on file   • Active Member of Clubs or Organizations: Not on file   • Attends Club or Organization Meetings: Not on file   • Marital Status: Not on file   Intimate Partner Violence:    • Fear of Current or Ex-Partner: Not on file   • Emotionally Abused: Not on file   • Physically Abused: Not on file   • Sexually Abused: Not on file   Housing Stability:    • Unable to Pay for Housing in the Last Year: Not on file   • Number of Places Lived in the Last Year: Not on file   • Unstable Housing in the Last Year: Not on file     Current Outpatient Medications   Medication Sig Dispense Refill   • folic acid (FOLVITE) 1 MG Tab Take 1 mg by mouth every day.     • losartan (COZAAR) 50 MG Tab Take 1 Tablet by mouth every day. 90 Tablet 3   • amLODIPine  "(NORVASC) 10 MG Tab Take 1 Tablet by mouth every day. 90 Tablet 3   • rosuvastatin (CRESTOR) 10 MG Tab TAKE ONE TABLET BY MOUTH EVERY EVENING 90 Tablet 3   • ezetimibe (ZETIA) 10 MG Tab Take 1 Tablet by mouth every day. 90 Tablet 3   • beclomethasone (QVAR) 40 MCG/ACT inhaler Inhale 1 Puff by mouth 2 Times a Day. 3 Inhaler 3   • albuterol 108 (90 Base) MCG/ACT Aero Soln inhalation aerosol Inhale 2 Puffs by mouth every 6 hours as needed for Shortness of Breath. 8.5 g 12   • Cholecalciferol (VITAMIN D) 2000 UNITS Cap Take 1 Cap by mouth every day.     • Cyanocobalamin (B-12) 1000 MCG TBCR Take 1 Each by mouth every day.       No current facility-administered medications for this visit.           Review Of Systems  As documented in HPI above  PHYSICAL EXAMINATION:    /66 (BP Location: Left arm, Patient Position: Sitting, BP Cuff Size: Adult)   Pulse 68   Temp 36.2 °C (97.1 °F)   Resp 12   Ht 1.727 m (5' 8\")   Wt 75.3 kg (166 lb)   SpO2 98%   BMI 25.24 kg/m²   Gen.: Well-developed, well-nourished, no apparent distress, pleasant and cooperative with the examination  HEENT: Normocephalic/atraumatic,     Neck: No JVD or bruits, no adenopathy  Cor: Regular rate and rhythm without murmur gallop or rub  Lungs: Clear to auscultation with equal breath sounds bilaterally. No wheezes, rhonchi.  Abdomen: Soft nontender without hepatosplenomegaly or masses appreciated, normoactive bowel sounds  Extremities: No cyanosis, clubbing or edema          ASSESSMENT/Plan:  1. Right kidney mass   incidental finding on his renal ultrasound, will do a CAT scan for further evaluation of the mass.  CT-ABDOMEN-PELVIS W/O   2. Weight loss   chronic problem, patient reports that he thinks it is intentional because he is cutting back on his meals and improving his dietary habits.  But I will rule out other serious causes like cancer because the patient has this persistent cough and he is a long term smoker.  Counseled strongly on " smoking cessation.  Advised to do a CAT scan for further evaluation and blood work.  CT-CHEST (THORAX) W/O    Sed Rate    TSH    FREE THYROXINE   3. Cough   chronic persistent cough and resolving.  Discussed with the patient possibility is a smoker cough but we need to rule out other causes like cancer.  Counseled strongly on smoking cessation advised to use Mucinex as directed, hydration and supportive care for now until I review his CAT scan result.  CT-CHEST (THORAX) W/O   4. Stage 3a chronic kidney disease (HCC)   chronic, improved.  Continue hydration, avoid NSAIDs.  Monitor blood pressure and follow-up with nephrology.       Please note that this dictation was created using voice recognition software. I have made every reasonable attempt to correct obvious errors but there may be errors of grammar and content that I may have overlooked prior to finalization of this note.

## 2021-11-27 ENCOUNTER — HOSPITAL ENCOUNTER (OUTPATIENT)
Dept: LAB | Facility: MEDICAL CENTER | Age: 71
End: 2021-11-27
Attending: FAMILY MEDICINE
Payer: MEDICARE

## 2021-11-27 DIAGNOSIS — R63.4 WEIGHT LOSS: ICD-10-CM

## 2021-11-27 DIAGNOSIS — R73.03 PREDIABETES: ICD-10-CM

## 2021-11-27 LAB
ERYTHROCYTE [SEDIMENTATION RATE] IN BLOOD BY WESTERGREN METHOD: 8 MM/HOUR (ref 0–20)
T4 FREE SERPL-MCNC: 1.27 NG/DL (ref 0.93–1.7)
TSH SERPL DL<=0.005 MIU/L-ACNC: 1.79 UIU/ML (ref 0.38–5.33)

## 2021-11-27 PROCEDURE — 36415 COLL VENOUS BLD VENIPUNCTURE: CPT

## 2021-11-27 PROCEDURE — 84439 ASSAY OF FREE THYROXINE: CPT

## 2021-11-27 PROCEDURE — 85652 RBC SED RATE AUTOMATED: CPT

## 2021-11-27 PROCEDURE — 84443 ASSAY THYROID STIM HORMONE: CPT

## 2021-11-30 ENCOUNTER — HOSPITAL ENCOUNTER (OUTPATIENT)
Dept: RADIOLOGY | Facility: MEDICAL CENTER | Age: 71
End: 2021-11-30
Attending: FAMILY MEDICINE
Payer: MEDICARE

## 2021-11-30 DIAGNOSIS — N28.89 RIGHT KIDNEY MASS: ICD-10-CM

## 2021-11-30 DIAGNOSIS — R05.9 COUGH: ICD-10-CM

## 2021-11-30 DIAGNOSIS — R63.4 WEIGHT LOSS: ICD-10-CM

## 2021-11-30 PROBLEM — R91.8 LUNG NODULES: Status: ACTIVE | Noted: 2021-11-30

## 2021-11-30 PROCEDURE — 71250 CT THORAX DX C-: CPT | Mod: MG

## 2021-12-28 ENCOUNTER — OFFICE VISIT (OUTPATIENT)
Dept: MEDICAL GROUP | Facility: LAB | Age: 71
End: 2021-12-28
Payer: MEDICARE

## 2021-12-28 VITALS
OXYGEN SATURATION: 97 % | WEIGHT: 166 LBS | SYSTOLIC BLOOD PRESSURE: 126 MMHG | TEMPERATURE: 97.2 F | BODY MASS INDEX: 25.16 KG/M2 | HEIGHT: 68 IN | HEART RATE: 72 BPM | DIASTOLIC BLOOD PRESSURE: 70 MMHG | RESPIRATION RATE: 16 BRPM

## 2021-12-28 DIAGNOSIS — R63.4 WEIGHT LOSS: ICD-10-CM

## 2021-12-28 DIAGNOSIS — F17.200 SMOKER: ICD-10-CM

## 2021-12-28 DIAGNOSIS — N18.31 STAGE 3A CHRONIC KIDNEY DISEASE: ICD-10-CM

## 2021-12-28 DIAGNOSIS — I10 PRIMARY HYPERTENSION: ICD-10-CM

## 2021-12-28 DIAGNOSIS — R91.8 LUNG NODULES: ICD-10-CM

## 2021-12-28 PROCEDURE — 99214 OFFICE O/P EST MOD 30 MIN: CPT | Performed by: FAMILY MEDICINE

## 2021-12-28 NOTE — PROGRESS NOTES
Chief Complaint:   Chief Complaint   Patient presents with   • Lab Results     imaging results       HPI: Established patient  Deacon Pulido is a 71 y.o. male who presents for follow-up, discussed the following today:    1. Primary hypertension  Blood pressure at the office today 126/70, patient continues to take his medication as directed without reported side effects, denies headache or chest pain or shortness of breath or lightheadedness.    2. Weight loss  Full work-up for weight loss is negative, reviewed with the patient labs and CAT scan results today, patient told me today that he has been intentionally restricting his calories in his meals to avoid being on the obese side and it is unintentional weight loss.  Denies any concerns at this time, BMI is 25.2.  Reviewed CAT scan which shows unchanged pulmonary nodules.  And adrenal nodule.  Discussed with the patient and answered his questions today.  3. Stage 3a chronic kidney disease  Patient is already established with nephrology, nephrologist notes reviewed, as per notes considering to recheck kidney function test to see if it might be related to low blood pressure and low perfusion of the kidney.  Patient blood pressure at the office today is within normal limits.    4. Smoker  Strongly counseled for smoking cessation.  Patient said he is not ready at this time but he is thinking about it.  Advised when he is ready to let me know and I will can start him on medication.  Or nicotine replacement therapy.          Past medical history, family history, social history and medications reviewed and updated in the record.  Today  Current medications, problem list and allergies reviewed in [facility] today  Health maintenance topics are reviewed and updated.    Patient Active Problem List    Diagnosis Date Noted   • Lung nodules 11/30/2021   • Right kidney mass 11/19/2021   • Weight loss 11/19/2021   • Stage 3b chronic kidney disease (HCC) 11/02/2021   • Encounter to  establish care with new doctor 10/28/2021   • Prediabetes 10/28/2021   • Stage 3 chronic kidney disease (HCC) 06/30/2020   • SK (seborrheic keratosis) 06/30/2020   • Multiple actinic keratoses 02/28/2019   • NAFL (nonalcoholic fatty liver) 03/30/2018   • Cervical radiculopathy 02/16/2018   • Encounter for screening for lung cancer 05/09/2017   • Coronary atherosclerosis due to calcified coronary lesion 12/14/2016   • Mixed hyperlipidemia with apolipoprotein E4 variant 09/17/2014   • Metabolic syndrome 09/17/2014   • Atherosclerosis of both carotid arteries 08/11/2014   • Abnormal chest CT 08/11/2014   • Screening for osteoporosis 08/07/2014   • Primary osteoarthritis of right knee 10/22/2013   • Atherosclerosis of aorta (HCC) 10/22/2013   • HTN (hypertension) 10/22/2013   • Tobacco use 10/22/2013   • History of kidney stones      Family History   Problem Relation Age of Onset   • Asthma Mother    • Heart Attack Mother    • Hypertension Father    • Stroke Father    • Dementia Father    • Lung Disease Father         heavy smoker   • Other Brother         Morbid Obesity   • Alcohol/Drug Brother         prescription drug problem   • Alcohol/Drug Brother         street drugs   • Cancer Paternal Uncle         Pancreatic   • Colon Cancer Cousin    • Cancer Paternal Aunt         colon ca      Social History     Socioeconomic History   • Marital status:      Spouse name: Not on file   • Number of children: Not on file   • Years of education: Not on file   • Highest education level: Not on file   Occupational History   • Not on file   Tobacco Use   • Smoking status: Current Every Day Smoker     Packs/day: 1.00     Years: 50.00     Pack years: 50.00     Types: Cigarettes   • Smokeless tobacco: Never Used   • Tobacco comment: 05/09/17 -- currently 1/2 pack per day   Vaping Use   • Vaping Use: Not on file   Substance and Sexual Activity   • Alcohol use: Not Currently     Alcohol/week: 0.0 oz     Comment: 1x/month, 1 drink  per time   • Drug use: No   • Sexual activity: Yes     Partners: Female     Birth control/protection: Post-Menopausal     Comment: ,  multiple companies   Other Topics Concern   • Not on file   Social History Narrative   • Not on file     Social Determinants of Health     Financial Resource Strain:    • Difficulty of Paying Living Expenses: Not on file   Food Insecurity:    • Worried About Running Out of Food in the Last Year: Not on file   • Ran Out of Food in the Last Year: Not on file   Transportation Needs:    • Lack of Transportation (Medical): Not on file   • Lack of Transportation (Non-Medical): Not on file   Physical Activity:    • Days of Exercise per Week: Not on file   • Minutes of Exercise per Session: Not on file   Stress:    • Feeling of Stress : Not on file   Social Connections:    • Frequency of Communication with Friends and Family: Not on file   • Frequency of Social Gatherings with Friends and Family: Not on file   • Attends Jainism Services: Not on file   • Active Member of Clubs or Organizations: Not on file   • Attends Club or Organization Meetings: Not on file   • Marital Status: Not on file   Intimate Partner Violence:    • Fear of Current or Ex-Partner: Not on file   • Emotionally Abused: Not on file   • Physically Abused: Not on file   • Sexually Abused: Not on file   Housing Stability:    • Unable to Pay for Housing in the Last Year: Not on file   • Number of Places Lived in the Last Year: Not on file   • Unstable Housing in the Last Year: Not on file         Current Outpatient Medications   Medication Sig Dispense Refill   • folic acid (FOLVITE) 1 MG Tab Take 1 mg by mouth every day.     • losartan (COZAAR) 50 MG Tab Take 1 Tablet by mouth every day. 90 Tablet 3   • amLODIPine (NORVASC) 10 MG Tab Take 1 Tablet by mouth every day. 90 Tablet 3   • rosuvastatin (CRESTOR) 10 MG Tab TAKE ONE TABLET BY MOUTH EVERY EVENING 90 Tablet 3   • ezetimibe (ZETIA) 10 MG Tab Take 1 Tablet by  "mouth every day. 90 Tablet 3   • beclomethasone (QVAR) 40 MCG/ACT inhaler Inhale 1 Puff by mouth 2 Times a Day. 3 Inhaler 3   • albuterol 108 (90 Base) MCG/ACT Aero Soln inhalation aerosol Inhale 2 Puffs by mouth every 6 hours as needed for Shortness of Breath. 8.5 g 12   • Cholecalciferol (VITAMIN D) 2000 UNITS Cap Take 1 Cap by mouth every day.     • Cyanocobalamin (B-12) 1000 MCG TBCR Take 1 Each by mouth every day.       No current facility-administered medications for this visit.       Review Of Systems  As documented in HPI above  PHYSICAL EXAMINATION:    /70 (BP Location: Left arm, Patient Position: Sitting, BP Cuff Size: Adult)   Pulse 72   Temp 36.2 °C (97.2 °F) (Temporal)   Resp 16   Ht 1.727 m (5' 8\")   Wt 75.3 kg (166 lb)   SpO2 97%   BMI 25.24 kg/m²   Gen.: Well-developed, well-nourished, no apparent distress, pleasant and cooperative with the examination  HEENT: Normocephalic/atraumatic,  Neck: No JVD or bruits, no adenopathy  Cor: Regular rate and rhythm without murmur gallop or rub  Lungs: Clear to auscultation with equal breath sounds bilaterally. No wheezes, rhonchi.  Abdomen: Soft nontender without hepatosplenomegaly or masses appreciated, normoactive bowel sounds  Extremities: No cyanosis, clubbing or edema          ASSESSMENT/Plan:  1. Primary hypertension   chronic well-controlled continue same regimen   2. Weight loss   chronic, intentional because the patient is restricting his diet and calories.  We will keep monitoring, all work-up negative cancer and malignancy ruled out.   3. Stage 3a chronic kidney disease (HCC)   chronic, stable continue follow-up with nephrology, will keep monitoring blood pressure.  Avoid all NSAIDs and increase hydration   4. Smoker   strongly counseled for smoking cessation.  Patient is not ready at this time.   5. Lung nodules   stable nodules, as per CAT scan recommendation to repeat in 1 to 2 years.  Patient is completely asymptomatic except for his " chronic smoker's cough.       Please note that this dictation was created using voice recognition software. I have made every reasonable attempt to correct obvious errors but there may be errors of grammar and content that I may have overlooked prior to finalization of this note.

## 2022-02-16 ENCOUNTER — HOSPITAL ENCOUNTER (OUTPATIENT)
Dept: LAB | Facility: MEDICAL CENTER | Age: 72
End: 2022-02-16
Attending: INTERNAL MEDICINE
Payer: MEDICARE

## 2022-02-16 DIAGNOSIS — N20.0 NEPHROLITHIASIS: ICD-10-CM

## 2022-02-16 DIAGNOSIS — D64.9 ANEMIA, UNSPECIFIED TYPE: ICD-10-CM

## 2022-02-16 DIAGNOSIS — R80.9 MICROALBUMINURIA: ICD-10-CM

## 2022-02-16 DIAGNOSIS — N18.32 STAGE 3B CHRONIC KIDNEY DISEASE: ICD-10-CM

## 2022-02-16 LAB
ANION GAP SERPL CALC-SCNC: 14 MMOL/L (ref 7–16)
BUN SERPL-MCNC: 15 MG/DL (ref 8–22)
CALCIUM SERPL-MCNC: 9.6 MG/DL (ref 8.5–10.5)
CHLORIDE SERPL-SCNC: 105 MMOL/L (ref 96–112)
CO2 SERPL-SCNC: 23 MMOL/L (ref 20–33)
CREAT SERPL-MCNC: 1.33 MG/DL (ref 0.5–1.4)
ERYTHROCYTE [DISTWIDTH] IN BLOOD BY AUTOMATED COUNT: 46.8 FL (ref 35.9–50)
GLUCOSE SERPL-MCNC: 115 MG/DL (ref 65–99)
HCT VFR BLD AUTO: 47 % (ref 42–52)
HGB BLD-MCNC: 15.6 G/DL (ref 14–18)
MCH RBC QN AUTO: 32.4 PG (ref 27–33)
MCHC RBC AUTO-ENTMCNC: 33.2 G/DL (ref 33.7–35.3)
MCV RBC AUTO: 97.5 FL (ref 81.4–97.8)
PLATELET # BLD AUTO: 138 K/UL (ref 164–446)
PMV BLD AUTO: 12.5 FL (ref 9–12.9)
POTASSIUM SERPL-SCNC: 5.4 MMOL/L (ref 3.6–5.5)
RBC # BLD AUTO: 4.82 M/UL (ref 4.7–6.1)
SODIUM SERPL-SCNC: 142 MMOL/L (ref 135–145)
WBC # BLD AUTO: 9.2 K/UL (ref 4.8–10.8)

## 2022-02-16 PROCEDURE — 83970 ASSAY OF PARATHORMONE: CPT

## 2022-02-16 PROCEDURE — 82043 UR ALBUMIN QUANTITATIVE: CPT

## 2022-02-16 PROCEDURE — 82306 VITAMIN D 25 HYDROXY: CPT

## 2022-02-16 PROCEDURE — 36415 COLL VENOUS BLD VENIPUNCTURE: CPT

## 2022-02-16 PROCEDURE — 80048 BASIC METABOLIC PNL TOTAL CA: CPT

## 2022-02-16 PROCEDURE — 82570 ASSAY OF URINE CREATININE: CPT

## 2022-02-16 PROCEDURE — 85027 COMPLETE CBC AUTOMATED: CPT

## 2022-02-17 LAB
25(OH)D3 SERPL-MCNC: 79 NG/ML (ref 30–100)
CREAT UR-MCNC: 32.12 MG/DL
MICROALBUMIN UR-MCNC: <1.2 MG/DL
MICROALBUMIN/CREAT UR: NORMAL MG/G (ref 0–30)
PTH-INTACT SERPL-MCNC: 32.4 PG/ML (ref 14–72)

## 2022-02-24 ENCOUNTER — OFFICE VISIT (OUTPATIENT)
Dept: NEPHROLOGY | Facility: MEDICAL CENTER | Age: 72
End: 2022-02-24
Payer: MEDICARE

## 2022-02-24 VITALS
HEART RATE: 67 BPM | RESPIRATION RATE: 18 BRPM | WEIGHT: 162 LBS | SYSTOLIC BLOOD PRESSURE: 120 MMHG | OXYGEN SATURATION: 97 % | TEMPERATURE: 97.1 F | HEIGHT: 69 IN | DIASTOLIC BLOOD PRESSURE: 66 MMHG | BODY MASS INDEX: 23.99 KG/M2

## 2022-02-24 DIAGNOSIS — D64.9 ANEMIA, UNSPECIFIED TYPE: ICD-10-CM

## 2022-02-24 DIAGNOSIS — R80.9 MICROALBUMINURIA: ICD-10-CM

## 2022-02-24 DIAGNOSIS — N18.31 STAGE 3A CHRONIC KIDNEY DISEASE: ICD-10-CM

## 2022-02-24 DIAGNOSIS — N20.0 NEPHROLITHIASIS: ICD-10-CM

## 2022-02-24 DIAGNOSIS — I10 PRIMARY HYPERTENSION: ICD-10-CM

## 2022-02-24 DIAGNOSIS — E55.9 VITAMIN D DEFICIENCY: ICD-10-CM

## 2022-02-24 PROCEDURE — 99214 OFFICE O/P EST MOD 30 MIN: CPT | Performed by: INTERNAL MEDICINE

## 2022-02-24 ASSESSMENT — ENCOUNTER SYMPTOMS
EYES NEGATIVE: 1
HEMOPTYSIS: 0
COUGH: 0
SINUS PAIN: 0
WEIGHT LOSS: 0
NAUSEA: 0
PALPITATIONS: 0
SHORTNESS OF BREATH: 0
MYALGIAS: 0
NECK PAIN: 0
FLANK PAIN: 0
BACK PAIN: 1
ORTHOPNEA: 0
ABDOMINAL PAIN: 0
VOMITING: 0
FEVER: 0
WHEEZING: 0
CHILLS: 0

## 2022-02-24 ASSESSMENT — FIBROSIS 4 INDEX: FIB4 SCORE: 2.4

## 2022-02-24 NOTE — PROGRESS NOTES
Subjective     Deacon Pulido is a 71 y.o. male who presents with Follow-Up            HPI  Deacon is coming today for  of CKD III woth worsening creatinine level  Doing well, no complaints   No dysuria/hematuria flank pain  Long term hx/of HTN  (+) nephrolithiasis  CKD III a -worse to IIIb -creat 1.26- worse to 1.74 - improved to 1.3 -baseline  Had episodes of low BP -reduced losartan dose -now BP well controlled  Family hx/of HTN    Review of Systems   Constitutional: Negative for chills, fever, malaise/fatigue and weight loss.   HENT: Negative for congestion, hearing loss and sinus pain.    Eyes: Negative.    Respiratory: Negative for cough, hemoptysis, shortness of breath and wheezing.    Cardiovascular: Negative for chest pain, palpitations, orthopnea and leg swelling.   Gastrointestinal: Negative for abdominal pain, nausea and vomiting.   Genitourinary: Positive for frequency and urgency. Negative for dysuria, flank pain and hematuria.   Musculoskeletal: Positive for back pain and joint pain. Negative for myalgias and neck pain.   Skin: Negative.    All other systems reviewed and are negative.         Past Medical History:   Diagnosis Date   • Allergy    • Cervical radiculopathy 2/16/2018   • Family history of dementia    • Family history of hypertension    • Family history of stroke    • Hypertension    • Kidney stones    • Multiple actinic keratoses 2/28/2019   • NAFL (nonalcoholic fatty liver) 3/30/2018    Dx on CT Chest 2/24/18   • SK (seborrheic keratosis) 6/30/2020   • Stage 3 chronic kidney disease (HCC) 6/30/2020   • Tobacco use 10/22/2013   • Type 2 diabetes mellitus without complication, without long-term current use of insulin (HCC) 9/17/2014       Family History   Problem Relation Age of Onset   • Asthma Mother    • Heart Attack Mother    • Hypertension Father    • Stroke Father    • Dementia Father    • Lung Disease Father         heavy smoker   • Other Brother         Morbid Obesity   •  "Alcohol/Drug Brother         prescription drug problem   • Alcohol/Drug Brother         street drugs   • Cancer Paternal Uncle         Pancreatic   • Colon Cancer Cousin    • Cancer Paternal Aunt         colon ca        Social History     Socioeconomic History   • Marital status:    Tobacco Use   • Smoking status: Current Every Day Smoker     Packs/day: 1.00     Years: 50.00     Pack years: 50.00     Types: Cigarettes   • Smokeless tobacco: Never Used   • Tobacco comment: 05/09/17 -- currently 1/2 pack per day   Substance and Sexual Activity   • Alcohol use: Not Currently     Alcohol/week: 0.0 oz     Comment: 1x/month, 1 drink per time   • Drug use: No   • Sexual activity: Yes     Partners: Female     Birth control/protection: Post-Menopausal     Comment: ,  multiple companies         Objective     /66 (BP Location: Right arm, Patient Position: Sitting)   Pulse 67   Temp 36.2 °C (97.1 °F)   Resp 18   Ht 1.753 m (5' 9\")   Wt 73.5 kg (162 lb)   SpO2 97%   BMI 23.92 kg/m²      Physical Exam  Vitals reviewed.   Constitutional:       General: He is not in acute distress.     Appearance: Normal appearance. He is well-developed. He is not diaphoretic.   HENT:      Head: Normocephalic and atraumatic.      Nose: Nose normal.      Mouth/Throat:      Mouth: Mucous membranes are moist.      Pharynx: Oropharynx is clear.   Eyes:      General: No scleral icterus.     Extraocular Movements: Extraocular movements intact.      Conjunctiva/sclera: Conjunctivae normal.      Pupils: Pupils are equal, round, and reactive to light.   Cardiovascular:      Rate and Rhythm: Normal rate and regular rhythm.      Pulses: Normal pulses.      Heart sounds: Normal heart sounds.     No friction rub. No gallop.   Pulmonary:      Effort: Pulmonary effort is normal. No respiratory distress.      Breath sounds: Normal breath sounds. No wheezing.   Abdominal:      General: Bowel sounds are normal.      Palpations: Abdomen " is soft.      Tenderness: There is no right CVA tenderness or left CVA tenderness.   Musculoskeletal:      Cervical back: Normal range of motion and neck supple.      Right lower leg: No edema.      Left lower leg: No edema.   Skin:     General: Skin is warm.      Coloration: Skin is not pale.      Findings: No erythema or rash.   Neurological:      General: No focal deficit present.      Mental Status: He is alert and oriented to person, place, and time.      Cranial Nerves: No cranial nerve deficit.      Coordination: Coordination normal.   Psychiatric:         Mood and Affect: Mood normal.         Behavior: Behavior normal.         Thought Content: Thought content normal.         Judgment: Judgment normal.                    Laboratory results reviewed: d/w Pt  Lab Results   Component Value Date/Time    CREATININE 1.33 02/16/2022 11:02 AM    POTASSIUM 5.4 02/16/2022 11:02 AM         Assessment & Plan        1. Stage 3b chronic kidney disease (HCC) improved to stage 3 a      Creat level improved -back to baseline       Keep well hydrated    2. Primary hypertension      Low BP improved -well controlled now -to monitor      3. Vitamin D deficiency      Well controlled --to monitor      4. Microalbuminuria      negative      5. Nephrolithiasis      left kidney stone - non obstructing      6. Anemia, unspecified type      Hb level stable -WNL            Recs:  Continue current treatment  Keep well hydrated  Monitor BP  Avoid NSAID's  Low salt diet  F/u in 6 months

## 2022-04-15 ENCOUNTER — OFFICE VISIT (OUTPATIENT)
Dept: MEDICAL GROUP | Facility: LAB | Age: 72
End: 2022-04-15
Payer: MEDICARE

## 2022-04-15 VITALS
HEIGHT: 69 IN | RESPIRATION RATE: 12 BRPM | DIASTOLIC BLOOD PRESSURE: 62 MMHG | TEMPERATURE: 97.9 F | OXYGEN SATURATION: 96 % | HEART RATE: 74 BPM | SYSTOLIC BLOOD PRESSURE: 140 MMHG | WEIGHT: 169 LBS | BODY MASS INDEX: 25.03 KG/M2

## 2022-04-15 DIAGNOSIS — R21 RASH AND NONSPECIFIC SKIN ERUPTION: ICD-10-CM

## 2022-04-15 PROCEDURE — 99214 OFFICE O/P EST MOD 30 MIN: CPT | Performed by: FAMILY MEDICINE

## 2022-04-15 RX ORDER — BETAMETHASONE DIPROPIONATE 0.5 MG/G
1 CREAM TOPICAL 2 TIMES DAILY
Qty: 45 G | Refills: 3 | Status: SHIPPED | OUTPATIENT
Start: 2022-04-15 | End: 2023-09-05

## 2022-04-15 ASSESSMENT — PATIENT HEALTH QUESTIONNAIRE - PHQ9: CLINICAL INTERPRETATION OF PHQ2 SCORE: 0

## 2022-04-15 ASSESSMENT — FIBROSIS 4 INDEX: FIB4 SCORE: 2.4

## 2022-04-15 NOTE — PROGRESS NOTES
Chief Complaint:   Chief Complaint   Patient presents with   • Rash     Itchy, scaly rash on legs and torso for months.  He did a trial of kenalog cream       HPI:Established patient   Deacon Pulido is a 71 y.o. female who presents for evaluation of rash, discussed the following today:    1. Rash and nonspecific skin eruption  New concern    Rash has been going on for a long time, patient said its increasing diffusely especially in lower extremities upper extremities his abdominal area and back.  He describes it as itchy and with mild irritation.  No burning sensation or pain.    Denies any changes to his medications or to his routine in regards to diet or creams or lotions or soap  Denies other concerns like joint pain or GI symptoms or respiratory tract symptoms.    Past medical history, family history, social history and medications reviewed and updated in the record.  Today  Current medications, problem list and allergies reviewed in EPIC today  Health maintenance topics are reviewed and updated.    Patient Active Problem List    Diagnosis Date Noted   • Lung nodules 11/30/2021   • Right kidney mass 11/19/2021   • Weight loss 11/19/2021   • Stage 3b chronic kidney disease (HCC) 11/02/2021   • Encounter to establish care with new doctor 10/28/2021   • Prediabetes 10/28/2021   • Stage 3 chronic kidney disease (HCC) 06/30/2020   • SK (seborrheic keratosis) 06/30/2020   • Multiple actinic keratoses 02/28/2019   • NAFL (nonalcoholic fatty liver) 03/30/2018   • Cervical radiculopathy 02/16/2018   • Encounter for screening for lung cancer 05/09/2017   • Coronary atherosclerosis due to calcified coronary lesion 12/14/2016   • Mixed hyperlipidemia with apolipoprotein E4 variant 09/17/2014   • Metabolic syndrome 09/17/2014   • Atherosclerosis of both carotid arteries 08/11/2014   • Abnormal chest CT 08/11/2014   • Screening for osteoporosis 08/07/2014   • Primary osteoarthritis of right knee 10/22/2013   •  Atherosclerosis of aorta (HCC) 10/22/2013   • HTN (hypertension) 10/22/2013   • Tobacco use 10/22/2013   • History of kidney stones      Family History   Problem Relation Age of Onset   • Asthma Mother    • Heart Attack Mother    • Hypertension Father    • Stroke Father    • Dementia Father    • Lung Disease Father         heavy smoker   • Other Brother         Morbid Obesity   • Alcohol/Drug Brother         prescription drug problem   • Alcohol/Drug Brother         street drugs   • Cancer Paternal Uncle         Pancreatic   • Colon Cancer Cousin    • Cancer Paternal Aunt         colon ca      Social History     Socioeconomic History   • Marital status:      Spouse name: Not on file   • Number of children: Not on file   • Years of education: Not on file   • Highest education level: Not on file   Occupational History   • Not on file   Tobacco Use   • Smoking status: Current Every Day Smoker     Packs/day: 1.00     Years: 50.00     Pack years: 50.00     Types: Cigarettes   • Smokeless tobacco: Never Used   • Tobacco comment: 05/09/17 -- currently 1/2 pack per day   Vaping Use   • Vaping Use: Not on file   Substance and Sexual Activity   • Alcohol use: Not Currently     Alcohol/week: 0.0 oz     Comment: 1x/month, 1 drink per time   • Drug use: No   • Sexual activity: Yes     Partners: Female     Birth control/protection: Post-Menopausal     Comment: ,  multiple companies   Other Topics Concern   • Not on file   Social History Narrative   • Not on file     Social Determinants of Health     Financial Resource Strain: Not on file   Food Insecurity: Not on file   Transportation Needs: Not on file   Physical Activity: Not on file   Stress: Not on file   Social Connections: Not on file   Intimate Partner Violence: Not on file   Housing Stability: Not on file       Current Outpatient Medications   Medication Sig Dispense Refill   • betamethasone dipropionate 0.05 % Cream Apply 1 Application topically 2 times a  "day. 45 g 3   • folic acid (FOLVITE) 1 MG Tab Take 1 mg by mouth every day.     • losartan (COZAAR) 50 MG Tab Take 1 Tablet by mouth every day. 90 Tablet 3   • amLODIPine (NORVASC) 10 MG Tab Take 1 Tablet by mouth every day. 90 Tablet 3   • rosuvastatin (CRESTOR) 10 MG Tab TAKE ONE TABLET BY MOUTH EVERY EVENING 90 Tablet 3   • ezetimibe (ZETIA) 10 MG Tab Take 1 Tablet by mouth every day. 90 Tablet 3   • beclomethasone (QVAR) 40 MCG/ACT inhaler Inhale 1 Puff by mouth 2 Times a Day. 3 Inhaler 3   • albuterol 108 (90 Base) MCG/ACT Aero Soln inhalation aerosol Inhale 2 Puffs by mouth every 6 hours as needed for Shortness of Breath. 8.5 g 12   • Cholecalciferol (VITAMIN D) 2000 UNITS Cap Take 1 Cap by mouth every day.     • Cyanocobalamin (B-12) 1000 MCG TBCR Take 1 Each by mouth every day.       No current facility-administered medications for this visit.         Review Of Systems  As documented in HPI above  PHYSICAL EXAMINATION:    /62   Pulse 74   Temp 36.6 °C (97.9 °F) (Temporal)   Resp 12   Ht 1.753 m (5' 9\")   Wt 76.7 kg (169 lb)   SpO2 96%   BMI 24.96 kg/m²   Gen.: Well-developed, well-nourished, no apparent distress, pleasant and cooperative with the examination  HEENT: Normocephalic/atraumatic, sinuses nontender with palpation, TMs clear, nares patent with pink mucosa and clear rhinorrhea, oropharynx clear  Neck: No JVD or bruits, no adenopathy  Skin: Rash can be described as erythematous scaly rash in patches around the elbows and lower extremities and the back area in his arms.  Suggestive of possible psoriatic rash  Extremities: No cyanosis, clubbing or edema        ASSESSMENT/Plan:  1. Rash and nonspecific skin eruption   new concern, I am highly suspecting psoriasis rash.  Advised to use steroid cream and follow-up with dermatology.  Asymptomatic otherwise  betamethasone dipropionate 0.05 % Cream    Referral to Dermatology       Please note that this dictation was created using voice " recognition software. I have made every reasonable attempt to correct obvious errors but there may be errors of grammar and content that I may have overlooked prior to finalization of this note.

## 2022-04-30 ENCOUNTER — HOSPITAL ENCOUNTER (OUTPATIENT)
Dept: LAB | Facility: MEDICAL CENTER | Age: 72
End: 2022-04-30
Attending: INTERNAL MEDICINE
Payer: MEDICARE

## 2022-04-30 DIAGNOSIS — N20.0 NEPHROLITHIASIS: ICD-10-CM

## 2022-04-30 DIAGNOSIS — N18.31 STAGE 3A CHRONIC KIDNEY DISEASE: ICD-10-CM

## 2022-04-30 DIAGNOSIS — I10 PRIMARY HYPERTENSION: ICD-10-CM

## 2022-04-30 LAB
ANION GAP SERPL CALC-SCNC: 9 MMOL/L (ref 7–16)
BUN SERPL-MCNC: 18 MG/DL (ref 8–22)
CALCIUM SERPL-MCNC: 9.3 MG/DL (ref 8.5–10.5)
CHLORIDE SERPL-SCNC: 106 MMOL/L (ref 96–112)
CO2 SERPL-SCNC: 24 MMOL/L (ref 20–33)
CREAT SERPL-MCNC: 1.59 MG/DL (ref 0.5–1.4)
GFR SERPLBLD CREATININE-BSD FMLA CKD-EPI: 45 ML/MIN/1.73 M 2
GLUCOSE SERPL-MCNC: 129 MG/DL (ref 65–99)
POTASSIUM SERPL-SCNC: 4.8 MMOL/L (ref 3.6–5.5)
SODIUM SERPL-SCNC: 139 MMOL/L (ref 135–145)

## 2022-04-30 PROCEDURE — 36415 COLL VENOUS BLD VENIPUNCTURE: CPT

## 2022-04-30 PROCEDURE — 80048 BASIC METABOLIC PNL TOTAL CA: CPT

## 2022-05-04 ENCOUNTER — OFFICE VISIT (OUTPATIENT)
Dept: NEPHROLOGY | Facility: MEDICAL CENTER | Age: 72
End: 2022-05-04
Payer: MEDICARE

## 2022-05-04 VITALS
BODY MASS INDEX: 24.44 KG/M2 | SYSTOLIC BLOOD PRESSURE: 132 MMHG | TEMPERATURE: 98.2 F | HEIGHT: 69 IN | DIASTOLIC BLOOD PRESSURE: 70 MMHG | HEART RATE: 74 BPM | OXYGEN SATURATION: 98 % | WEIGHT: 165 LBS

## 2022-05-04 DIAGNOSIS — E55.9 VITAMIN D DEFICIENCY: ICD-10-CM

## 2022-05-04 DIAGNOSIS — D64.9 ANEMIA, UNSPECIFIED TYPE: ICD-10-CM

## 2022-05-04 DIAGNOSIS — R80.9 MICROALBUMINURIA: ICD-10-CM

## 2022-05-04 DIAGNOSIS — N18.31 STAGE 3A CHRONIC KIDNEY DISEASE: ICD-10-CM

## 2022-05-04 DIAGNOSIS — I10 PRIMARY HYPERTENSION: ICD-10-CM

## 2022-05-04 PROCEDURE — 99214 OFFICE O/P EST MOD 30 MIN: CPT | Performed by: INTERNAL MEDICINE

## 2022-05-04 ASSESSMENT — ENCOUNTER SYMPTOMS
ORTHOPNEA: 0
PALPITATIONS: 0
WHEEZING: 0
FLANK PAIN: 0
BACK PAIN: 0
WEIGHT LOSS: 0
SORE THROAT: 0
VOMITING: 0
MYALGIAS: 0
EYES NEGATIVE: 1
NAUSEA: 0
SINUS PAIN: 0
CHILLS: 0
COUGH: 0
HEMOPTYSIS: 0
SHORTNESS OF BREATH: 0
FEVER: 0
DIARRHEA: 0
ABDOMINAL PAIN: 0
NECK PAIN: 0

## 2022-05-04 ASSESSMENT — FIBROSIS 4 INDEX: FIB4 SCORE: 2.4

## 2022-05-04 NOTE — PROGRESS NOTES
Subjective     Deacon Pulido is a 71 y.o. male who presents with Follow-Up and Chronic Kidney Disease            Chronic Kidney Disease  Pertinent negatives include no abdominal pain, chest pain, chills, congestion, coughing, fever, myalgias, nausea, neck pain, sore throat or vomiting.     Deacon is coming today for  of CKD III woth worsening creatinine level  Doing well, no complaints   No dysuria/hematuria flank pain  Long term hx/of HTN  (+) nephrolithiasis -stable  CKD III a  -creat level slightly worse 1.33-1.59  HTN: BP very well controlled    Review of Systems   Constitutional: Negative for chills, fever, malaise/fatigue and weight loss.   HENT: Negative for congestion, hearing loss, sinus pain and sore throat.    Eyes: Negative.    Respiratory: Negative for cough, hemoptysis, shortness of breath and wheezing.    Cardiovascular: Negative for chest pain, palpitations, orthopnea and leg swelling.   Gastrointestinal: Negative for abdominal pain, diarrhea, nausea and vomiting.   Genitourinary: Negative for dysuria, flank pain, frequency, hematuria and urgency.   Musculoskeletal: Negative for back pain, joint pain, myalgias and neck pain.   Skin: Negative.    All other systems reviewed and are negative.         Past Medical History:   Diagnosis Date   • Allergy    • Cervical radiculopathy 2/16/2018   • Family history of dementia    • Family history of hypertension    • Family history of stroke    • Hypertension    • Kidney stones    • Multiple actinic keratoses 2/28/2019   • NAFL (nonalcoholic fatty liver) 3/30/2018    Dx on CT Chest 2/24/18   • SK (seborrheic keratosis) 6/30/2020   • Stage 3 chronic kidney disease (HCC) 6/30/2020   • Tobacco use 10/22/2013   • Type 2 diabetes mellitus without complication, without long-term current use of insulin (HCC) 9/17/2014       Family History   Problem Relation Age of Onset   • Asthma Mother    • Heart Attack Mother    • Hypertension Father    • Stroke Father    •  "Dementia Father    • Lung Disease Father         heavy smoker   • Other Brother         Morbid Obesity   • Alcohol/Drug Brother         prescription drug problem   • Alcohol/Drug Brother         street drugs   • Cancer Paternal Uncle         Pancreatic   • Colon Cancer Cousin    • Cancer Paternal Aunt         colon ca        Social History     Socioeconomic History   • Marital status:    Tobacco Use   • Smoking status: Current Every Day Smoker     Packs/day: 1.00     Years: 50.00     Pack years: 50.00     Types: Cigarettes   • Smokeless tobacco: Never Used   • Tobacco comment: 05/09/17 -- currently 1/2 pack per day   Substance and Sexual Activity   • Alcohol use: Not Currently     Alcohol/week: 0.0 oz     Comment: 1x/month, 1 drink per time   • Drug use: No   • Sexual activity: Yes     Partners: Female     Birth control/protection: Post-Menopausal     Comment: ,  multiple companies         Objective     /70 (BP Location: Right arm, Patient Position: Sitting)   Pulse 74   Temp 36.8 °C (98.2 °F) (Temporal)   Ht 1.753 m (5' 9\")   Wt 74.8 kg (165 lb)   SpO2 98%   BMI 24.37 kg/m²      Physical Exam  Vitals reviewed.   Constitutional:       General: He is not in acute distress.     Appearance: Normal appearance. He is well-developed. He is not diaphoretic.   HENT:      Head: Normocephalic and atraumatic.      Nose: Nose normal.   Eyes:      Extraocular Movements: Extraocular movements intact.      Conjunctiva/sclera: Conjunctivae normal.      Pupils: Pupils are equal, round, and reactive to light.   Cardiovascular:      Rate and Rhythm: Normal rate and regular rhythm.      Pulses: Normal pulses.      Heart sounds: Normal heart sounds.     No friction rub. No gallop.   Pulmonary:      Effort: Pulmonary effort is normal. No respiratory distress.      Breath sounds: Normal breath sounds. No wheezing or rales.   Abdominal:      General: Bowel sounds are normal. There is no distension.      " Palpations: Abdomen is soft. There is no mass.      Tenderness: There is no abdominal tenderness. There is no right CVA tenderness, left CVA tenderness or guarding.   Musculoskeletal:      Cervical back: Normal range of motion and neck supple.      Right lower leg: No edema.      Left lower leg: No edema.   Skin:     General: Skin is warm.      Coloration: Skin is not pale.      Findings: No erythema or rash.   Neurological:      General: No focal deficit present.      Mental Status: He is alert and oriented to person, place, and time.      Cranial Nerves: No cranial nerve deficit.      Coordination: Coordination normal.   Psychiatric:         Mood and Affect: Mood normal.         Behavior: Behavior normal.         Thought Content: Thought content normal.         Judgment: Judgment normal.                    Laboratory results reviewed: d/w Pt  Lab Results   Component Value Date/Time    CREATININE 1.59 (H) 04/30/2022 08:58 AM    POTASSIUM 4.8 04/30/2022 08:58 AM         Assessment & Plan        1. Stage 3a chronic kidney disease (HCC)       Creat level slightly worse -to monitor       Keep well hydrated    2. Primary hypertension      BP well controlled now -to monitor      3. Vitamin D deficiency      Well controlled --to monitor      4. Microalbuminuria      negative      5. Nephrolithiasis      left kidney stone - non obstructing      6. Anemia, unspecified type      Hb level stable -WNL            Recs:  Continue current treatment  Keep well hydrated  Monitor BP  Avoid NSAID's  Low salt diet  F/u in 4 months

## 2022-05-17 ENCOUNTER — OFFICE VISIT (OUTPATIENT)
Dept: MEDICAL GROUP | Facility: LAB | Age: 72
End: 2022-05-17
Payer: MEDICARE

## 2022-05-17 VITALS
SYSTOLIC BLOOD PRESSURE: 124 MMHG | BODY MASS INDEX: 24.29 KG/M2 | HEIGHT: 69 IN | TEMPERATURE: 97.2 F | HEART RATE: 66 BPM | DIASTOLIC BLOOD PRESSURE: 60 MMHG | WEIGHT: 164 LBS | OXYGEN SATURATION: 98 % | RESPIRATION RATE: 16 BRPM

## 2022-05-17 DIAGNOSIS — D69.6 THROMBOCYTOPENIA (HCC): ICD-10-CM

## 2022-05-17 DIAGNOSIS — R21 RASH AND NONSPECIFIC SKIN ERUPTION: ICD-10-CM

## 2022-05-17 DIAGNOSIS — Z12.11 COLON CANCER SCREENING: ICD-10-CM

## 2022-05-17 PROCEDURE — 99213 OFFICE O/P EST LOW 20 MIN: CPT | Performed by: FAMILY MEDICINE

## 2022-05-17 RX ORDER — KETOCONAZOLE 20 MG/G
CREAM TOPICAL
COMMUNITY
Start: 2022-05-05

## 2022-05-17 ASSESSMENT — FIBROSIS 4 INDEX: FIB4 SCORE: 2.4

## 2022-05-17 NOTE — PROGRESS NOTES
Chief Complaint:   Chief Complaint   Patient presents with   • Follow-Up     1 month followup       HPI: Established patient  Deacon Pulido is a 71 y.o. male who presents for follow-up, discussed the following today:    1. Rash and nonspecific skin eruption  Chronic  Established with dermatology already, biopsy was performed, no records available yet.  Will call for records.  Patient said after getting a steroid injection the rash of the lower extremity has improved but not resolved completely.    2. Colon cancer screening  Due for colon cancer screening.  Already 10 years from previous colonoscopy, asymptomatic denies bowel habits changes    3. Thrombocytopenia   Noted in his previous lab work, platelet level around 150,000.  Patient reports easy bruisability, especially if he is working in the yard or doing heavy work.  Denies spontaneous bleeding from nose or other body orifices.          Past medical history, family history, social history and medications reviewed and updated in the record. today  Current medications, problem list and allergies reviewed in EPIC today  Health maintenance topics are reviewed and updated.    Patient Active Problem List    Diagnosis Date Noted   • Lung nodules 11/30/2021   • Right kidney mass 11/19/2021   • Weight loss 11/19/2021   • Stage 3b chronic kidney disease (HCC) 11/02/2021   • Encounter to establish care with new doctor 10/28/2021   • Prediabetes 10/28/2021   • Stage 3 chronic kidney disease (HCC) 06/30/2020   • SK (seborrheic keratosis) 06/30/2020   • Multiple actinic keratoses 02/28/2019   • NAFL (nonalcoholic fatty liver) 03/30/2018   • Cervical radiculopathy 02/16/2018   • Encounter for screening for lung cancer 05/09/2017   • Coronary atherosclerosis due to calcified coronary lesion 12/14/2016   • Mixed hyperlipidemia with apolipoprotein E4 variant 09/17/2014   • Metabolic syndrome 09/17/2014   • Atherosclerosis of both carotid arteries 08/11/2014   • Abnormal  chest CT 08/11/2014   • Screening for osteoporosis 08/07/2014   • Primary osteoarthritis of right knee 10/22/2013   • Atherosclerosis of aorta (HCC) 10/22/2013   • HTN (hypertension) 10/22/2013   • Tobacco use 10/22/2013   • History of kidney stones      Family History   Problem Relation Age of Onset   • Asthma Mother    • Heart Attack Mother    • Hypertension Father    • Stroke Father    • Dementia Father    • Lung Disease Father         heavy smoker   • Other Brother         Morbid Obesity   • Alcohol/Drug Brother         prescription drug problem   • Alcohol/Drug Brother         street drugs   • Cancer Paternal Uncle         Pancreatic   • Colon Cancer Cousin    • Cancer Paternal Aunt         colon ca      Social History     Socioeconomic History   • Marital status:      Spouse name: Not on file   • Number of children: Not on file   • Years of education: Not on file   • Highest education level: Not on file   Occupational History   • Not on file   Tobacco Use   • Smoking status: Current Every Day Smoker     Packs/day: 1.00     Years: 50.00     Pack years: 50.00     Types: Cigarettes   • Smokeless tobacco: Never Used   • Tobacco comment: 05/09/17 -- currently 1/2 pack per day   Vaping Use   • Vaping Use: Not on file   Substance and Sexual Activity   • Alcohol use: Not Currently     Alcohol/week: 0.0 oz     Comment: 1x/month, 1 drink per time   • Drug use: No   • Sexual activity: Yes     Partners: Female     Birth control/protection: Post-Menopausal     Comment: ,  multiple companies   Other Topics Concern   • Not on file   Social History Narrative   • Not on file     Social Determinants of Health     Financial Resource Strain: Not on file   Food Insecurity: Not on file   Transportation Needs: Not on file   Physical Activity: Not on file   Stress: Not on file   Social Connections: Not on file   Intimate Partner Violence: Not on file   Housing Stability: Not on file     Current Outpatient Medications  "  Medication Sig Dispense Refill   • ketoconazole (NIZORAL) 2 % Cream      • betamethasone dipropionate 0.05 % Cream Apply 1 Application topically 2 times a day. 45 g 3   • folic acid (FOLVITE) 1 MG Tab Take 1 mg by mouth every day.     • losartan (COZAAR) 50 MG Tab Take 1 Tablet by mouth every day. 90 Tablet 3   • amLODIPine (NORVASC) 10 MG Tab Take 1 Tablet by mouth every day. 90 Tablet 3   • rosuvastatin (CRESTOR) 10 MG Tab TAKE ONE TABLET BY MOUTH EVERY EVENING 90 Tablet 3   • ezetimibe (ZETIA) 10 MG Tab Take 1 Tablet by mouth every day. 90 Tablet 3   • beclomethasone (QVAR) 40 MCG/ACT inhaler Inhale 1 Puff by mouth 2 Times a Day. 3 Inhaler 3   • albuterol 108 (90 Base) MCG/ACT Aero Soln inhalation aerosol Inhale 2 Puffs by mouth every 6 hours as needed for Shortness of Breath. 8.5 g 12   • Cholecalciferol (VITAMIN D) 2000 UNITS Cap Take 1 Cap by mouth every day.     • Cyanocobalamin (B-12) 1000 MCG TBCR Take 1 Each by mouth every day.       No current facility-administered medications for this visit.           Review Of Systems  As documented in HPI above  PHYSICAL EXAMINATION:    /60 (BP Location: Left arm, Patient Position: Sitting, BP Cuff Size: Adult)   Pulse 66   Temp 36.2 °C (97.2 °F) (Temporal)   Resp 16   Ht 1.753 m (5' 9\")   Wt 74.4 kg (164 lb)   SpO2 98%   BMI 24.22 kg/m²   Gen.: Well-developed, well-nourished, no apparent distress, pleasant and cooperative with the examination  HEENT: Normocephalic/atraumatic,   Neck: No JVD or bruits, no adenopathy  Cor: Regular rate and rhythm without murmur gallop or rub  Lungs: Clear to auscultation with equal breath sounds bilaterally. No wheezes, rhonchi.  Abdomen: Soft nontender without hepatosplenomegaly or masses appreciated, normoactive bowel sounds  Extremities: No cyanosis, clubbing or edema          ASSESSMENT/Plan:    1. Rash and nonspecific skin eruption   chronic, unclear etiology or diagnosis.  Improved but not resolved completely, " patient will follow-up with dermatology next week, advised to get records.   2. Colon cancer screening  Referral to Gastroenterology   3. Thrombocytopenia (HCC)   chronic, will keep monitoring.  Repeat CBC before next visit.  CBC WITH DIFFERENTIAL       Please note that this dictation was created using voice recognition software. I have made every reasonable attempt to correct obvious errors but there may be errors of grammar and content that I may have overlooked prior to finalization of this note.

## 2022-05-17 NOTE — LETTER
[facility]  Marco Rizvi M.D.  [address]  [place] [address]  Fax: [phone]   Authorization for Release/Disclosure of   Protected Health Information   Name: MICHAEL SALAZAR : 1950 SSN: [id number]   Address: [address] [place] [address]  [place] [address] Phone:    [phone] (home) [phone] (work)   I authorize the entity listed below to release/disclose the PHI below to:   [facility]/Marco Rizvi M.D.    Provider or Entity Name:     Address   City, State, [facility]   Phone: [phone]      Fax: [phone]     Reason for request: continuity of care   Information to be released:    [  ] LAST COLONOSCOPY,  including any PATH REPORT and follow-up  [  ] LAST FIT/COLOGUARD RESULT [  ] LAST DEXA  [  ] LAST MAMMOGRAM  [  ] LAST PAP  [  ] LAST LABS [x  ] RETINA EXAM REPORT  [  ] IMMUNIZATION RECORDS  [  ] Release all info      [  ] Check here and initial the line next to each item to release ALL health information INCLUDING  _____ Care and treatment for drug and / or alcohol abuse  _____ HIV testing, infection status, or AIDS  _____ Genetic Testing    DATES OF SERVICE OR TIME PERIOD TO BE DISCLOSED: _____________  I understand and acknowledge that:  * This Authorization may be revoked at any time by you in writing, except if your health information has already been used or disclosed.  * Your health information that will be used or disclosed as a result of you signing this authorization could be re-disclosed by the recipient. If this occurs, your re-disclosed health information may no longer be protected by State or Federal laws.  * You may refuse to sign this Authorization. Your refusal will not affect your ability to obtain treatment.  * This Authorization becomes effective upon signing and will  on (date) __________.      If no date is indicated, this Authorization will  one (1) year from the signature date.    Name: Michael Salazar    Signature: Continuity of Care   Date:      5/17/2022       PLEASE FAX REQUESTED RECORDS BACK TO: [phone]

## 2022-08-13 ENCOUNTER — HOSPITAL ENCOUNTER (OUTPATIENT)
Dept: LAB | Facility: MEDICAL CENTER | Age: 72
End: 2022-08-13
Attending: FAMILY MEDICINE
Payer: MEDICARE

## 2022-08-13 DIAGNOSIS — D69.6 THROMBOCYTOPENIA (HCC): ICD-10-CM

## 2022-08-13 LAB
BASOPHILS # BLD AUTO: 0.8 % (ref 0–1.8)
BASOPHILS # BLD: 0.07 K/UL (ref 0–0.12)
EOSINOPHIL # BLD AUTO: 0.34 K/UL (ref 0–0.51)
EOSINOPHIL NFR BLD: 3.8 % (ref 0–6.9)
ERYTHROCYTE [DISTWIDTH] IN BLOOD BY AUTOMATED COUNT: 48.2 FL (ref 35.9–50)
HCT VFR BLD AUTO: 42.7 % (ref 42–52)
HGB BLD-MCNC: 14.1 G/DL (ref 14–18)
IMM GRANULOCYTES # BLD AUTO: 0.04 K/UL (ref 0–0.11)
IMM GRANULOCYTES NFR BLD AUTO: 0.5 % (ref 0–0.9)
LYMPHOCYTES # BLD AUTO: 1.56 K/UL (ref 1–4.8)
LYMPHOCYTES NFR BLD: 17.6 % (ref 22–41)
MCH RBC QN AUTO: 32.2 PG (ref 27–33)
MCHC RBC AUTO-ENTMCNC: 33 G/DL (ref 33.7–35.3)
MCV RBC AUTO: 97.5 FL (ref 81.4–97.8)
MONOCYTES # BLD AUTO: 0.69 K/UL (ref 0–0.85)
MONOCYTES NFR BLD AUTO: 7.8 % (ref 0–13.4)
NEUTROPHILS # BLD AUTO: 6.16 K/UL (ref 1.82–7.42)
NEUTROPHILS NFR BLD: 69.5 % (ref 44–72)
NRBC # BLD AUTO: 0 K/UL
NRBC BLD-RTO: 0 /100 WBC
PLATELET # BLD AUTO: 153 K/UL (ref 164–446)
PMV BLD AUTO: 12.3 FL (ref 9–12.9)
RBC # BLD AUTO: 4.38 M/UL (ref 4.7–6.1)
WBC # BLD AUTO: 8.9 K/UL (ref 4.8–10.8)

## 2022-08-13 PROCEDURE — 85025 COMPLETE CBC W/AUTO DIFF WBC: CPT

## 2022-08-13 PROCEDURE — 36415 COLL VENOUS BLD VENIPUNCTURE: CPT

## 2022-08-18 ENCOUNTER — OFFICE VISIT (OUTPATIENT)
Dept: MEDICAL GROUP | Facility: LAB | Age: 72
End: 2022-08-18
Payer: MEDICARE

## 2022-08-18 VITALS
TEMPERATURE: 98.1 F | RESPIRATION RATE: 16 BRPM | WEIGHT: 160 LBS | SYSTOLIC BLOOD PRESSURE: 110 MMHG | HEART RATE: 68 BPM | DIASTOLIC BLOOD PRESSURE: 60 MMHG | HEIGHT: 69 IN | OXYGEN SATURATION: 98 % | BODY MASS INDEX: 23.7 KG/M2

## 2022-08-18 DIAGNOSIS — R73.03 PREDIABETES: ICD-10-CM

## 2022-08-18 DIAGNOSIS — Z76.89 ENCOUNTER TO ESTABLISH CARE WITH NEW DOCTOR: ICD-10-CM

## 2022-08-18 DIAGNOSIS — D69.6 THROMBOCYTOPENIA (HCC): ICD-10-CM

## 2022-08-18 LAB
HBA1C MFR BLD: 5.6 % (ref 0–5.6)
INT CON NEG: NEGATIVE
INT CON POS: POSITIVE

## 2022-08-18 PROCEDURE — 83036 HEMOGLOBIN GLYCOSYLATED A1C: CPT | Performed by: FAMILY MEDICINE

## 2022-08-18 PROCEDURE — 99213 OFFICE O/P EST LOW 20 MIN: CPT | Performed by: FAMILY MEDICINE

## 2022-08-18 ASSESSMENT — FIBROSIS 4 INDEX: FIB4 SCORE: 2.2

## 2022-08-18 NOTE — PROGRESS NOTES
Chief Complaint:   Chief Complaint   Patient presents with    Lab Results       HPI: Established patient  Deacon Pulido is a 72 y.o. male who presents for follow-up, discussed the following today:        1. Prediabetes  Patient A1c is back to normal 5.6 today    2. Thrombocytopenia (HCC)  Chronic, ongoing  Patient is symptomatic for excessive bruising, no active bleeding though  Denies alcohol intake.  Normal liver enzymes.  Discussed with the patient referral to see hematologist for further evaluation    3. Health care maintinance     Recently had his colonoscopy last week, polyps removed.  No concerns      Past medical history, family history, social history and medications reviewed and updated in the record. today  Current medications, problem list and allergies reviewed in Epic today  Health maintenance topics are reviewed and updated.    Patient Active Problem List    Diagnosis Date Noted    Thrombocytopenia (HCC) 08/18/2022    Lung nodules 11/30/2021    Right kidney mass 11/19/2021    Weight loss 11/19/2021    Stage 3b chronic kidney disease (HCC) 11/02/2021    Encounter to establish care with new doctor 10/28/2021    Prediabetes 10/28/2021    Stage 3 chronic kidney disease (HCC) 06/30/2020    SK (seborrheic keratosis) 06/30/2020    Multiple actinic keratoses 02/28/2019    NAFL (nonalcoholic fatty liver) 03/30/2018    Cervical radiculopathy 02/16/2018    Encounter for screening for lung cancer 05/09/2017    Coronary atherosclerosis due to calcified coronary lesion 12/14/2016    Mixed hyperlipidemia with apolipoprotein E4 variant 09/17/2014    Metabolic syndrome 09/17/2014    Atherosclerosis of both carotid arteries 08/11/2014    Abnormal chest CT 08/11/2014    Screening for osteoporosis 08/07/2014    Primary osteoarthritis of right knee 10/22/2013    Atherosclerosis of aorta (HCC) 10/22/2013    HTN (hypertension) 10/22/2013    Tobacco use 10/22/2013    History of kidney stones      Family History   Problem  Relation Age of Onset    Asthma Mother     Heart Attack Mother     Hypertension Father     Stroke Father     Dementia Father     Lung Disease Father         heavy smoker    Other Brother         Morbid Obesity    Alcohol/Drug Brother         prescription drug problem    Alcohol/Drug Brother         street drugs    Cancer Paternal Uncle         Pancreatic    Colon Cancer Cousin     Cancer Paternal Aunt         colon ca      Social History     Socioeconomic History    Marital status:      Spouse name: Not on file    Number of children: Not on file    Years of education: Not on file    Highest education level: Not on file   Occupational History    Not on file   Tobacco Use    Smoking status: Every Day     Packs/day: 1.00     Years: 50.00     Pack years: 50.00     Types: Cigarettes    Smokeless tobacco: Never    Tobacco comments:     05/09/17 -- currently 1/2 pack per day   Vaping Use    Vaping Use: Not on file   Substance and Sexual Activity    Alcohol use: Not Currently     Alcohol/week: 0.0 oz     Comment: 1x/month, 1 drink per time    Drug use: No    Sexual activity: Yes     Partners: Female     Birth control/protection: Post-Menopausal     Comment: ,  multiple companies   Other Topics Concern    Not on file   Social History Narrative    Not on file     Social Determinants of Health     Financial Resource Strain: Not on file   Food Insecurity: Not on file   Transportation Needs: Not on file   Physical Activity: Not on file   Stress: Not on file   Social Connections: Not on file   Intimate Partner Violence: Not on file   Housing Stability: Not on file       Current Outpatient Medications   Medication Sig Dispense Refill    ketoconazole (NIZORAL) 2 % Cream       betamethasone dipropionate 0.05 % Cream Apply 1 Application topically 2 times a day. 45 g 3    folic acid (FOLVITE) 1 MG Tab Take 1 mg by mouth every day.      losartan (COZAAR) 50 MG Tab Take 1 Tablet by mouth every day. 90 Tablet 3     "amLODIPine (NORVASC) 10 MG Tab Take 1 Tablet by mouth every day. 90 Tablet 3    rosuvastatin (CRESTOR) 10 MG Tab TAKE ONE TABLET BY MOUTH EVERY EVENING 90 Tablet 3    ezetimibe (ZETIA) 10 MG Tab Take 1 Tablet by mouth every day. 90 Tablet 3    beclomethasone (QVAR) 40 MCG/ACT inhaler Inhale 1 Puff by mouth 2 Times a Day. 3 Inhaler 3    albuterol 108 (90 Base) MCG/ACT Aero Soln inhalation aerosol Inhale 2 Puffs by mouth every 6 hours as needed for Shortness of Breath. 8.5 g 12    Cholecalciferol (VITAMIN D) 2000 UNITS Cap Take 1 Cap by mouth every day.      Cyanocobalamin (B-12) 1000 MCG TBCR Take 1 Each by mouth every day.       No current facility-administered medications for this visit.         Review Of Systems  As documented in HPI above  PHYSICAL EXAMINATION:    /60 (BP Location: Right arm, Patient Position: Sitting, BP Cuff Size: Adult)   Pulse 68   Temp 36.7 °C (98.1 °F) (Temporal)   Resp 16   Ht 1.753 m (5' 9\")   Wt 72.6 kg (160 lb)   SpO2 98%   BMI 23.63 kg/m²   Gen.: Well-developed, well-nourished, no apparent distress, pleasant and cooperative with the examination  HEENT: Normocephalic/atraumatic,   Neck: No JVD or bruits, no adenopathy  Cor: Regular rate and rhythm without murmur gallop or rub  Lungs: Clear to auscultation with equal breath sounds bilaterally. No wheezes, rhonchi.  Abdomen: Soft nontender without hepatosplenomegaly or masses appreciated, normoactive bowel sounds  Extremities: No cyanosis, clubbing or edema        ASSESSMENT/Plan:    1. Prediabetes  Resolved, A1c 5.6.  POCT  A1C      2. Thrombocytopenia (HCC)  Chronic, patient to follow-up with hematologist for further evaluation and management.  Referral to Hematology Oncology          Please note that this dictation was created using voice recognition software. I have made every reasonable attempt to correct obvious errors but there may be errors of grammar and content that I may have overlooked prior to finalization of " this note.

## 2022-09-03 ENCOUNTER — HOSPITAL ENCOUNTER (OUTPATIENT)
Dept: LAB | Facility: MEDICAL CENTER | Age: 72
End: 2022-09-03
Attending: INTERNAL MEDICINE
Payer: MEDICARE

## 2022-09-03 DIAGNOSIS — N18.31 STAGE 3A CHRONIC KIDNEY DISEASE: ICD-10-CM

## 2022-09-03 DIAGNOSIS — D64.9 ANEMIA, UNSPECIFIED TYPE: ICD-10-CM

## 2022-09-03 DIAGNOSIS — R80.9 MICROALBUMINURIA: ICD-10-CM

## 2022-09-03 LAB
ANION GAP SERPL CALC-SCNC: 9 MMOL/L (ref 7–16)
APPEARANCE UR: CLEAR
BACTERIA #/AREA URNS HPF: NEGATIVE /HPF
BILIRUB UR QL STRIP.AUTO: NEGATIVE
BUN SERPL-MCNC: 22 MG/DL (ref 8–22)
CALCIUM SERPL-MCNC: 9 MG/DL (ref 8.5–10.5)
CHLORIDE SERPL-SCNC: 105 MMOL/L (ref 96–112)
CO2 SERPL-SCNC: 24 MMOL/L (ref 20–33)
COLOR UR: YELLOW
CREAT SERPL-MCNC: 1.33 MG/DL (ref 0.5–1.4)
CREAT UR-MCNC: 124.85 MG/DL
EPI CELLS #/AREA URNS HPF: NEGATIVE /HPF
ERYTHROCYTE [DISTWIDTH] IN BLOOD BY AUTOMATED COUNT: 50 FL (ref 35.9–50)
GFR SERPLBLD CREATININE-BSD FMLA CKD-EPI: 56 ML/MIN/1.73 M 2
GLUCOSE SERPL-MCNC: 116 MG/DL (ref 65–99)
GLUCOSE UR STRIP.AUTO-MCNC: NEGATIVE MG/DL
HCT VFR BLD AUTO: 43.2 % (ref 42–52)
HGB BLD-MCNC: 14 G/DL (ref 14–18)
HYALINE CASTS #/AREA URNS LPF: ABNORMAL /LPF
KETONES UR STRIP.AUTO-MCNC: NEGATIVE MG/DL
LEUKOCYTE ESTERASE UR QL STRIP.AUTO: ABNORMAL
MCH RBC QN AUTO: 32.9 PG (ref 27–33)
MCHC RBC AUTO-ENTMCNC: 32.4 G/DL (ref 33.7–35.3)
MCV RBC AUTO: 101.4 FL (ref 81.4–97.8)
MICRO URNS: ABNORMAL
MICROALBUMIN UR-MCNC: <1.2 MG/DL
MICROALBUMIN/CREAT UR: NORMAL MG/G (ref 0–30)
NITRITE UR QL STRIP.AUTO: NEGATIVE
PH UR STRIP.AUTO: 6 [PH] (ref 5–8)
PLATELET # BLD AUTO: 163 K/UL (ref 164–446)
PMV BLD AUTO: 12 FL (ref 9–12.9)
POTASSIUM SERPL-SCNC: 5.2 MMOL/L (ref 3.6–5.5)
PROT UR QL STRIP: NEGATIVE MG/DL
RBC # BLD AUTO: 4.26 M/UL (ref 4.7–6.1)
RBC # URNS HPF: ABNORMAL /HPF
RBC UR QL AUTO: NEGATIVE
SODIUM SERPL-SCNC: 138 MMOL/L (ref 135–145)
SP GR UR STRIP.AUTO: 1.02
UROBILINOGEN UR STRIP.AUTO-MCNC: 0.2 MG/DL
WBC # BLD AUTO: 8.5 K/UL (ref 4.8–10.8)
WBC #/AREA URNS HPF: ABNORMAL /HPF

## 2022-09-03 PROCEDURE — 36415 COLL VENOUS BLD VENIPUNCTURE: CPT

## 2022-09-03 PROCEDURE — 80048 BASIC METABOLIC PNL TOTAL CA: CPT

## 2022-09-03 PROCEDURE — 85027 COMPLETE CBC AUTOMATED: CPT

## 2022-09-03 PROCEDURE — 82043 UR ALBUMIN QUANTITATIVE: CPT

## 2022-09-03 PROCEDURE — 82570 ASSAY OF URINE CREATININE: CPT

## 2022-09-03 PROCEDURE — 81001 URINALYSIS AUTO W/SCOPE: CPT

## 2022-09-07 ENCOUNTER — OFFICE VISIT (OUTPATIENT)
Dept: NEPHROLOGY | Facility: MEDICAL CENTER | Age: 72
End: 2022-09-07
Payer: MEDICARE

## 2022-09-07 VITALS
SYSTOLIC BLOOD PRESSURE: 120 MMHG | OXYGEN SATURATION: 98 % | BODY MASS INDEX: 23.4 KG/M2 | TEMPERATURE: 98.6 F | WEIGHT: 158 LBS | HEIGHT: 69 IN | HEART RATE: 78 BPM | DIASTOLIC BLOOD PRESSURE: 68 MMHG

## 2022-09-07 DIAGNOSIS — D64.9 ANEMIA, UNSPECIFIED TYPE: ICD-10-CM

## 2022-09-07 DIAGNOSIS — N18.31 STAGE 3A CHRONIC KIDNEY DISEASE: ICD-10-CM

## 2022-09-07 DIAGNOSIS — I10 PRIMARY HYPERTENSION: ICD-10-CM

## 2022-09-07 DIAGNOSIS — N20.0 NEPHROLITHIASIS: ICD-10-CM

## 2022-09-07 DIAGNOSIS — R80.9 MICROALBUMINURIA: ICD-10-CM

## 2022-09-07 DIAGNOSIS — E55.9 VITAMIN D DEFICIENCY: ICD-10-CM

## 2022-09-07 PROCEDURE — 99214 OFFICE O/P EST MOD 30 MIN: CPT | Performed by: INTERNAL MEDICINE

## 2022-09-07 ASSESSMENT — ENCOUNTER SYMPTOMS
COUGH: 0
CHILLS: 0
FLANK PAIN: 0
EYES NEGATIVE: 1
PALPITATIONS: 0
SHORTNESS OF BREATH: 0
NAUSEA: 0
HEMOPTYSIS: 0
FEVER: 0
ABDOMINAL PAIN: 0
ORTHOPNEA: 0
WHEEZING: 0
WEIGHT LOSS: 0
SINUS PAIN: 0
VOMITING: 0

## 2022-09-07 ASSESSMENT — FIBROSIS 4 INDEX: FIB4 SCORE: 2.06

## 2022-09-07 NOTE — PROGRESS NOTES
Subjective     Deacon Pulido is a 71 y.o. male who presents with Chronic Kidney Disease            Chronic Kidney Disease  Pertinent negatives include no abdominal pain, chest pain, chills, congestion, coughing, fever, nausea or vomiting.   Deacon is coming today for  of CKD III woth worsening creatinine level  Doing well, no complaints   No dysuria/hematuria flank pain  Long term hx/of HTN  (+) nephrolithiasis -stable  CKD III a  -creat level improved from 1.59 -to 1.33 -baseline  HTN: BP very well controlled    Review of Systems   Constitutional:  Negative for chills, fever, malaise/fatigue and weight loss.   HENT:  Negative for congestion, hearing loss and sinus pain.    Eyes: Negative.    Respiratory:  Negative for cough, hemoptysis, shortness of breath and wheezing.    Cardiovascular:  Negative for chest pain, palpitations, orthopnea and leg swelling.   Gastrointestinal:  Negative for abdominal pain, nausea and vomiting.   Genitourinary:  Negative for dysuria, flank pain, frequency, hematuria and urgency.   Skin: Negative.    All other systems reviewed and are negative.       Past Medical History:   Diagnosis Date    Allergy     Cervical radiculopathy 2/16/2018    Family history of dementia     Family history of hypertension     Family history of stroke     Hypertension     Kidney stones     Multiple actinic keratoses 2/28/2019    NAFL (nonalcoholic fatty liver) 3/30/2018    Dx on CT Chest 2/24/18    SK (seborrheic keratosis) 6/30/2020    Stage 3 chronic kidney disease (HCC) 6/30/2020    Tobacco use 10/22/2013    Type 2 diabetes mellitus without complication, without long-term current use of insulin (HCC) 9/17/2014       Family History   Problem Relation Age of Onset    Asthma Mother     Heart Attack Mother     Hypertension Father     Stroke Father     Dementia Father     Lung Disease Father         heavy smoker    Other Brother         Morbid Obesity    Alcohol/Drug Brother         prescription drug problem  "   Alcohol/Drug Brother         street drugs    Cancer Paternal Uncle         Pancreatic    Colon Cancer Cousin     Cancer Paternal Aunt         colon ca        Social History     Socioeconomic History    Marital status:    Tobacco Use    Smoking status: Every Day     Packs/day: 1.00     Years: 50.00     Pack years: 50.00     Types: Cigarettes    Smokeless tobacco: Never    Tobacco comments:     05/09/17 -- currently 1/2 pack per day   Substance and Sexual Activity    Alcohol use: Not Currently     Alcohol/week: 0.0 oz     Comment: 1x/month, 1 drink per time    Drug use: No    Sexual activity: Yes     Partners: Female     Birth control/protection: Post-Menopausal     Comment: ,  multiple companies         Objective     /68 (BP Location: Right arm, Patient Position: Sitting, BP Cuff Size: Adult)   Pulse 78   Temp 37 °C (98.6 °F) (Temporal)   Ht 1.753 m (5' 9\")   Wt 71.7 kg (158 lb)   SpO2 98%   BMI 23.33 kg/m²      Physical Exam  Vitals reviewed.   Constitutional:       General: He is not in acute distress.     Appearance: Normal appearance. He is well-developed. He is not diaphoretic.   HENT:      Head: Normocephalic and atraumatic.      Nose: Nose normal.      Mouth/Throat:      Mouth: Mucous membranes are moist.      Pharynx: Oropharynx is clear.   Eyes:      General: No scleral icterus.     Extraocular Movements: Extraocular movements intact.      Conjunctiva/sclera: Conjunctivae normal.      Pupils: Pupils are equal, round, and reactive to light.   Cardiovascular:      Rate and Rhythm: Normal rate and regular rhythm.      Pulses: Normal pulses.      Heart sounds: Normal heart sounds.     No friction rub. No gallop.   Pulmonary:      Effort: Pulmonary effort is normal. No respiratory distress.      Breath sounds: Normal breath sounds. No wheezing or rales.   Abdominal:      General: Bowel sounds are normal. There is no distension.      Palpations: Abdomen is soft. There is no mass.     "  Tenderness: There is no abdominal tenderness. There is no right CVA tenderness or left CVA tenderness.   Musculoskeletal:      Cervical back: Normal range of motion and neck supple.      Right lower leg: No edema.      Left lower leg: No edema.   Skin:     General: Skin is warm.      Coloration: Skin is not pale.      Findings: No erythema or rash.   Neurological:      General: No focal deficit present.      Mental Status: He is alert and oriented to person, place, and time.      Cranial Nerves: No cranial nerve deficit.      Coordination: Coordination normal.   Psychiatric:         Mood and Affect: Mood normal.         Behavior: Behavior normal.         Thought Content: Thought content normal.         Judgment: Judgment normal.                  Laboratory results reviewed: d/w Pt  Lab Results   Component Value Date/Time    CREATININE 1.33 09/03/2022 09:00 AM    POTASSIUM 5.2 09/03/2022 09:00 AM         Assessment & Plan        1. Stage 3a chronic kidney disease (HCC)       Creat level improved -back to baseline-to monitor       Keep well hydrated    2. Primary hypertension      BP well controlled now -to monitor      3. Vitamin D deficiency      Well controlled --to monitor      4. Microalbuminuria      negative      5. Nephrolithiasis      left kidney stone - non obstructing      asymptomatoc    6. Anemia, unspecified type      Hb level stable -WNL            Recs:  Continue current treatment  Keep well hydrated  Monitor BP  Avoid NSAID's  Low salt diet  F/u in 6 months

## 2022-11-07 DIAGNOSIS — I10 ESSENTIAL HYPERTENSION: ICD-10-CM

## 2022-11-07 RX ORDER — AMLODIPINE BESYLATE 10 MG/1
10 TABLET ORAL
Qty: 90 TABLET | Refills: 3 | Status: SHIPPED | OUTPATIENT
Start: 2022-11-07 | End: 2023-10-31 | Stop reason: SDUPTHER

## 2022-11-07 NOTE — TELEPHONE ENCOUNTER
Received request via: Pharmacy    Was the patient seen in the last year in this department? Yes  8/18/22  Does the patient have an active prescription (recently filled or refills available) for medication(s) requested? No

## 2022-11-08 ENCOUNTER — PATIENT MESSAGE (OUTPATIENT)
Dept: HEALTH INFORMATION MANAGEMENT | Facility: OTHER | Age: 72
End: 2022-11-08

## 2022-11-09 NOTE — PROGRESS NOTES
11/15/22    Subjective    Chief Complaint:  Thrombocytopenia    HPI:  72 male referred for consultation by Dr. Rizvi because of persistently low platelet counts. The reduction is mild. He had a CT abdomen a year ago which showed a normal size spleen. Drank heavily in past, not for a number of years.    ROS:    Constitutional: No weight loss  Skin: No rash or jaundice  HENT: No change in eyesight or hearing  Cardiovascular:No chest pain or arrythmia  Respiratory:No cough or SOB  GI:No nausea, vomiting, diarrhea, constipation  :No dysuria or frequency  Musculoskeletal:No bone or joint pain  Neuro:No sx's of neuropathy  Psych: No complaints    PMH:      Allergies   Allergen Reactions    Penicillamine Unspecified     high    Cephalexin     Cephalosporins     Peanut-Derived        Past Medical History:   Diagnosis Date    Allergy     Cervical radiculopathy 2/16/2018    Family history of dementia     Family history of hypertension     Family history of stroke     Hypertension     Kidney stones     Multiple actinic keratoses 2/28/2019    NAFL (nonalcoholic fatty liver) 3/30/2018    Dx on CT Chest 2/24/18    SK (seborrheic keratosis) 6/30/2020    Stage 3 chronic kidney disease (HCC) 6/30/2020    Tobacco use 10/22/2013    Type 2 diabetes mellitus without complication, without long-term current use of insulin (HCC) 9/17/2014        Past Surgical History:   Procedure Laterality Date    ORIF, KNEE Right     X 3    OTHER ORTHOPEDIC SURGERY Right     chronic dislocating shoulder    TONSILLECTOMY          Medications:    Current Outpatient Medications on File Prior to Encounter   Medication Sig Dispense Refill    losartan (COZAAR) 50 MG Tab Take 1 Tablet by mouth every day. 90 Tablet 3    ezetimibe (ZETIA) 10 MG Tab Take 1 Tablet by mouth every day. 90 Tablet 3    rosuvastatin (CRESTOR) 10 MG Tab TAKE ONE TABLET BY MOUTH EVERY EVENING 90 Tablet 3    amLODIPine (NORVASC) 10 MG Tab Take 1 Tablet by mouth every day. 90 Tablet 3     "ketoconazole (NIZORAL) 2 % Cream       betamethasone dipropionate 0.05 % Cream Apply 1 Application topically 2 times a day. 45 g 3    folic acid (FOLVITE) 1 MG Tab Take 1 Tablet by mouth every day.      beclomethasone (QVAR) 40 MCG/ACT inhaler Inhale 1 Puff by mouth 2 Times a Day. 3 Inhaler 3    albuterol 108 (90 Base) MCG/ACT Aero Soln inhalation aerosol Inhale 2 Puffs by mouth every 6 hours as needed for Shortness of Breath. 8.5 g 12    Cholecalciferol (VITAMIN D) 2000 UNITS Cap Take 1 Capsule by mouth every day.      Cyanocobalamin (B-12) 1000 MCG TBCR Take 1 Each by mouth every day.       No current facility-administered medications on file prior to encounter.       Social History     Tobacco Use    Smoking status: Every Day     Packs/day: 1.00     Years: 50.00     Pack years: 50.00     Types: Cigarettes    Smokeless tobacco: Never    Tobacco comments:     05/09/17 -- currently 1/2 pack per day   Substance Use Topics    Alcohol use: Not Currently     Alcohol/week: 0.0 oz     Comment: 1x/month, 1 drink per time        Family History   Problem Relation Age of Onset    Asthma Mother     Heart Attack Mother     Hypertension Father     Stroke Father     Dementia Father     Lung Disease Father         heavy smoker    Other Brother         Morbid Obesity    Alcohol/Drug Brother         prescription drug problem    Alcohol/Drug Brother         street drugs    Cancer Paternal Uncle         Pancreatic    Colon Cancer Cousin     Cancer Paternal Aunt         colon ca         Objective    Vitals:    BP (!) 140/72 (BP Location: Left arm, Patient Position: Sitting, BP Cuff Size: Adult)   Pulse 78   Temp 36.1 °C (97 °F) (Temporal)   Resp 18   Ht 1.753 m (5' 9.02\")   Wt 73.6 kg (162 lb 5.9 oz)   SpO2 97%   BMI 23.97 kg/m²     Physical Exam:    Appears well-developed and well-nourished. No distress.    Head -  Normocephalic .   Eyes - Pupils are equal. Conjunctivae normal. No scleral icterus.   Ears - normal hearing  Neck " - Neck supple. No thyromegaly  Cardiovascular - Normal rate, regular rhythm, normal heart sounds and intact distal pulses. No  gallop, murmur or rub  Pulmonary - Normal breath sounds.  No wheeze, rales or rhonchi  Abdominal -Soft. No distension, tenderness, organomegaly or mass  Extremities-  No edema or tenderness.    Nodes - No submental, submandibular, preauricular, cervical, axillary or inguinal adenopathy.    Neurological -   Alert and oriented.  Skin - Skin is warm and dry. No rash noted. Not diaphoretic. No erythema. No pallor. No jaundice   Psychiatric -  Normal mood and affect.  Labs:     Latest Reference Range & Units 10/20/15 09:00 11/01/16 10:15 02/21/18 07:06 02/16/22 11:02 08/13/22 10:11 09/03/22 09:00   WBC 4.8 - 10.8 K/uL 9.3 9.8 8.3 9.2 8.9 8.5   RBC 4.70 - 6.10 M/uL 4.84 4.78 4.77 4.82 4.38 (L) 4.26 (L)   Hemoglobin 14.0 - 18.0 g/dL 15.5 15.4 14.9 15.6 14.1 14.0   Hematocrit 42.0 - 52.0 % 47.8 47.3 46.3 47.0 42.7 43.2   MCV 81.4 - 97.8 fL 98.8 (H) 99.0 (H) 97.1 97.5 97.5 101.4 (H)   MCH 27.0 - 33.0 pg 32.0 32.2 31.2 32.4 32.2 32.9   MCHC 33.7 - 35.3 g/dL 32.4 (L) 32.6 (L) 32.2 (L) 33.2 (L) 33.0 (L) 32.4 (L)   RDW 35.9 - 50.0 fL 45.9 46.4 46.5 46.8 48.2 50.0   Platelet Count 164 - 446 K/uL 152 (L) 160 (L) 164 138 (L) 153 (L) 163 (L)         Assessment  Just a tad below normal  Imp:    Visit Diagnosis:    1. Thrombocytopenia (HCC)  VITAMIN B12    FOLATE    MILLER REFLEXIVE PROFILE    CBC WITH DIFFERENTIAL          Plan:  Above lab and then MyChart message me  If normal have primary monitor and refer back if something changes    Gonsalo Sahu M.D.

## 2022-11-11 DIAGNOSIS — E78.2 MIXED HYPERLIPIDEMIA WITH APOLIPOPROTEIN E4 VARIANT: ICD-10-CM

## 2022-11-11 DIAGNOSIS — I77.6 INFLAMMATION OF ARTERIES (HCC): ICD-10-CM

## 2022-11-11 RX ORDER — LOSARTAN POTASSIUM 50 MG/1
50 TABLET ORAL DAILY
Qty: 90 TABLET | Refills: 3 | Status: SHIPPED | OUTPATIENT
Start: 2022-11-11 | End: 2023-11-02 | Stop reason: SDUPTHER

## 2022-11-11 RX ORDER — EZETIMIBE 10 MG/1
10 TABLET ORAL
Qty: 90 TABLET | Refills: 3 | Status: SHIPPED | OUTPATIENT
Start: 2022-11-11 | End: 2023-11-02 | Stop reason: SDUPTHER

## 2022-11-11 RX ORDER — ROSUVASTATIN CALCIUM 10 MG/1
TABLET, COATED ORAL
Qty: 90 TABLET | Refills: 3 | Status: SHIPPED
Start: 2022-11-11 | End: 2023-03-31

## 2022-11-11 NOTE — TELEPHONE ENCOUNTER
Received request via: Pharmacy    Was the patient seen in the last year in this department? Yes 8/18/22    Does the patient have an active prescription (recently filled or refills available) for medication(s) requested? No    Does the patient have retirement Plus and need 100 day supply (blood pressure, diabetes and cholesterol meds only)? Patient does not have SCP

## 2022-11-15 ENCOUNTER — HOSPITAL ENCOUNTER (OUTPATIENT)
Dept: HEMATOLOGY ONCOLOGY | Facility: MEDICAL CENTER | Age: 72
End: 2022-11-15
Attending: INTERNAL MEDICINE
Payer: MEDICARE

## 2022-11-15 ENCOUNTER — HOSPITAL ENCOUNTER (OUTPATIENT)
Dept: LAB | Facility: MEDICAL CENTER | Age: 72
End: 2022-11-15
Attending: INTERNAL MEDICINE
Payer: MEDICARE

## 2022-11-15 VITALS
OXYGEN SATURATION: 97 % | RESPIRATION RATE: 18 BRPM | HEART RATE: 78 BPM | HEIGHT: 69 IN | WEIGHT: 162.37 LBS | SYSTOLIC BLOOD PRESSURE: 140 MMHG | BODY MASS INDEX: 24.05 KG/M2 | DIASTOLIC BLOOD PRESSURE: 72 MMHG | TEMPERATURE: 97 F

## 2022-11-15 DIAGNOSIS — D69.6 THROMBOCYTOPENIA (HCC): ICD-10-CM

## 2022-11-15 LAB
BASOPHILS # BLD AUTO: 1.1 % (ref 0–1.8)
BASOPHILS # BLD: 0.09 K/UL (ref 0–0.12)
EOSINOPHIL # BLD AUTO: 0.4 K/UL (ref 0–0.51)
EOSINOPHIL NFR BLD: 5 % (ref 0–6.9)
ERYTHROCYTE [DISTWIDTH] IN BLOOD BY AUTOMATED COUNT: 45.7 FL (ref 35.9–50)
FOLATE SERPL-MCNC: >40 NG/ML
HCT VFR BLD AUTO: 45.3 % (ref 42–52)
HGB BLD-MCNC: 14.8 G/DL (ref 14–18)
IMM GRANULOCYTES # BLD AUTO: 0.03 K/UL (ref 0–0.11)
IMM GRANULOCYTES NFR BLD AUTO: 0.4 % (ref 0–0.9)
LYMPHOCYTES # BLD AUTO: 1.61 K/UL (ref 1–4.8)
LYMPHOCYTES NFR BLD: 20 % (ref 22–41)
MCH RBC QN AUTO: 32.2 PG (ref 27–33)
MCHC RBC AUTO-ENTMCNC: 32.7 G/DL (ref 33.7–35.3)
MCV RBC AUTO: 98.5 FL (ref 81.4–97.8)
MONOCYTES # BLD AUTO: 0.59 K/UL (ref 0–0.85)
MONOCYTES NFR BLD AUTO: 7.3 % (ref 0–13.4)
NEUTROPHILS # BLD AUTO: 5.35 K/UL (ref 1.82–7.42)
NEUTROPHILS NFR BLD: 66.2 % (ref 44–72)
NRBC # BLD AUTO: 0 K/UL
NRBC BLD-RTO: 0 /100 WBC
PLATELET # BLD AUTO: 169 K/UL (ref 164–446)
PMV BLD AUTO: 11.9 FL (ref 9–12.9)
RBC # BLD AUTO: 4.6 M/UL (ref 4.7–6.1)
VIT B12 SERPL-MCNC: 1932 PG/ML (ref 211–911)
WBC # BLD AUTO: 8.1 K/UL (ref 4.8–10.8)

## 2022-11-15 PROCEDURE — 82607 VITAMIN B-12: CPT

## 2022-11-15 PROCEDURE — 36415 COLL VENOUS BLD VENIPUNCTURE: CPT

## 2022-11-15 PROCEDURE — 85025 COMPLETE CBC W/AUTO DIFF WBC: CPT

## 2022-11-15 PROCEDURE — 86038 ANTINUCLEAR ANTIBODIES: CPT

## 2022-11-15 PROCEDURE — 99212 OFFICE O/P EST SF 10 MIN: CPT | Performed by: INTERNAL MEDICINE

## 2022-11-15 PROCEDURE — 99203 OFFICE O/P NEW LOW 30 MIN: CPT | Performed by: INTERNAL MEDICINE

## 2022-11-15 PROCEDURE — 82746 ASSAY OF FOLIC ACID SERUM: CPT

## 2022-11-15 ASSESSMENT — FIBROSIS 4 INDEX: FIB4 SCORE: 2.06

## 2022-11-15 ASSESSMENT — PAIN SCALES - GENERAL: PAINLEVEL: NO PAIN

## 2022-11-17 LAB — NUCLEAR IGG SER QL IA: NORMAL

## 2022-12-27 ENCOUNTER — OFFICE VISIT (OUTPATIENT)
Dept: MEDICAL GROUP | Facility: LAB | Age: 72
End: 2022-12-27
Payer: MEDICARE

## 2022-12-27 VITALS
TEMPERATURE: 97.3 F | DIASTOLIC BLOOD PRESSURE: 56 MMHG | RESPIRATION RATE: 12 BRPM | BODY MASS INDEX: 22.96 KG/M2 | HEART RATE: 80 BPM | HEIGHT: 69 IN | WEIGHT: 155 LBS | OXYGEN SATURATION: 96 % | SYSTOLIC BLOOD PRESSURE: 122 MMHG

## 2022-12-27 DIAGNOSIS — R09.81 NASAL CONGESTION: ICD-10-CM

## 2022-12-27 DIAGNOSIS — R05.9 COUGH, UNSPECIFIED TYPE: ICD-10-CM

## 2022-12-27 PROCEDURE — 99214 OFFICE O/P EST MOD 30 MIN: CPT | Performed by: FAMILY MEDICINE

## 2022-12-27 RX ORDER — METHYLPREDNISOLONE 4 MG/1
TABLET ORAL
Qty: 21 TABLET | Refills: 0 | Status: SHIPPED | OUTPATIENT
Start: 2022-12-27

## 2022-12-27 RX ORDER — FLUTICASONE PROPIONATE 50 MCG
1 SPRAY, SUSPENSION (ML) NASAL DAILY
Qty: 16 G | Refills: 1 | Status: SHIPPED | OUTPATIENT
Start: 2022-12-27

## 2022-12-27 RX ORDER — DOXYCYCLINE 100 MG/1
100 CAPSULE ORAL 2 TIMES DAILY
Qty: 14 CAPSULE | Refills: 0 | Status: SHIPPED
Start: 2022-12-27 | End: 2023-10-11

## 2022-12-27 ASSESSMENT — FIBROSIS 4 INDEX: FIB4 SCORE: 1.99

## 2022-12-27 NOTE — PROGRESS NOTES
Chief Complaint:   Chief Complaint   Patient presents with    Cough     Nasal drainage, green mucous, positive covid 12/01/2022       HPI:Established patient  Deacon Pulido is a 72 y.o. male who presents for evaluation of the following:    Cough, unspecified type/. Nasal congestion    New concern  Patient was diagnosed with COVID around 3 weeks ago,  Did all the supportive care rest and conservative management for COVID.  Cough is not resolving and its associated with mild shortness of breath and productive sputum that is yellowish-green in color.  Denies chest pain or palpitations.  He said initially the cough was in the morning and at night but now it is all day long.  Denies fever now or GI symptoms.    Patient is a smoker and still continues to smoke.        Past medical history, family history, social history and medications reviewed and updated in the record.  Today  Current medications, problem list and allergies reviewed in [facility] today  Health maintenance topics are reviewed and updated.    Patient Active Problem List    Diagnosis Date Noted    Encounter to establish care with new doctor 10/28/2021    Thrombocytopenia (HCC) 08/18/2022    Lung nodules 11/30/2021    Right kidney mass 11/19/2021    Weight loss 11/19/2021    Stage 3b chronic kidney disease (HCC) 11/02/2021    Prediabetes 10/28/2021    Stage 3 chronic kidney disease (HCC) 06/30/2020    SK (seborrheic keratosis) 06/30/2020    Multiple actinic keratoses 02/28/2019    NAFL (nonalcoholic fatty liver) 03/30/2018    Cervical radiculopathy 02/16/2018    Encounter for screening for lung cancer 05/09/2017    Coronary atherosclerosis due to calcified coronary lesion 12/14/2016    Mixed hyperlipidemia with apolipoprotein E4 variant 09/17/2014    Metabolic syndrome 09/17/2014    Atherosclerosis of both carotid arteries 08/11/2014    Abnormal chest CT 08/11/2014    Screening for osteoporosis 08/07/2014    Primary osteoarthritis of right knee 10/22/2013     Atherosclerosis of aorta (HCC) 10/22/2013    HTN (hypertension) 10/22/2013    Tobacco use 10/22/2013    History of kidney stones      Family History   Problem Relation Age of Onset    Asthma Mother     Heart Attack Mother     Hypertension Father     Stroke Father     Dementia Father     Lung Disease Father         heavy smoker    Other Brother         Morbid Obesity    Alcohol/Drug Brother         prescription drug problem    Alcohol/Drug Brother         street drugs    Cancer Paternal Uncle         Pancreatic    Colon Cancer Cousin     Cancer Paternal Aunt         colon ca      Social History     Socioeconomic History    Marital status:      Spouse name: Not on file    Number of children: Not on file    Years of education: Not on file    Highest education level: Not on file   Occupational History    Not on file   Tobacco Use    Smoking status: Every Day     Packs/day: 1.00     Years: 50.00     Pack years: 50.00     Types: Cigarettes    Smokeless tobacco: Never    Tobacco comments:     05/09/17 -- currently 1/2 pack per day   Vaping Use    Vaping Use: Not on file   Substance and Sexual Activity    Alcohol use: Not Currently     Alcohol/week: 0.0 oz     Comment: 1x/month, 1 drink per time    Drug use: No    Sexual activity: Yes     Partners: Female     Birth control/protection: Post-Menopausal     Comment: ,  multiple companies   Other Topics Concern    Not on file   Social History Narrative    Not on file     Social Determinants of Health     Financial Resource Strain: Not on file   Food Insecurity: Not on file   Transportation Needs: Not on file   Physical Activity: Not on file   Stress: Not on file   Social Connections: Not on file   Intimate Partner Violence: Not on file   Housing Stability: Not on file       Current Outpatient Medications   Medication Sig Dispense Refill    doxycycline (MONODOX) 100 MG capsule Take 1 Capsule by mouth 2 times a day. 14 Capsule 0    methylPREDNISolone (MEDROL  "DOSEPAK) 4 MG Tablet Therapy Pack As directed on the packaging label. 21 Tablet 0    fluticasone (FLONASE) 50 MCG/ACT nasal spray Administer 1 Spray into affected nostril(S) every day. 16 g 1    losartan (COZAAR) 50 MG Tab Take 1 Tablet by mouth every day. 90 Tablet 3    ezetimibe (ZETIA) 10 MG Tab Take 1 Tablet by mouth every day. 90 Tablet 3    rosuvastatin (CRESTOR) 10 MG Tab TAKE ONE TABLET BY MOUTH EVERY EVENING 90 Tablet 3    amLODIPine (NORVASC) 10 MG Tab Take 1 Tablet by mouth every day. 90 Tablet 3    ketoconazole (NIZORAL) 2 % Cream       betamethasone dipropionate 0.05 % Cream Apply 1 Application topically 2 times a day. 45 g 3    folic acid (FOLVITE) 1 MG Tab Take 1 Tablet by mouth every day.      beclomethasone (QVAR) 40 MCG/ACT inhaler Inhale 1 Puff by mouth 2 Times a Day. 3 Inhaler 3    albuterol 108 (90 Base) MCG/ACT Aero Soln inhalation aerosol Inhale 2 Puffs by mouth every 6 hours as needed for Shortness of Breath. 8.5 g 12    Cholecalciferol (VITAMIN D) 2000 UNITS Cap Take 1 Capsule by mouth every day.      Cyanocobalamin (B-12) 1000 MCG TBCR Take 1 Each by mouth every day.       No current facility-administered medications for this visit.        Review Of Systems  As documented in HPI above  PHYSICAL EXAMINATION:    /56 (BP Location: Left arm, Patient Position: Sitting, BP Cuff Size: Adult)   Pulse 80   Temp 36.3 °C (97.3 °F)   Resp 12   Ht 1.753 m (5' 9.02\")   Wt 70.3 kg (155 lb)   SpO2 96%   BMI 22.88 kg/m²   Gen.: Well-developed, well-nourished, no apparent distress, pleasant and cooperative with the examination  HEENT: Normocephalic/atraumatic, sinuses nontender with palpation, TMs clear, nares patent with pink mucosa and clear rhinorrhea, oropharynx clear  Neck: No JVD or bruits, no adenopathy  Cor: Regular rate and rhythm without murmur gallop or rub  Lungs: Clear to auscultation, noted bilateral rhonchi and wheezing.  Abdomen: Soft nontender without hepatosplenomegaly or " masses appreciated, normoactive bowel sounds  Extremities: No cyanosis, clubbing or edema       ASSESSMENT/Plan:  1. Cough, unspecified type  New concern, status post COVID infection with unresolved cough.  We will treat patient with Medrol Dosepak and antibiotics for possible secondary pneumonia, if symptoms did not resolve patient to come back we will order an x-ray for further evaluation.  Advised to continue aggressive hydration and symptomatic treatment.    doxycycline (MONODOX) 100 MG capsule    methylPREDNISolone (MEDROL DOSEPAK) 4 MG Tablet Therapy Pack      2. Nasal congestion  fluticasone (FLONASE) 50 MCG/ACT nasal spray         Please note that this dictation was created using voice recognition software. I have made every reasonable attempt to correct obvious errors but there may be errors of grammar and content that I may have overlooked prior to finalization of this note.

## 2023-02-17 ENCOUNTER — OFFICE VISIT (OUTPATIENT)
Dept: MEDICAL GROUP | Facility: LAB | Age: 73
End: 2023-02-17
Payer: MEDICARE

## 2023-02-17 VITALS
WEIGHT: 162 LBS | BODY MASS INDEX: 23.99 KG/M2 | DIASTOLIC BLOOD PRESSURE: 66 MMHG | SYSTOLIC BLOOD PRESSURE: 124 MMHG | HEART RATE: 66 BPM | TEMPERATURE: 97 F | HEIGHT: 69 IN | OXYGEN SATURATION: 98 % | RESPIRATION RATE: 16 BRPM

## 2023-02-17 DIAGNOSIS — I70.0 ATHEROSCLEROSIS OF AORTA (HCC): ICD-10-CM

## 2023-02-17 DIAGNOSIS — R21 SKIN RASH: ICD-10-CM

## 2023-02-17 PROCEDURE — 99214 OFFICE O/P EST MOD 30 MIN: CPT | Performed by: FAMILY MEDICINE

## 2023-02-17 RX ORDER — BETAMETHASONE DIPROPIONATE 0.05 %
1 OINTMENT (GRAM) TOPICAL 2 TIMES DAILY
Qty: 45 G | Refills: 0 | Status: SHIPPED | OUTPATIENT
Start: 2023-02-17

## 2023-02-17 ASSESSMENT — FIBROSIS 4 INDEX: FIB4 SCORE: 1.99

## 2023-02-17 NOTE — PROGRESS NOTES
Chief Complaint:   Chief Complaint   Patient presents with    Skin Lesion     Left lower leg       HPI: Established patient  Deacon Pulido is a 72 y.o. male who presents for follow-up, discussed the following today:      1. Atherosclerosis of aorta   Chronic, stable asymptomatic.  Finding on imaging study.  Patient on statins.  Taking medication as directed blood pressure is well controlled    2. Skin rash    New concern,  Patient said he started to notice these 2 lesions that are very itchy on his left leg.  He has been using lotions and over-the-counter moisturization but its not resolved.  Lesions are very itchy and red.  Patient has chronic history of allergies and eczema.  Noticed the rash around couple of months ago.    Past medical history, family history, social history and medications reviewed and updated in the record.  Today  Current medications, problem list and allergies reviewed in [facility] today  Health maintenance topics are reviewed and updated.    Patient Active Problem List    Diagnosis Date Noted    Encounter to establish care with new doctor 10/28/2021    Thrombocytopenia (HCC) 08/18/2022    Lung nodules 11/30/2021    Right kidney mass 11/19/2021    Weight loss 11/19/2021    Stage 3b chronic kidney disease (HCC) 11/02/2021    Prediabetes 10/28/2021    Stage 3 chronic kidney disease (HCC) 06/30/2020    SK (seborrheic keratosis) 06/30/2020    Multiple actinic keratoses 02/28/2019    NAFL (nonalcoholic fatty liver) 03/30/2018    Cervical radiculopathy 02/16/2018    Encounter for screening for lung cancer 05/09/2017    Coronary atherosclerosis due to calcified coronary lesion 12/14/2016    Mixed hyperlipidemia with apolipoprotein E4 variant 09/17/2014    Metabolic syndrome 09/17/2014    Atherosclerosis of both carotid arteries 08/11/2014    Abnormal chest CT 08/11/2014    Screening for osteoporosis 08/07/2014    Primary osteoarthritis of right knee 10/22/2013    Atherosclerosis of aorta (HCC)  10/22/2013    HTN (hypertension) 10/22/2013    Tobacco use 10/22/2013    History of kidney stones      Family History   Problem Relation Age of Onset    Asthma Mother     Heart Attack Mother     Hypertension Father     Stroke Father     Dementia Father     Lung Disease Father         heavy smoker    Other Brother         Morbid Obesity    Alcohol/Drug Brother         prescription drug problem    Alcohol/Drug Brother         street drugs    Cancer Paternal Uncle         Pancreatic    Colon Cancer Cousin     Cancer Paternal Aunt         colon ca      Social History     Socioeconomic History    Marital status:      Spouse name: Not on file    Number of children: Not on file    Years of education: Not on file    Highest education level: Not on file   Occupational History    Not on file   Tobacco Use    Smoking status: Every Day     Packs/day: 1.00     Years: 50.00     Pack years: 50.00     Types: Cigarettes    Smokeless tobacco: Never    Tobacco comments:     05/09/17 -- currently 1/2 pack per day   Vaping Use    Vaping Use: Not on file   Substance and Sexual Activity    Alcohol use: Not Currently     Alcohol/week: 0.0 oz     Comment: 1x/month, 1 drink per time    Drug use: No    Sexual activity: Yes     Partners: Female     Birth control/protection: Post-Menopausal     Comment: ,  multiple companies   Other Topics Concern    Not on file   Social History Narrative    Not on file     Social Determinants of Health     Financial Resource Strain: Not on file   Food Insecurity: Not on file   Transportation Needs: Not on file   Physical Activity: Not on file   Stress: Not on file   Social Connections: Not on file   Intimate Partner Violence: Not on file   Housing Stability: Not on file       Current Outpatient Medications   Medication Sig Dispense Refill    betamethasone dipropionate (DIPROLENE) 0.05 % Ointment Apply 1 Application topically 2 times a day. 45 g 0    doxycycline (MONODOX) 100 MG capsule Take 1  "Capsule by mouth 2 times a day. 14 Capsule 0    methylPREDNISolone (MEDROL DOSEPAK) 4 MG Tablet Therapy Pack As directed on the packaging label. 21 Tablet 0    fluticasone (FLONASE) 50 MCG/ACT nasal spray Administer 1 Spray into affected nostril(S) every day. 16 g 1    losartan (COZAAR) 50 MG Tab Take 1 Tablet by mouth every day. 90 Tablet 3    ezetimibe (ZETIA) 10 MG Tab Take 1 Tablet by mouth every day. 90 Tablet 3    rosuvastatin (CRESTOR) 10 MG Tab TAKE ONE TABLET BY MOUTH EVERY EVENING 90 Tablet 3    amLODIPine (NORVASC) 10 MG Tab Take 1 Tablet by mouth every day. 90 Tablet 3    ketoconazole (NIZORAL) 2 % Cream       betamethasone dipropionate 0.05 % Cream Apply 1 Application topically 2 times a day. 45 g 3    folic acid (FOLVITE) 1 MG Tab Take 1 Tablet by mouth every day.      beclomethasone (QVAR) 40 MCG/ACT inhaler Inhale 1 Puff by mouth 2 Times a Day. 3 Inhaler 3    albuterol 108 (90 Base) MCG/ACT Aero Soln inhalation aerosol Inhale 2 Puffs by mouth every 6 hours as needed for Shortness of Breath. 8.5 g 12    Cholecalciferol (VITAMIN D) 2000 UNITS Cap Take 1 Capsule by mouth every day.      Cyanocobalamin (B-12) 1000 MCG TBCR Take 1 Each by mouth every day.       No current facility-administered medications for this visit.         Review Of Systems  As documented in HPI above  PHYSICAL EXAMINATION:    /66 (BP Location: Left arm, Patient Position: Sitting, BP Cuff Size: Adult)   Pulse 66   Temp 36.1 °C (97 °F) (Temporal)   Resp 16   Ht 1.753 m (5' 9.02\")   Wt 73.5 kg (162 lb)   SpO2 98%   BMI 23.91 kg/m²   Gen.: Well-developed, well-nourished, no apparent distress, pleasant and cooperative with the examination  HEENT: Normocephalic/atraumatic, sinuses nontender with palpation, TMs clear, nares patent with pink mucosa and clear rhinorrhea, oropharynx clear  Neck: No JVD or bruits, no adenopathy  Cor: Regular rate and rhythm without murmur gallop or rub  Lungs: Clear to auscultation with equal " breath sounds bilaterally. No wheezes, rhonchi.  Abdomen: Soft nontender without hepatosplenomegaly or masses appreciated, normoactive bowel sounds  Extremities: No cyanosis, clubbing or edema  Skin: Annular circular lesions noted on the leg red in color erythematous.  Mildly raised.  No central fading with area of pruritus  1 similar lesion noted also in the abdominal area  ASSESSMENT/Plan:  1. Atherosclerosis of aorta (HCC)  Chronic stable asymptomatic continue statins and will monitor blood pressure      2. Skin rash  New concern, differential diagnosis includes psoriasis.  Advised to use a steroid cream, follow-up with me in 2 weeks to see if it improved.  Avoid hot showers and hot water moisturization of the skin discussed.  Follow-up with dermatology for further evaluation  betamethasone dipropionate (DIPROLENE) 0.05 % Ointment    Referral to Dermatology        Please note that this dictation was created using voice recognition software. I have worked with consultants from the vendor as well as technical experts from [employer] to optimize the interface. I have made every reasonable attempt to correct obvious errors, but I expect that there are errors of grammar and possibly content that I did not discover before finalizing the note.

## 2023-03-03 ENCOUNTER — OFFICE VISIT (OUTPATIENT)
Dept: MEDICAL GROUP | Facility: LAB | Age: 73
End: 2023-03-03
Payer: MEDICARE

## 2023-03-03 VITALS
HEART RATE: 68 BPM | WEIGHT: 160 LBS | BODY MASS INDEX: 23.7 KG/M2 | DIASTOLIC BLOOD PRESSURE: 60 MMHG | TEMPERATURE: 97.4 F | SYSTOLIC BLOOD PRESSURE: 120 MMHG | HEIGHT: 69 IN | OXYGEN SATURATION: 99 % | RESPIRATION RATE: 16 BRPM

## 2023-03-03 DIAGNOSIS — N18.31 STAGE 3A CHRONIC KIDNEY DISEASE: ICD-10-CM

## 2023-03-03 DIAGNOSIS — R21 RASH AND NONSPECIFIC SKIN ERUPTION: ICD-10-CM

## 2023-03-03 PROCEDURE — 99213 OFFICE O/P EST LOW 20 MIN: CPT | Performed by: FAMILY MEDICINE

## 2023-03-03 RX ORDER — CLOTRIMAZOLE AND BETAMETHASONE DIPROPIONATE 10; .64 MG/G; MG/G
1 CREAM TOPICAL 2 TIMES DAILY
Qty: 45 G | Refills: 0 | Status: SHIPPED | OUTPATIENT
Start: 2023-03-03

## 2023-03-03 ASSESSMENT — FIBROSIS 4 INDEX: FIB4 SCORE: 1.99

## 2023-03-03 NOTE — PROGRESS NOTES
Chief Complaint:   Chief Complaint   Patient presents with    Rash     followup       HPI:Established patient follow-up on rash  Deacon Pulido is a 72 y.o. male who presents for     1. Stage 3a chronic kidney disease   Chronic stable asymptomatic, stable GFR    2. Rash and nonspecific skin eruption    New rash, patient already established with dermatology, as per history he was told it was psoriasis.  He continues to use the steroid cream.  Does not see a lot of improvement at this time.  No itching reported.  I have no records from dermatology to review at this time will call for records      Past medical history, family history, social history and medications reviewed and updated in the record.  Today  Current medications, problem list and allergies reviewed in [facility] today  Health maintenance topics are reviewed and updated.    Patient Active Problem List    Diagnosis Date Noted    Encounter to establish care with new doctor 10/28/2021    Thrombocytopenia (HCC) 08/18/2022    Lung nodules 11/30/2021    Right kidney mass 11/19/2021    Weight loss 11/19/2021    Stage 3b chronic kidney disease (HCC) 11/02/2021    Prediabetes 10/28/2021    Stage 3 chronic kidney disease (HCC) 06/30/2020    SK (seborrheic keratosis) 06/30/2020    Multiple actinic keratoses 02/28/2019    NAFL (nonalcoholic fatty liver) 03/30/2018    Cervical radiculopathy 02/16/2018    Encounter for screening for lung cancer 05/09/2017    Coronary atherosclerosis due to calcified coronary lesion 12/14/2016    Mixed hyperlipidemia with apolipoprotein E4 variant 09/17/2014    Metabolic syndrome 09/17/2014    Atherosclerosis of both carotid arteries 08/11/2014    Abnormal chest CT 08/11/2014    Screening for osteoporosis 08/07/2014    Primary osteoarthritis of right knee 10/22/2013    Atherosclerosis of aorta (HCC) 10/22/2013    HTN (hypertension) 10/22/2013    Tobacco use 10/22/2013    History of kidney stones      Family History   Problem Relation Age  of Onset    Asthma Mother     Heart Attack Mother     Hypertension Father     Stroke Father     Dementia Father     Lung Disease Father         heavy smoker    Other Brother         Morbid Obesity    Alcohol/Drug Brother         prescription drug problem    Alcohol/Drug Brother         street drugs    Cancer Paternal Uncle         Pancreatic    Colon Cancer Cousin     Cancer Paternal Aunt         colon ca      Social History     Socioeconomic History    Marital status:      Spouse name: Not on file    Number of children: Not on file    Years of education: Not on file    Highest education level: Not on file   Occupational History    Not on file   Tobacco Use    Smoking status: Every Day     Packs/day: 1.00     Years: 50.00     Pack years: 50.00     Types: Cigarettes    Smokeless tobacco: Never    Tobacco comments:     05/09/17 -- currently 1/2 pack per day   Vaping Use    Vaping Use: Not on file   Substance and Sexual Activity    Alcohol use: Not Currently     Alcohol/week: 0.0 oz     Comment: 1x/month, 1 drink per time    Drug use: No    Sexual activity: Yes     Partners: Female     Birth control/protection: Post-Menopausal     Comment: ,  multiple companies   Other Topics Concern    Not on file   Social History Narrative    Not on file     Social Determinants of Health     Financial Resource Strain: Not on file   Food Insecurity: Not on file   Transportation Needs: Not on file   Physical Activity: Not on file   Stress: Not on file   Social Connections: Not on file   Intimate Partner Violence: Not on file   Housing Stability: Not on file       Current Outpatient Medications   Medication Sig Dispense Refill    clotrimazole-betamethasone (LOTRISONE) 1-0.05 % Cream Apply 1 Application topically 2 times a day. 45 g 0    betamethasone dipropionate (DIPROLENE) 0.05 % Ointment Apply 1 Application topically 2 times a day. 45 g 0    doxycycline (MONODOX) 100 MG capsule Take 1 Capsule by mouth 2 times a day.  "14 Capsule 0    methylPREDNISolone (MEDROL DOSEPAK) 4 MG Tablet Therapy Pack As directed on the packaging label. 21 Tablet 0    fluticasone (FLONASE) 50 MCG/ACT nasal spray Administer 1 Spray into affected nostril(S) every day. 16 g 1    losartan (COZAAR) 50 MG Tab Take 1 Tablet by mouth every day. 90 Tablet 3    ezetimibe (ZETIA) 10 MG Tab Take 1 Tablet by mouth every day. 90 Tablet 3    rosuvastatin (CRESTOR) 10 MG Tab TAKE ONE TABLET BY MOUTH EVERY EVENING 90 Tablet 3    amLODIPine (NORVASC) 10 MG Tab Take 1 Tablet by mouth every day. 90 Tablet 3    ketoconazole (NIZORAL) 2 % Cream       betamethasone dipropionate 0.05 % Cream Apply 1 Application topically 2 times a day. 45 g 3    folic acid (FOLVITE) 1 MG Tab Take 1 Tablet by mouth every day.      beclomethasone (QVAR) 40 MCG/ACT inhaler Inhale 1 Puff by mouth 2 Times a Day. 3 Inhaler 3    albuterol 108 (90 Base) MCG/ACT Aero Soln inhalation aerosol Inhale 2 Puffs by mouth every 6 hours as needed for Shortness of Breath. 8.5 g 12    Cholecalciferol (VITAMIN D) 2000 UNITS Cap Take 1 Capsule by mouth every day.      Cyanocobalamin (B-12) 1000 MCG TBCR Take 1 Each by mouth every day.       No current facility-administered medications for this visit.         Review Of Systems  As documented in HPI above  PHYSICAL EXAMINATION:    /60 (BP Location: Right arm, Patient Position: Sitting, BP Cuff Size: Adult)   Pulse 68   Temp 36.3 °C (97.4 °F) (Temporal)   Resp 16   Ht 1.753 m (5' 9\")   Wt 72.6 kg (160 lb)   SpO2 99%   BMI 23.63 kg/m²   Gen.: Well-developed, well-nourished, no apparent distress, pleasant and cooperative with the examination  HEENT: Normocephalic/atraumatic, sinuses nontender with palpation, TMs clear, nares patent with pink mucosa and clear rhinorrhea, oropharynx clear  Neck: No JVD or bruits, no adenopathy  Cor: Regular rate and rhythm without murmur gallop or rub  Lungs: Clear to auscultation with equal breath sounds bilaterally. No " wheezes, rhonchi.  Abdomen: Soft nontender without hepatosplenomegaly or masses appreciated, normoactive bowel sounds  Extremities: No cyanosis, clubbing or edema  Ideally clearance  Skin: Well-rounded 2 large lesions on the left leg.  Erythematous without scaling  ASSESSMENT/Plan:  1. Stage 3a chronic kidney disease (HCC)  Chronic, stable avoid NSAIDs and continue hydration we will keep monitoring kidney function test and blood pressure      2. Rash and nonspecific skin eruption  Ongoing problem.  Dermatology as per history diagnosed patient with psoriasis, will call for records.  Continue with steroid cream    clotrimazole-betamethasone (LOTRISONE) 1-0.05 % Cream         Please note that this dictation was created using voice recognition software. I have made every reasonable attempt to correct obvious errors but there may be errors of grammar and content that I may have overlooked prior to finalization of this note.

## 2023-03-28 ENCOUNTER — HOSPITAL ENCOUNTER (OUTPATIENT)
Dept: LAB | Facility: MEDICAL CENTER | Age: 73
End: 2023-03-28
Attending: INTERNAL MEDICINE
Payer: MEDICARE

## 2023-03-28 ENCOUNTER — OFFICE VISIT (OUTPATIENT)
Dept: MEDICAL GROUP | Facility: LAB | Age: 73
End: 2023-03-28
Payer: MEDICARE

## 2023-03-28 ENCOUNTER — HOSPITAL ENCOUNTER (OUTPATIENT)
Facility: MEDICAL CENTER | Age: 73
End: 2023-03-28
Attending: FAMILY MEDICINE
Payer: MEDICARE

## 2023-03-28 VITALS
WEIGHT: 154 LBS | TEMPERATURE: 97.1 F | OXYGEN SATURATION: 97 % | SYSTOLIC BLOOD PRESSURE: 120 MMHG | DIASTOLIC BLOOD PRESSURE: 62 MMHG | HEART RATE: 70 BPM | RESPIRATION RATE: 12 BRPM | BODY MASS INDEX: 22.81 KG/M2 | HEIGHT: 69 IN

## 2023-03-28 DIAGNOSIS — R82.90 URINE ABNORMALITY: ICD-10-CM

## 2023-03-28 DIAGNOSIS — N18.31 STAGE 3A CHRONIC KIDNEY DISEASE: ICD-10-CM

## 2023-03-28 DIAGNOSIS — R43.2 LOSS OF TASTE: ICD-10-CM

## 2023-03-28 DIAGNOSIS — H04.203 WATERY EYES: ICD-10-CM

## 2023-03-28 DIAGNOSIS — E55.9 VITAMIN D DEFICIENCY: ICD-10-CM

## 2023-03-28 DIAGNOSIS — R20.0 ARM NUMBNESS LEFT: ICD-10-CM

## 2023-03-28 LAB
ANION GAP SERPL CALC-SCNC: 13 MMOL/L (ref 7–16)
APPEARANCE UR: CLEAR
BILIRUB UR QL STRIP.AUTO: NEGATIVE
BUN SERPL-MCNC: 27 MG/DL (ref 8–22)
CALCIUM SERPL-MCNC: 9.6 MG/DL (ref 8.5–10.5)
CHLORIDE SERPL-SCNC: 104 MMOL/L (ref 96–112)
CO2 SERPL-SCNC: 23 MMOL/L (ref 20–33)
COLOR UR: YELLOW
CREAT SERPL-MCNC: 1.41 MG/DL (ref 0.5–1.4)
GFR SERPLBLD CREATININE-BSD FMLA CKD-EPI: 52 ML/MIN/1.73 M 2
GLUCOSE SERPL-MCNC: 108 MG/DL (ref 65–99)
GLUCOSE UR STRIP.AUTO-MCNC: NEGATIVE MG/DL
KETONES UR STRIP.AUTO-MCNC: ABNORMAL MG/DL
LEUKOCYTE ESTERASE UR QL STRIP.AUTO: NEGATIVE
MICRO URNS: ABNORMAL
NITRITE UR QL STRIP.AUTO: NEGATIVE
PH UR STRIP.AUTO: 5.5 [PH] (ref 5–8)
POTASSIUM SERPL-SCNC: 5.3 MMOL/L (ref 3.6–5.5)
PROT UR QL STRIP: NEGATIVE MG/DL
PTH-INTACT SERPL-MCNC: 36.1 PG/ML (ref 14–72)
RBC UR QL AUTO: NEGATIVE
SODIUM SERPL-SCNC: 140 MMOL/L (ref 135–145)
SP GR UR STRIP.AUTO: 1.02
UROBILINOGEN UR STRIP.AUTO-MCNC: 0.2 MG/DL

## 2023-03-28 PROCEDURE — 81003 URINALYSIS AUTO W/O SCOPE: CPT

## 2023-03-28 PROCEDURE — 83970 ASSAY OF PARATHORMONE: CPT

## 2023-03-28 PROCEDURE — 36415 COLL VENOUS BLD VENIPUNCTURE: CPT

## 2023-03-28 PROCEDURE — 80048 BASIC METABOLIC PNL TOTAL CA: CPT

## 2023-03-28 PROCEDURE — 99214 OFFICE O/P EST MOD 30 MIN: CPT | Performed by: FAMILY MEDICINE

## 2023-03-28 ASSESSMENT — FIBROSIS 4 INDEX: FIB4 SCORE: 1.99

## 2023-03-28 ASSESSMENT — PATIENT HEALTH QUESTIONNAIRE - PHQ9: CLINICAL INTERPRETATION OF PHQ2 SCORE: 0

## 2023-03-28 NOTE — PROGRESS NOTES
Chief Complaint:   Chief Complaint   Patient presents with    Other     Lost feeling on arm, patient thinks it's a pinched nerve since Friday.        HPI: Established patient  Deacon Pulido is a 72 y.o. male who presents for evaluation of the following today:    1. Arm numbness left /  loss of taste  New concern    Patient reports today that he developed this left arm numbness and tingling sensation for the past 4 days, the numbness is located at the site of his left arm and extends sometimes to the forearm without weakness.  He also reports that 1 week ago he lost the taste sensation, denies any symptoms suggestive of viral infection or COVID, denies upper symmetric symptoms fever loss of smell or cough.  Denies gait changes, no dizziness, no headache.  No other neurological signs  Urine abnormality  Reports some discomfort and mild itching sensation in the pineal area with urination.  Reports no blood in the urine.  No other concerns no flank pain or abdominal pain or fever    Watery eyes  New concern, noticing more watery eye and increased eye lacrimation, no redness or itching or upper respite tract symptoms or viral symptoms.          Past medical history, family history, social history and medications reviewed and updated in the record.   Current medications, problem list and allergies reviewed in [facility]  [employer] maintenance topics are reviewed and updated.    Patient Active Problem List    Diagnosis Date Noted    Encounter to establish care with new doctor 10/28/2021    Lung nodules 11/30/2021    Right kidney mass 11/19/2021    Weight loss 11/19/2021    Prediabetes 10/28/2021    Stage 3 chronic kidney disease (HCC) 06/30/2020    SK (seborrheic keratosis) 06/30/2020    Multiple actinic keratoses 02/28/2019    NAFL (nonalcoholic fatty liver) 03/30/2018    Cervical radiculopathy 02/16/2018    Encounter for screening for lung cancer 05/09/2017    Coronary atherosclerosis due to calcified coronary lesion  12/14/2016    Mixed hyperlipidemia with apolipoprotein E4 variant 09/17/2014    Metabolic syndrome 09/17/2014    Atherosclerosis of both carotid arteries 08/11/2014    Abnormal chest CT 08/11/2014    Screening for osteoporosis 08/07/2014    Primary osteoarthritis of right knee 10/22/2013    Atherosclerosis of aorta (HCC) 10/22/2013    HTN (hypertension) 10/22/2013    Tobacco use 10/22/2013    History of kidney stones      Family History   Problem Relation Age of Onset    Asthma Mother     Heart Attack Mother     Hypertension Father     Stroke Father     Dementia Father     Lung Disease Father         heavy smoker    Other Brother         Morbid Obesity    Alcohol/Drug Brother         prescription drug problem    Alcohol/Drug Brother         street drugs    Cancer Paternal Uncle         Pancreatic    Colon Cancer Cousin     Cancer Paternal Aunt         colon ca      Social History     Socioeconomic History    Marital status:      Spouse name: Not on file    Number of children: Not on file    Years of education: Not on file    Highest education level: Not on file   Occupational History    Not on file   Tobacco Use    Smoking status: Every Day     Packs/day: 1.00     Years: 50.00     Pack years: 50.00     Types: Cigarettes    Smokeless tobacco: Never    Tobacco comments:     05/09/17 -- currently 1/2 pack per day   Vaping Use    Vaping Use: Not on file   Substance and Sexual Activity    Alcohol use: Not Currently     Alcohol/week: 0.0 oz     Comment: 1x/month, 1 drink per time    Drug use: No    Sexual activity: Yes     Partners: Female     Birth control/protection: Post-Menopausal     Comment: ,  multiple companies   Other Topics Concern    Not on file   Social History Narrative    Not on file     Social Determinants of Health     Financial Resource Strain: Not on file   Food Insecurity: Not on file   Transportation Needs: Not on file   Physical Activity: Not on file   Stress: Not on file   Social  "Connections: Not on file   Intimate Partner Violence: Not on file   Housing Stability: Not on file       Current Outpatient Medications   Medication Sig Dispense Refill    clotrimazole-betamethasone (LOTRISONE) 1-0.05 % Cream Apply 1 Application topically 2 times a day. 45 g 0    betamethasone dipropionate (DIPROLENE) 0.05 % Ointment Apply 1 Application topically 2 times a day. 45 g 0    doxycycline (MONODOX) 100 MG capsule Take 1 Capsule by mouth 2 times a day. 14 Capsule 0    methylPREDNISolone (MEDROL DOSEPAK) 4 MG Tablet Therapy Pack As directed on the packaging label. 21 Tablet 0    fluticasone (FLONASE) 50 MCG/ACT nasal spray Administer 1 Spray into affected nostril(S) every day. 16 g 1    losartan (COZAAR) 50 MG Tab Take 1 Tablet by mouth every day. 90 Tablet 3    ezetimibe (ZETIA) 10 MG Tab Take 1 Tablet by mouth every day. 90 Tablet 3    rosuvastatin (CRESTOR) 10 MG Tab TAKE ONE TABLET BY MOUTH EVERY EVENING 90 Tablet 3    amLODIPine (NORVASC) 10 MG Tab Take 1 Tablet by mouth every day. 90 Tablet 3    ketoconazole (NIZORAL) 2 % Cream       betamethasone dipropionate 0.05 % Cream Apply 1 Application topically 2 times a day. 45 g 3    folic acid (FOLVITE) 1 MG Tab Take 1 Tablet by mouth every day.      beclomethasone (QVAR) 40 MCG/ACT inhaler Inhale 1 Puff by mouth 2 Times a Day. 3 Inhaler 3    albuterol 108 (90 Base) MCG/ACT Aero Soln inhalation aerosol Inhale 2 Puffs by mouth every 6 hours as needed for Shortness of Breath. 8.5 g 12    Cholecalciferol (VITAMIN D) 2000 UNITS Cap Take 1 Capsule by mouth every day.      Cyanocobalamin (B-12) 1000 MCG TBCR Take 1 Each by mouth every day.       No current facility-administered medications for this visit.         Review Of Systems  As documented in HPI above  PHYSICAL EXAMINATION:    /62 (BP Location: Right arm, Patient Position: Sitting, BP Cuff Size: Adult)   Pulse 70   Temp 36.2 °C (97.1 °F) (Temporal)   Resp 12   Ht 1.753 m (5' 9\")   Wt 69.9 kg " (154 lb)   SpO2 97%   BMI 22.74 kg/m²   Gen.: Well-developed, well-nourished, no apparent distress, pleasant and cooperative with the examination  HEENT: Normocephalic/atraumatic,     Full neurological exam: Awake alert oriented X3 neck: No JVD or bruits, no adenopathy  No focal neurological deficit, cranial nerve exam within normal limits, no motor function abnormality or sensory changes.    Cor: Regular rate and rhythm without murmur gallop or rub  Lungs: Clear to auscultation with equal breath sounds bilaterally. No wheezes, rhonchi.  Abdomen: Soft nontender without hepatosplenomegaly or masses appreciated, normoactive bowel sounds  Extremities: No cyanosis, clubbing or edema       ASSESSMENT/Plan:  1. Arm numbness left  New concern, with normal neurological clinical exam, associated with loss of taste sensation, advised to do an MRI of the brain to rule out pathology like stroke or brain mass.  Follow-up as directed, if worsening of symptoms patient report to emergency room  MR-BRAIN-W/O      2. Loss of taste  New concern associated with numbness in the left upper extremity without upper symmetric symptoms or viral symptoms.  Patient to do an MRI of the brain for further evaluation to rule out neurological condition  MR-BRAIN-W/O      3. Urine abnormality  Will send urine to lab today to rule out UTI  URINALYSIS,CULTURE IF INDICATED      4. Watery eyes  New concern, most likely related to dry eye, no signs of infection on clinical exam, advised to start artificial tears for 4 moisturization.

## 2023-03-30 ENCOUNTER — HOSPITAL ENCOUNTER (OUTPATIENT)
Dept: RADIOLOGY | Facility: MEDICAL CENTER | Age: 73
End: 2023-03-30
Attending: FAMILY MEDICINE
Payer: MEDICARE

## 2023-03-30 ENCOUNTER — TELEPHONE (OUTPATIENT)
Dept: HEALTH INFORMATION MANAGEMENT | Facility: OTHER | Age: 73
End: 2023-03-30
Payer: MEDICARE

## 2023-03-30 DIAGNOSIS — I63.511 CEREBROVASCULAR ACCIDENT (CVA) DUE TO OCCLUSION OF RIGHT MIDDLE CEREBRAL ARTERY (HCC): ICD-10-CM

## 2023-03-30 DIAGNOSIS — R43.2 LOSS OF TASTE: ICD-10-CM

## 2023-03-30 DIAGNOSIS — R20.0 ARM NUMBNESS LEFT: ICD-10-CM

## 2023-03-30 PROCEDURE — 70551 MRI BRAIN STEM W/O DYE: CPT

## 2023-03-31 ENCOUNTER — OFFICE VISIT (OUTPATIENT)
Dept: MEDICAL GROUP | Facility: LAB | Age: 73
End: 2023-03-31
Payer: MEDICARE

## 2023-03-31 VITALS
OXYGEN SATURATION: 97 % | RESPIRATION RATE: 16 BRPM | WEIGHT: 154 LBS | DIASTOLIC BLOOD PRESSURE: 70 MMHG | HEIGHT: 69 IN | TEMPERATURE: 97.1 F | SYSTOLIC BLOOD PRESSURE: 140 MMHG | BODY MASS INDEX: 22.81 KG/M2 | HEART RATE: 70 BPM

## 2023-03-31 DIAGNOSIS — F17.200 SMOKER: ICD-10-CM

## 2023-03-31 DIAGNOSIS — I63.511 CEREBROVASCULAR ACCIDENT (CVA) DUE TO OCCLUSION OF RIGHT MIDDLE CEREBRAL ARTERY (HCC): ICD-10-CM

## 2023-03-31 DIAGNOSIS — E78.5 HYPERLIPIDEMIA, UNSPECIFIED HYPERLIPIDEMIA TYPE: ICD-10-CM

## 2023-03-31 PROCEDURE — 99214 OFFICE O/P EST MOD 30 MIN: CPT | Performed by: FAMILY MEDICINE

## 2023-03-31 RX ORDER — NICOTINE 21 MG/24HR
1 PATCH, TRANSDERMAL 24 HOURS TRANSDERMAL EVERY 24 HOURS
Qty: 30 PATCH | Refills: 1 | Status: SHIPPED
Start: 2023-03-31 | End: 2023-10-11

## 2023-03-31 RX ORDER — ROSUVASTATIN CALCIUM 40 MG/1
40 TABLET, COATED ORAL DAILY
Qty: 30 TABLET | Refills: 3 | Status: SHIPPED | OUTPATIENT
Start: 2023-03-31 | End: 2023-08-01 | Stop reason: SDUPTHER

## 2023-03-31 ASSESSMENT — FIBROSIS 4 INDEX: FIB4 SCORE: 1.99

## 2023-03-31 NOTE — PROGRESS NOTES
Chief Complaint:   Chief Complaint   Patient presents with    Follow-Up     MRI       HPI: Established patient, accompanied today with his wife  Deacon Pulido is a 72 y.o. male who presents for evaluation of acute stroke, I called patient yesterday to come today for further discussion of his MRI findings and new recheck    1. Hyperlipidemia, unspecified hyperlipidemia type  Chronic, on low-dose Crestor, discussed with patient to increase the dose to 40 mg because of this recent stroke.    2. Cerebrovascular accident (CVA) due to occlusion of right middle cerebral artery   New concern, patient was seen around 5 days ago, complaining of new onset left upper extremity numbness and tingling sensation, and change in taste, MRI was ordered urgently, yesterday received the results of the MRI which shows evidence of acute infarcts on the tributaries of right MCA, patient still has the numbness in the left upper extremity extended to the hand, and change in taste, denies headache or other symptoms, no worsening of symptoms.    3. Smoker  Counseled strongly on smoking cessation because of the risk to develop stroke.  Patient agreed and he would like to start nicotine patches.  He smokes usually 10 cigarettes/day          Past medical history, family history, social history and medications reviewed and updated in the record.  Today  Current medications, problem list and allergies reviewed in [facility] today  Health maintenance topics are reviewed and updated.    Patient Active Problem List    Diagnosis Date Noted    Encounter to establish care with new doctor 10/28/2021    Cerebrovascular accident (CVA) due to occlusion of right middle cerebral artery (HCC) 03/30/2023    Lung nodules 11/30/2021    Right kidney mass 11/19/2021    Weight loss 11/19/2021    Prediabetes 10/28/2021    Stage 3 chronic kidney disease (HCC) 06/30/2020    SK (seborrheic keratosis) 06/30/2020    Multiple actinic keratoses 02/28/2019    NAFL (nonalcoholic  fatty liver) 03/30/2018    Cervical radiculopathy 02/16/2018    Encounter for screening for lung cancer 05/09/2017    Coronary atherosclerosis due to calcified coronary lesion 12/14/2016    Mixed hyperlipidemia with apolipoprotein E4 variant 09/17/2014    Metabolic syndrome 09/17/2014    Atherosclerosis of both carotid arteries 08/11/2014    Abnormal chest CT 08/11/2014    Screening for osteoporosis 08/07/2014    Primary osteoarthritis of right knee 10/22/2013    Atherosclerosis of aorta (HCC) 10/22/2013    HTN (hypertension) 10/22/2013    Tobacco use 10/22/2013    History of kidney stones      Family History   Problem Relation Age of Onset    Asthma Mother     Heart Attack Mother     Hypertension Father     Stroke Father     Dementia Father     Lung Disease Father         heavy smoker    Other Brother         Morbid Obesity    Alcohol/Drug Brother         prescription drug problem    Alcohol/Drug Brother         street drugs    Cancer Paternal Uncle         Pancreatic    Colon Cancer Cousin     Cancer Paternal Aunt         colon ca      Social History     Socioeconomic History    Marital status:      Spouse name: Not on file    Number of children: Not on file    Years of education: Not on file    Highest education level: Not on file   Occupational History    Not on file   Tobacco Use    Smoking status: Every Day     Packs/day: 1.00     Years: 50.00     Pack years: 50.00     Types: Cigarettes    Smokeless tobacco: Never    Tobacco comments:     05/09/17 -- currently 1/2 pack per day   Vaping Use    Vaping Use: Not on file   Substance and Sexual Activity    Alcohol use: Not Currently     Alcohol/week: 0.0 oz     Comment: 1x/month, 1 drink per time    Drug use: No    Sexual activity: Yes     Partners: Female     Birth control/protection: Post-Menopausal     Comment: ,  multiple companies   Other Topics Concern    Not on file   Social History Narrative    Not on file     Social Determinants of Health      Financial Resource Strain: Not on file   Food Insecurity: Not on file   Transportation Needs: Not on file   Physical Activity: Not on file   Stress: Not on file   Social Connections: Not on file   Intimate Partner Violence: Not on file   Housing Stability: Not on file     Current Outpatient Medications   Medication Sig Dispense Refill    aspirin EC (ECOTRIN) 81 MG Tablet Delayed Response Take 1 Tablet by mouth every day. 100 Tablet 3    rosuvastatin (CRESTOR) 40 MG tablet Take 1 Tablet by mouth every day. 30 Tablet 3    nicotine (NICODERM) 14 MG/24HR PATCH 24 HR Place 1 Patch on the skin every 24 hours. 30 Patch 1    clotrimazole-betamethasone (LOTRISONE) 1-0.05 % Cream Apply 1 Application topically 2 times a day. 45 g 0    betamethasone dipropionate (DIPROLENE) 0.05 % Ointment Apply 1 Application topically 2 times a day. 45 g 0    doxycycline (MONODOX) 100 MG capsule Take 1 Capsule by mouth 2 times a day. 14 Capsule 0    methylPREDNISolone (MEDROL DOSEPAK) 4 MG Tablet Therapy Pack As directed on the packaging label. 21 Tablet 0    fluticasone (FLONASE) 50 MCG/ACT nasal spray Administer 1 Spray into affected nostril(S) every day. 16 g 1    losartan (COZAAR) 50 MG Tab Take 1 Tablet by mouth every day. 90 Tablet 3    ezetimibe (ZETIA) 10 MG Tab Take 1 Tablet by mouth every day. 90 Tablet 3    amLODIPine (NORVASC) 10 MG Tab Take 1 Tablet by mouth every day. 90 Tablet 3    ketoconazole (NIZORAL) 2 % Cream       betamethasone dipropionate 0.05 % Cream Apply 1 Application topically 2 times a day. 45 g 3    folic acid (FOLVITE) 1 MG Tab Take 1 Tablet by mouth every day.      beclomethasone (QVAR) 40 MCG/ACT inhaler Inhale 1 Puff by mouth 2 Times a Day. 3 Inhaler 3    albuterol 108 (90 Base) MCG/ACT Aero Soln inhalation aerosol Inhale 2 Puffs by mouth every 6 hours as needed for Shortness of Breath. 8.5 g 12    Cholecalciferol (VITAMIN D) 2000 UNITS Cap Take 1 Capsule by mouth every day.      Cyanocobalamin (B-12) 1000  "MCG TBCR Take 1 Each by mouth every day.       No current facility-administered medications for this visit.        Review Of Systems  As documented in HPI above  PHYSICAL EXAMINATION:    BP (!) 140/70 (BP Location: Left arm, Patient Position: Sitting, BP Cuff Size: Adult)   Pulse 70   Temp 36.2 °C (97.1 °F) (Temporal)   Resp 16   Ht 1.753 m (5' 9\")   Wt 69.9 kg (154 lb)   SpO2 97%   BMI 22.74 kg/m²   Gen.: Well-developed, well-nourished, no apparent distress, pleasant and cooperative with the examination  HEENT: Normocephalic/atraumatic,     Neck: No JVD or bruits, no adenopathy  Cor: Regular rate and rhythm without murmur gallop or rub  Lungs: Clear to auscultation with equal breath sounds bilaterally. No wheezes, rhonchi.  Abdomen: Soft nontender without hepatosplenomegaly or masses appreciated, normoactive bowel sounds  Extremities: No cyanosis, clubbing or edema     Neurological exam: Awake alert, oriented X.3, sensory changes in the left upper extremity noted, no other cranial nerve abnormalities, no motor function deficiency, no focal neurological deficit.  ASSESSMENT/Plan:  1. Hyperlipidemia, unspecified hyperlipidemia type  Chronic, patient to increase the dose to 40 mg daily of Crestor, and add baby aspirin, because of this recent stroke findings on MRI  aspirin EC (ECOTRIN) 81 MG Tablet Delayed Response    rosuvastatin (CRESTOR) 40 MG tablet      2. Cerebrovascular accident (CVA) due to occlusion of right middle cerebral artery (HCC)  New concern, started around 1 week ago by change in taste and then followed by numbness and tingling sensation on the left upper extremity, MRI was ordered, shows evidence of acute infarcts as described above, patient to follow-up with neurology, I will increase the dose of statins and add aspirin, advised to do a Doppler ultrasound, quit smoking and will monitor blood pressure.  Follow-up as directed if any worsening of symptoms report to emergency room  aspirin EC " (ECOTRIN) 81 MG Tablet Delayed Response    rosuvastatin (CRESTOR) 40 MG tablet    US-CAROTID DOPPLER BILAT      3. Smoker  Counseled for smoking cessation, prescription of 17 mg of nicotine sent to his pharmacy.      Please note that this dictation was created using voice recognition software. I have made every reasonable attempt to correct obvious errors but there may be errors of grammar and content that I may have overlooked prior to finalization of this note.

## 2023-04-05 ENCOUNTER — OFFICE VISIT (OUTPATIENT)
Dept: NEPHROLOGY | Facility: MEDICAL CENTER | Age: 73
End: 2023-04-05
Payer: MEDICARE

## 2023-04-05 VITALS
SYSTOLIC BLOOD PRESSURE: 120 MMHG | WEIGHT: 157 LBS | DIASTOLIC BLOOD PRESSURE: 68 MMHG | HEIGHT: 69 IN | HEART RATE: 79 BPM | TEMPERATURE: 98.4 F | OXYGEN SATURATION: 100 % | BODY MASS INDEX: 23.25 KG/M2

## 2023-04-05 DIAGNOSIS — I10 PRIMARY HYPERTENSION: ICD-10-CM

## 2023-04-05 DIAGNOSIS — N20.0 NEPHROLITHIASIS: ICD-10-CM

## 2023-04-05 DIAGNOSIS — E55.9 VITAMIN D DEFICIENCY: ICD-10-CM

## 2023-04-05 DIAGNOSIS — D64.9 ANEMIA, UNSPECIFIED TYPE: ICD-10-CM

## 2023-04-05 DIAGNOSIS — N18.31 STAGE 3A CHRONIC KIDNEY DISEASE: ICD-10-CM

## 2023-04-05 DIAGNOSIS — R80.9 MICROALBUMINURIA: ICD-10-CM

## 2023-04-05 PROCEDURE — 99214 OFFICE O/P EST MOD 30 MIN: CPT | Performed by: INTERNAL MEDICINE

## 2023-04-05 ASSESSMENT — ENCOUNTER SYMPTOMS
SHORTNESS OF BREATH: 0
COUGH: 0
ABDOMINAL PAIN: 0
WEIGHT LOSS: 0
CHILLS: 0
FEVER: 0
VOMITING: 0
EYES NEGATIVE: 1
FLANK PAIN: 0
FOCAL WEAKNESS: 1
PALPITATIONS: 0
NAUSEA: 0
ORTHOPNEA: 0
WHEEZING: 0
SINUS PAIN: 0
HEMOPTYSIS: 0

## 2023-04-05 ASSESSMENT — FIBROSIS 4 INDEX: FIB4 SCORE: 1.99

## 2023-04-05 NOTE — PROGRESS NOTES
Subjective     Deacon Pulido is a 72 y.o. male who presents with Chronic Kidney Disease            Chronic Kidney Disease  Pertinent negatives include no abdominal pain, chest pain, chills, congestion, coughing, fever, nausea or vomiting.   Deacon is coming today for f/u of CKD IIIa  Recently suffered from stroke resulted in left arm weakness  Doing better -LUE weakness improving  No dysuria/hematuria flank pain  Long term hx/of HTN  (+) nephrolithiasis -stable  CKD III a  -creat level stable at -baseline ( 1.3-1.4)  HTN: BP very well controlled    Review of Systems   Constitutional:  Negative for chills, fever, malaise/fatigue and weight loss.   HENT:  Negative for congestion, hearing loss and sinus pain.    Eyes: Negative.    Respiratory:  Negative for cough, hemoptysis, shortness of breath and wheezing.    Cardiovascular:  Negative for chest pain, palpitations, orthopnea and leg swelling.   Gastrointestinal:  Negative for abdominal pain, nausea and vomiting.   Genitourinary:  Negative for dysuria, flank pain, frequency, hematuria and urgency.   Skin: Negative.    Neurological:  Positive for focal weakness.   All other systems reviewed and are negative.       Past Medical History:   Diagnosis Date    Allergy     Cervical radiculopathy 2/16/2018    Family history of dementia     Family history of hypertension     Family history of stroke     Hypertension     Kidney stones     Multiple actinic keratoses 2/28/2019    NAFL (nonalcoholic fatty liver) 3/30/2018    Dx on CT Chest 2/24/18    SK (seborrheic keratosis) 6/30/2020    Stage 3 chronic kidney disease (HCC) 6/30/2020    Tobacco use 10/22/2013    Type 2 diabetes mellitus without complication, without long-term current use of insulin (HCC) 9/17/2014       Family History   Problem Relation Age of Onset    Asthma Mother     Heart Attack Mother     Hypertension Father     Stroke Father     Dementia Father     Lung Disease Father         heavy smoker    Other Brother  "        Morbid Obesity    Alcohol/Drug Brother         prescription drug problem    Alcohol/Drug Brother         street drugs    Cancer Paternal Uncle         Pancreatic    Colon Cancer Cousin     Cancer Paternal Aunt         colon ca        Social History     Socioeconomic History    Marital status:    Tobacco Use    Smoking status: Every Day     Packs/day: 1.00     Years: 50.00     Pack years: 50.00     Types: Cigarettes    Smokeless tobacco: Never    Tobacco comments:     05/09/17 -- currently 1/2 pack per day   Substance and Sexual Activity    Alcohol use: Not Currently     Alcohol/week: 0.0 oz     Comment: 1x/month, 1 drink per time    Drug use: No    Sexual activity: Yes     Partners: Female     Birth control/protection: Post-Menopausal     Comment: ,  multiple companies         Objective     /68 (BP Location: Right arm, Patient Position: Sitting, BP Cuff Size: Adult)   Pulse 79   Temp 36.9 °C (98.4 °F) (Temporal)   Ht 1.753 m (5' 9\")   Wt 71.2 kg (157 lb)   SpO2 100%   BMI 23.18 kg/m²      Physical Exam  Vitals reviewed.   Constitutional:       General: He is not in acute distress.     Appearance: Normal appearance. He is well-developed. He is not diaphoretic.   HENT:      Head: Normocephalic and atraumatic.      Nose: Nose normal.      Mouth/Throat:      Mouth: Mucous membranes are moist.      Pharynx: Oropharynx is clear.   Eyes:      Extraocular Movements: Extraocular movements intact.      Conjunctiva/sclera: Conjunctivae normal.      Pupils: Pupils are equal, round, and reactive to light.   Cardiovascular:      Rate and Rhythm: Normal rate and regular rhythm.      Pulses: Normal pulses.      Heart sounds: Normal heart sounds.   Pulmonary:      Effort: Pulmonary effort is normal. No respiratory distress.      Breath sounds: Normal breath sounds. No wheezing or rales.   Abdominal:      General: Bowel sounds are normal. There is no distension.      Palpations: Abdomen is soft. " There is no mass.      Tenderness: There is no abdominal tenderness. There is no right CVA tenderness or left CVA tenderness.   Musculoskeletal:      Cervical back: Normal range of motion and neck supple.      Right lower leg: No edema.      Left lower leg: No edema.   Skin:     General: Skin is warm.      Coloration: Skin is not pale.      Findings: No erythema or rash.   Neurological:      Mental Status: He is alert and oriented to person, place, and time.      Cranial Nerves: No cranial nerve deficit.      Coordination: Coordination normal.      Comments: ANA MARIA stone 4/5   Psychiatric:         Mood and Affect: Mood normal.         Behavior: Behavior normal.         Thought Content: Thought content normal.         Judgment: Judgment normal.                  Laboratory results reviewed: d/w Pt  Lab Results   Component Value Date/Time    CREATININE 1.41 (H) 03/28/2023 11:31 AM    POTASSIUM 5.3 03/28/2023 11:31 AM         Assessment & Plan        1. Stage 3a chronic kidney disease (HCC)       Creat level stable at baseline-to monitor       Keep well hydrated    2. Primary hypertension      BP well controlled now -to monitor      3. Vitamin D deficiency      Well controlled --to monitor      4. Microalbuminuria      negative      5. Nephrolithiasis      left kidney stone - non obstructing      asymptomatoc    6. Anemia, unspecified type      Hb level stable -WNL            Recs:  Continue current treatment  Keep well hydrated  Monitor BP  Avoid NSAID's  Low salt diet  F/u in 6 months

## 2023-04-21 ENCOUNTER — OFFICE VISIT (OUTPATIENT)
Dept: NEUROLOGY | Facility: MEDICAL CENTER | Age: 73
End: 2023-04-21
Attending: PSYCHIATRY & NEUROLOGY
Payer: MEDICARE

## 2023-04-21 ENCOUNTER — NON-PROVIDER VISIT (OUTPATIENT)
Dept: CARDIOLOGY | Facility: MEDICAL CENTER | Age: 73
End: 2023-04-21
Payer: MEDICARE

## 2023-04-21 VITALS
HEART RATE: 75 BPM | SYSTOLIC BLOOD PRESSURE: 120 MMHG | DIASTOLIC BLOOD PRESSURE: 64 MMHG | RESPIRATION RATE: 16 BRPM | TEMPERATURE: 97.4 F | WEIGHT: 154.98 LBS | BODY MASS INDEX: 22.96 KG/M2 | OXYGEN SATURATION: 97 % | HEIGHT: 69 IN

## 2023-04-21 DIAGNOSIS — I49.3 PVC'S (PREMATURE VENTRICULAR CONTRACTIONS): ICD-10-CM

## 2023-04-21 DIAGNOSIS — I63.511 CEREBROVASCULAR ACCIDENT (CVA) DUE TO OCCLUSION OF RIGHT MIDDLE CEREBRAL ARTERY (HCC): ICD-10-CM

## 2023-04-21 DIAGNOSIS — I47.10 SVT (SUPRAVENTRICULAR TACHYCARDIA) (HCC): ICD-10-CM

## 2023-04-21 DIAGNOSIS — I49.1 APC (ATRIAL PREMATURE CONTRACTIONS): ICD-10-CM

## 2023-04-21 DIAGNOSIS — Z86.73 HISTORY OF ISCHEMIC RIGHT MCA STROKE: Primary | ICD-10-CM

## 2023-04-21 PROCEDURE — 99212 OFFICE O/P EST SF 10 MIN: CPT | Performed by: PSYCHIATRY & NEUROLOGY

## 2023-04-21 PROCEDURE — 99204 OFFICE O/P NEW MOD 45 MIN: CPT | Performed by: PSYCHIATRY & NEUROLOGY

## 2023-04-21 ASSESSMENT — FIBROSIS 4 INDEX: FIB4 SCORE: 1.99

## 2023-04-21 NOTE — PROGRESS NOTES
Chief Complaint   Patient presents with    New Patient     Stroke bridge,CVA       History of present illness:  Deacon Pulido 72 y.o. male smoker presents to stroke bridge clinic. This patient presented with L arm numbness/tingling on 3/28 and had a brain MRI that demonstrated R MCA territory infarcts.   His numbness is improved. He had weakness of the left arm for 4-5 days that resolved, and now is back to normal. This occurred in late March.   He smokes half a pack daily but is working on quitting.  His PCP increased his rosuvastatin to 40mg and resumed him on ASA 81.      Past medical history:   Past Medical History:   Diagnosis Date    Allergy     Cervical radiculopathy 2/16/2018    Family history of dementia     Family history of hypertension     Family history of stroke     Hypertension     Kidney stones     Multiple actinic keratoses 2/28/2019    NAFL (nonalcoholic fatty liver) 3/30/2018    Dx on CT Chest 2/24/18    SK (seborrheic keratosis) 6/30/2020    Stage 3 chronic kidney disease (HCC) 6/30/2020    Tobacco use 10/22/2013    Type 2 diabetes mellitus without complication, without long-term current use of insulin (HCC) 9/17/2014       Past surgical history:   Past Surgical History:   Procedure Laterality Date    ORIF, KNEE Right     X 3    OTHER ORTHOPEDIC SURGERY Right     chronic dislocating shoulder    TONSILLECTOMY         Family history:   Family History   Problem Relation Age of Onset    Asthma Mother     Heart Attack Mother     Hypertension Father     Stroke Father     Dementia Father     Lung Disease Father         heavy smoker    Other Brother         Morbid Obesity    Alcohol/Drug Brother         prescription drug problem    Alcohol/Drug Brother         street drugs    Cancer Paternal Uncle         Pancreatic    Colon Cancer Cousin     Cancer Paternal Aunt         colon ca        Social history:   Social History     Socioeconomic History    Marital status:      Spouse name: Not on file     Number of children: Not on file    Years of education: Not on file    Highest education level: Not on file   Occupational History    Not on file   Tobacco Use    Smoking status: Every Day     Packs/day: 1.00     Years: 50.00     Pack years: 50.00     Types: Cigarettes    Smokeless tobacco: Never    Tobacco comments:     05/09/17 -- currently 1/2 pack per day   Vaping Use    Vaping Use: Not on file   Substance and Sexual Activity    Alcohol use: Not Currently     Alcohol/week: 0.0 oz     Comment: 1x/month, 1 drink per time    Drug use: No    Sexual activity: Yes     Partners: Female     Birth control/protection: Post-Menopausal     Comment: ,  multiple companies   Other Topics Concern    Not on file   Social History Narrative    Not on file     Social Determinants of Health     Financial Resource Strain: Not on file   Food Insecurity: Not on file   Transportation Needs: Not on file   Physical Activity: Not on file   Stress: Not on file   Social Connections: Not on file   Intimate Partner Violence: Not on file   Housing Stability: Not on file       Current medications:   Current Outpatient Medications   Medication    aspirin EC (ECOTRIN) 81 MG Tablet Delayed Response    rosuvastatin (CRESTOR) 40 MG tablet    clotrimazole-betamethasone (LOTRISONE) 1-0.05 % Cream    betamethasone dipropionate (DIPROLENE) 0.05 % Ointment    fluticasone (FLONASE) 50 MCG/ACT nasal spray    losartan (COZAAR) 50 MG Tab    ezetimibe (ZETIA) 10 MG Tab    amLODIPine (NORVASC) 10 MG Tab    ketoconazole (NIZORAL) 2 % Cream    betamethasone dipropionate 0.05 % Cream    beclomethasone (QVAR) 40 MCG/ACT inhaler    albuterol 108 (90 Base) MCG/ACT Aero Soln inhalation aerosol    Cholecalciferol (VITAMIN D) 2000 UNITS Cap    nicotine (NICODERM) 14 MG/24HR PATCH 24 HR    doxycycline (MONODOX) 100 MG capsule    methylPREDNISolone (MEDROL DOSEPAK) 4 MG Tablet Therapy Pack    folic acid (FOLVITE) 1 MG Tab    Cyanocobalamin (B-12) 1000 MCG TBCR      No current facility-administered medications for this visit.       Medication Allergy:  Allergies   Allergen Reactions    Penicillamine Unspecified     high    Cephalexin     Cephalosporins     Peanut-Derived        Physical examination:     Current NIHSS    1a. LOC: 0  1b. LOC Questions: 0  1c. LOC Commands: 0  2. Best Gaze:0  3. Visual Fields: 0  4. Facial Paresis: 0  5a. Motor arm left: 0  5b. Motor arm right: 0  6a. Motor leg left: 0  6b. Motor leg right: 0  7. Sensory: 0  8. Best Language: 0  9. Limb Ataxia: 0  10. Dysarthria: 0  11. Extinction/Inattention: 0    Total Score: 0        Current mRS 0    Labs:  I reviewed the following labs personally:  Lab Results   Component Value Date/Time    HBA1C 5.6 08/18/2022 10:59 AM      Lab Results   Component Value Date/Time    CHOLSTRLTOT 130 10/28/2021 1354    TRIGLYCERIDE 108 10/28/2021 1354    HDL 69 10/28/2021 1354    LDL 39 10/28/2021 1354       Imaging:   Carotid ultrasound   Findings   Right:   1. The common caortid is normal.   2. The external carotid is normal.   3. The internal carotid has mild, < 50% heterogenous plaque, proximally. 45% by diameter reduction measurement.   4. The vertebal is normal with antegrade flow.   5. The subclavian is normal.     Left:   1. The common caortid is normal.   2. The external carotid has mild, < 50% plaque, proximally.   3. The internal carotid has mild, < 50% heterogenous plaque at the bulb and proximally.   4. The vertebal is normal with antegrade flow.   5. The subclavian is normal.     Conclusion   1. Mild < 50% plaque of bilateral proximal internal caortid arteries.   2. Mild, < 50% plaque of proximal left external carotid.   3. Normal vertebral and subclavian arteries.      Echocardiogram   Echo Enhancing Agent   Indication: Rule Out Septal Defect   Agent/Amount Used: Agitated Saline  cc   Comments: Positive bubble study performed. Vani LOPEZ administered saline.     Left Ventricle   Normal left ventricular cavity  size with normal systolic function. No wall motion abnormalities. Normal ejection fraction 65-70% by Thakkar's Biplane. Grade 1 ( impaired relaxation) diastolic function with normal LV filling pressures. Left ventricular strain of -21.5%. Mild concentric hypertrophy. No VSD or apical thrombus.     Right Ventricle   Normal right ventricular size and function. Normal TAPSE and S' of 23 mm aand 15 cm/s, respectively.     Atria   Normal left atrial size. Left atrial volume index 27 mL/m2. Positive bubble study performed with more than 30 bubble crossing over to left heart with cough. Normal right atrial size. Right atrial area 12 cm2.     Aortic Valve   Aortic valve is mildly sclerotic without stenosis. The valve is trileaflet in structure. No aortic regurgitation.     Mitral Valve   Mitral valve is normal in structure and function without stenosis. Trace mitral regurgitation.     Tricuspid Valve   Tricuspid valve is normal in structure and function without stenosis. Trace tricuspid regurgitation with peak velocity 270 cm/s. RVSP 32 mmHg, given RAP of 3 mmHg.     Pulmonic Valve   Pulmonic valve is normal in structure and function without stenosis. No pulmonic regurgitation.     Great Vessels   Aortic root, ascending aorta, aortic arch not visualized. IVC is normal in size and collapses >50% with inspiration. The pulmonary artery is normal size.     Pericardium   No pericardial effusion. No pleural effusion.      MRI BRAIN w/o  I reviewed the images personally and agree with the following read:     IMPRESSION:     1.  Mild cerebral atrophy. Age-appropriate.  2.  Minimal supratentorial white matter disease most consistent with microvascular ischemic change.  3.  Multiple small punctate and tiny gyriform scattered foci of bright diffusion signal over the right frontal convexity and right parietal convexity consistent with acute infarcts in the territory of distal right MCA branches.    ASSESSMENT AND PLAN:  Problem List  Items Addressed This Visit    None  Visit Diagnoses       History of ischemic right MCA stroke    -  Primary    Relevant Orders    Cardiac Event Monitor    CT-CTA HEAD WITH & W/O-POST PROCESS    CT-CTA NECK WITH & W/O-POST PROCESSING            1. History of ischemic right MCA stroke  - Cardiac Event Monitor; Future  - CT-CTA HEAD WITH & W/O-POST PROCESS; Future  - CT-CTA NECK WITH & W/O-POST PROCESSING; Future      Presumed Mechanism by TOAST  __  Large-artery atherosclerosis  __  Cardioembolism  __  Small-vessel occlusion  __  Stroke of other determined etiology   _x_  Stroke of undetermined etiology     Antithrombotic: ASA 81mg daily  Blood pressure goal: < 140/90  LDL goal: < 70     I have counseled the patient on smoking cessation.     This is a 72-year-old male smoker, with right MCA territory ischemic infarction.  He has no residual symptoms from his ischemic stroke.  Given his history and the appearance of the infarct on the MRI, suspicion is high for a atherothrombotic intracranial right MCA stenosis.  I have ordered a CT angiogram of the head and neck to confirm.  I have also ordered a cardiac event monitor for atrial fibrillation.  He will follow-up in 3 months after the duration of the test results.  If there is intracranial atherosclerosis, he will start dual antiplatelet therapy.     FOLLOW-UP:   Return in about 3 months (around 7/21/2023) for test results .    Total time spent for the day for this patient unrelated to procedure time is: 30 minutes. I spent 22 minutes in face to face time and I spent 4 minutes pre-charting and 4 minutes in post-visit documentation.      Dr. Len Barba D.O.  [facility]

## 2023-04-21 NOTE — PATIENT INSTRUCTIONS
Continue aspirin 81mg for now every day   Continue the rosuvastatin 40mg. Your LDL cholesterol is at goal so further drugs are indicated for cholesterol control.   Stop smoking.   Do regular exercise - moderate intensity 4 days per week 30 minutes per day.     I have ordered CT scans as well as a cardiac event monitor

## 2023-04-25 ENCOUNTER — TELEPHONE (OUTPATIENT)
Dept: HEALTH INFORMATION MANAGEMENT | Facility: OTHER | Age: 73
End: 2023-04-25
Payer: MEDICARE

## 2023-04-28 ENCOUNTER — OFFICE VISIT (OUTPATIENT)
Dept: MEDICAL GROUP | Facility: LAB | Age: 73
End: 2023-04-28
Payer: MEDICARE

## 2023-04-28 VITALS
DIASTOLIC BLOOD PRESSURE: 68 MMHG | SYSTOLIC BLOOD PRESSURE: 120 MMHG | TEMPERATURE: 96.8 F | HEIGHT: 69 IN | BODY MASS INDEX: 22.81 KG/M2 | HEART RATE: 68 BPM | WEIGHT: 154 LBS | OXYGEN SATURATION: 97 %

## 2023-04-28 DIAGNOSIS — N18.31 STAGE 3A CHRONIC KIDNEY DISEASE: ICD-10-CM

## 2023-04-28 DIAGNOSIS — I10 PRIMARY HYPERTENSION: ICD-10-CM

## 2023-04-28 DIAGNOSIS — F17.200 SMOKER: ICD-10-CM

## 2023-04-28 DIAGNOSIS — I63.511 CEREBROVASCULAR ACCIDENT (CVA) DUE TO OCCLUSION OF RIGHT MIDDLE CEREBRAL ARTERY (HCC): ICD-10-CM

## 2023-04-28 PROCEDURE — 99214 OFFICE O/P EST MOD 30 MIN: CPT | Performed by: FAMILY MEDICINE

## 2023-04-28 ASSESSMENT — FIBROSIS 4 INDEX: FIB4 SCORE: 1.99

## 2023-04-28 NOTE — PROGRESS NOTES
Chief Complaint:   Chief Complaint   Patient presents with    Follow-Up       HPI: Established patient  Deacon Pulido is a 72 y.o. male who presents for follow-up, discussed the following today:    1. Primary hypertension  Chronic, continues to take medications as directed, blood pressure at the office 120/68, asymptomatic    2. Stage 3a chronic kidney disease   Chronic, stable, recently established with nephrology, no other concerns at this time.  Reviewed records from nephrology    3. Cerebrovascular accident (CVA) due to occlusion of right middle cerebral artery (HCC)  New diagnosis, already established with neurology, patient on atorvastatin and aspirin.  Established with cardiology to evaluate possible arrhythmia, he is still on heart monitor.  Denies concerns at this time except for still numbness on the right upper extremity.    4. Smoker  Ready to quit, declines medications at this time, he is trying on his own.  He has nicotine patches, still smoking on and off.        Past medical history, family history, social history and medications reviewed and updated in the record.  Today  Current medications, problem list and allergies reviewed in EPIC today  Health maintenance topics are reviewed and updated.    Patient Active Problem List    Diagnosis Date Noted    Encounter to establish care with new doctor 10/28/2021    Cerebrovascular accident (CVA) due to occlusion of right middle cerebral artery (HCC) 03/30/2023    Lung nodules 11/30/2021    Right kidney mass 11/19/2021    Weight loss 11/19/2021    Prediabetes 10/28/2021    Stage 3 chronic kidney disease (HCC) 06/30/2020    SK (seborrheic keratosis) 06/30/2020    Multiple actinic keratoses 02/28/2019    NAFL (nonalcoholic fatty liver) 03/30/2018    Cervical radiculopathy 02/16/2018    Encounter for screening for lung cancer 05/09/2017    Coronary atherosclerosis due to calcified coronary lesion 12/14/2016    Mixed hyperlipidemia with apolipoprotein E4  variant 09/17/2014    Metabolic syndrome 09/17/2014    Atherosclerosis of both carotid arteries 08/11/2014    Abnormal chest CT 08/11/2014    Screening for osteoporosis 08/07/2014    Primary osteoarthritis of right knee 10/22/2013    Atherosclerosis of aorta (HCC) 10/22/2013    HTN (hypertension) 10/22/2013    Tobacco use 10/22/2013    History of kidney stones      Family History   Problem Relation Age of Onset    Asthma Mother     Heart Attack Mother     Hypertension Father     Stroke Father     Dementia Father     Lung Disease Father         heavy smoker    Other Brother         Morbid Obesity    Alcohol/Drug Brother         prescription drug problem    Alcohol/Drug Brother         street drugs    Cancer Paternal Uncle         Pancreatic    Colon Cancer Cousin     Cancer Paternal Aunt         colon ca      Social History     Socioeconomic History    Marital status:      Spouse name: Not on file    Number of children: Not on file    Years of education: Not on file    Highest education level: Not on file   Occupational History    Not on file   Tobacco Use    Smoking status: Every Day     Packs/day: 1.00     Years: 50.00     Pack years: 50.00     Types: Cigarettes    Smokeless tobacco: Never    Tobacco comments:     05/09/17 -- currently 1/2 pack per day   Vaping Use    Vaping Use: Not on file   Substance and Sexual Activity    Alcohol use: Not Currently     Alcohol/week: 0.0 oz     Comment: 1x/month, 1 drink per time    Drug use: No    Sexual activity: Yes     Partners: Female     Birth control/protection: Post-Menopausal     Comment: ,  multiple companies   Other Topics Concern    Not on file   Social History Narrative    Not on file     Social Determinants of Health     Financial Resource Strain: Not on file   Food Insecurity: Not on file   Transportation Needs: Not on file   Physical Activity: Not on file   Stress: Not on file   Social Connections: Not on file   Intimate Partner Violence: Not on  file   Housing Stability: Not on file     Current Outpatient Medications   Medication Sig Dispense Refill    aspirin EC (ECOTRIN) 81 MG Tablet Delayed Response Take 1 Tablet by mouth every day. 100 Tablet 3    rosuvastatin (CRESTOR) 40 MG tablet Take 1 Tablet by mouth every day. 30 Tablet 3    nicotine (NICODERM) 14 MG/24HR PATCH 24 HR Place 1 Patch on the skin every 24 hours. (Patient not taking: Reported on 4/21/2023) 30 Patch 1    clotrimazole-betamethasone (LOTRISONE) 1-0.05 % Cream Apply 1 Application topically 2 times a day. 45 g 0    betamethasone dipropionate (DIPROLENE) 0.05 % Ointment Apply 1 Application topically 2 times a day. 45 g 0    doxycycline (MONODOX) 100 MG capsule Take 1 Capsule by mouth 2 times a day. (Patient not taking: Reported on 4/5/2023) 14 Capsule 0    methylPREDNISolone (MEDROL DOSEPAK) 4 MG Tablet Therapy Pack As directed on the packaging label. (Patient not taking: Reported on 4/5/2023) 21 Tablet 0    fluticasone (FLONASE) 50 MCG/ACT nasal spray Administer 1 Spray into affected nostril(S) every day. 16 g 1    losartan (COZAAR) 50 MG Tab Take 1 Tablet by mouth every day. 90 Tablet 3    ezetimibe (ZETIA) 10 MG Tab Take 1 Tablet by mouth every day. 90 Tablet 3    amLODIPine (NORVASC) 10 MG Tab Take 1 Tablet by mouth every day. 90 Tablet 3    ketoconazole (NIZORAL) 2 % Cream       betamethasone dipropionate 0.05 % Cream Apply 1 Application topically 2 times a day. 45 g 3    folic acid (FOLVITE) 1 MG Tab Take 1 Tablet by mouth every day. (Patient not taking: Reported on 4/5/2023)      beclomethasone (QVAR) 40 MCG/ACT inhaler Inhale 1 Puff by mouth 2 Times a Day. 3 Inhaler 3    albuterol 108 (90 Base) MCG/ACT Aero Soln inhalation aerosol Inhale 2 Puffs by mouth every 6 hours as needed for Shortness of Breath. 8.5 g 12    Cholecalciferol (VITAMIN D) 2000 UNITS Cap Take 1 Capsule by mouth every day.      Cyanocobalamin (B-12) 1000 MCG TBCR Take 1 Each by mouth every day. (Patient not taking:  "Reported on 4/5/2023)       No current facility-administered medications for this visit.          Review Of Systems  As documented in HPI above  PHYSICAL EXAMINATION:    /68   Pulse 68   Temp 36 °C (96.8 °F) (Temporal)   Ht 1.753 m (5' 9\")   Wt 69.9 kg (154 lb)   SpO2 97%   BMI 22.74 kg/m²   Gen.: Well-developed, well-nourished, no apparent distress, pleasant and cooperative with the examination  HEENT: Normocephalic/atraumatic,   Neck: No JVD or bruits, no adenopathy  Cor: Regular rate and rhythm without murmur gallop or rub  Lungs: Clear to auscultation with equal breath sounds bilaterally. No wheezes, rhonchi.  Abdomen: Soft nontender without hepatosplenomegaly or masses appreciated, normoactive bowel sounds  Extremities: No cyanosis, clubbing or edema       ASSESSMENT/Plan:  1. Primary hypertension  Chronic well-controlled continue current regimen goal to keep it below 140/90      2. Stage 3a chronic kidney disease (HCC)  Chronic, stable, continue well hydration and avoid NSAIDs, continue follow-up with nephrology regularly to monitor kidney function test      3. Cerebrovascular accident (CVA) due to occlusion of right middle cerebral artery (HCC)  New event, diagnosed last visit, established with neurology, continue follow-up with cardiology.  Continue aspirin and atorvastatin for now.      4. Smoker  Counseled strongly to stop smoking completely.  Patient is trying his best, has nicotine patches        Please note that this dictation was created using voice recognition software. I have made every reasonable attempt to correct obvious errors but there may be errors of grammar and content that I may have overlooked prior to finalization of this note.        "

## 2023-05-03 ENCOUNTER — HOSPITAL ENCOUNTER (OUTPATIENT)
Dept: RADIOLOGY | Facility: MEDICAL CENTER | Age: 73
End: 2023-05-03
Attending: PSYCHIATRY & NEUROLOGY
Payer: MEDICARE

## 2023-05-03 DIAGNOSIS — Z86.73 HISTORY OF ISCHEMIC RIGHT MCA STROKE: ICD-10-CM

## 2023-05-03 PROCEDURE — 70498 CT ANGIOGRAPHY NECK: CPT

## 2023-05-03 PROCEDURE — 700117 HCHG RX CONTRAST REV CODE 255: Performed by: PSYCHIATRY & NEUROLOGY

## 2023-05-03 PROCEDURE — 70496 CT ANGIOGRAPHY HEAD: CPT

## 2023-05-03 RX ADMIN — IOHEXOL 50 ML: 350 INJECTION, SOLUTION INTRAVENOUS at 13:36

## 2023-05-03 RX ADMIN — IOHEXOL 50 ML: 350 INJECTION, SOLUTION INTRAVENOUS at 13:37

## 2023-05-04 ENCOUNTER — TELEPHONE (OUTPATIENT)
Dept: CARDIOLOGY | Facility: MEDICAL CENTER | Age: 73
End: 2023-05-04
Payer: MEDICARE

## 2023-05-04 NOTE — TELEPHONE ENCOUNTER
Called Pt to see if he wanted to F/U w/ card after monitor. Pt said he has a cardiologist and will also F/U w/ Dr. Barba. SABRINA

## 2023-05-10 ENCOUNTER — TELEPHONE (OUTPATIENT)
Dept: CARDIOLOGY | Facility: MEDICAL CENTER | Age: 73
End: 2023-05-10
Payer: MEDICARE

## 2023-05-10 DIAGNOSIS — Z86.73 HISTORY OF ISCHEMIC RIGHT MCA STROKE: ICD-10-CM

## 2023-05-10 NOTE — TELEPHONE ENCOUNTER
Lew Patch EOS Tracings to Thursdays ADD JUMANA's nurse Bhaskar on 5/10/2023    
To: MK    Please read, sign and advise as ADD. Thank you  
4 weeks

## 2023-05-11 PROCEDURE — 93248 EXT ECG>7D<15D REV&INTERPJ: CPT | Performed by: INTERNAL MEDICINE

## 2023-06-27 ENCOUNTER — OFFICE VISIT (OUTPATIENT)
Dept: MEDICAL GROUP | Facility: LAB | Age: 73
End: 2023-06-27
Payer: MEDICARE

## 2023-06-27 VITALS
HEART RATE: 64 BPM | RESPIRATION RATE: 16 BRPM | TEMPERATURE: 97.4 F | SYSTOLIC BLOOD PRESSURE: 110 MMHG | OXYGEN SATURATION: 97 % | HEIGHT: 69 IN | WEIGHT: 151 LBS | BODY MASS INDEX: 22.36 KG/M2 | DIASTOLIC BLOOD PRESSURE: 70 MMHG

## 2023-06-27 DIAGNOSIS — Z01.818 PREOPERATIVE CLEARANCE: ICD-10-CM

## 2023-06-27 DIAGNOSIS — F17.200 SMOKING: ICD-10-CM

## 2023-06-27 DIAGNOSIS — R09.81 SINUS CONGESTION: ICD-10-CM

## 2023-06-27 PROCEDURE — 3074F SYST BP LT 130 MM HG: CPT | Performed by: FAMILY MEDICINE

## 2023-06-27 PROCEDURE — 3078F DIAST BP <80 MM HG: CPT | Performed by: FAMILY MEDICINE

## 2023-06-27 PROCEDURE — 99214 OFFICE O/P EST MOD 30 MIN: CPT | Performed by: FAMILY MEDICINE

## 2023-06-27 RX ORDER — SULFAMETHOXAZOLE AND TRIMETHOPRIM 800; 160 MG/1; MG/1
1 TABLET ORAL 2 TIMES DAILY
Qty: 14 TABLET | Refills: 0 | Status: SHIPPED
Start: 2023-06-27 | End: 2023-09-05

## 2023-06-27 ASSESSMENT — FIBROSIS 4 INDEX: FIB4 SCORE: 1.99

## 2023-06-27 NOTE — PROGRESS NOTES
Chief Complaint:   Chief Complaint   Patient presents with    Medical Clearance       HPI: Established patient  Deacon Pulido is a 72 y.o. male who presents for follow-up for preoperative clearance, discussed the following today:  1. Preoperative clearance  Scheduled for eye surgery that will require 1 hour of anesthesia.  Patient denies any symptoms like palpitations or chest pain or any other concerns, he is having mild sinus congestions without fever or headache or pressure sensation.  And mild cough, related to his chronic smoking.    2. Sinus congestion  Reports for the past 3 weeks has been having sinus congestion, reports no headache or fever.    3. Smoking  Continues to smoke but is trying to quit.  He said he will quit soon before his surgery of the eye than scheduled in couple of weeks          Past medical history, family history, social history and medications reviewed and updated in the record. Today   Current medications, problem list and aller Bactrim DS prescription [facility]  today   Health maintenance topics are reviewed and updated.    Patient Active Problem List    Diagnosis Date Noted    Encounter to establish care with new doctor 10/28/2021    Cerebrovascular accident (CVA) due to occlusion of right middle cerebral artery (HCC) 03/30/2023    Lung nodules 11/30/2021    Right kidney mass 11/19/2021    Weight loss 11/19/2021    Prediabetes 10/28/2021    Stage 3 chronic kidney disease (HCC) 06/30/2020    SK (seborrheic keratosis) 06/30/2020    Multiple actinic keratoses 02/28/2019    NAFL (nonalcoholic fatty liver) 03/30/2018    Cervical radiculopathy 02/16/2018    Encounter for screening for lung cancer 05/09/2017    Coronary atherosclerosis due to calcified coronary lesion 12/14/2016    Mixed hyperlipidemia with apolipoprotein E4 variant 09/17/2014    Metabolic syndrome 09/17/2014    Atherosclerosis of both carotid arteries 08/11/2014    Abnormal chest CT  08/11/2014    Screening for osteoporosis 08/07/2014    Primary osteoarthritis of right knee 10/22/2013    Atherosclerosis of aorta (HCC) 10/22/2013    HTN (hypertension) 10/22/2013    Tobacco use 10/22/2013    History of kidney stones      Family History   Problem Relation Age of Onset    Asthma Mother     Heart Attack Mother     Hypertension Father     Stroke Father     Dementia Father     Lung Disease Father         heavy smoker    Other Brother         Morbid Obesity    Alcohol/Drug Brother         prescription drug problem    Alcohol/Drug Brother         street drugs    Cancer Paternal Uncle         Pancreatic    Colon Cancer Cousin     Cancer Paternal Aunt         colon ca      Social History     Socioeconomic History    Marital status:      Spouse name: Not on file    Number of children: Not on file    Years of education: Not on file    Highest education level: Not on file   Occupational History    Not on file   Tobacco Use    Smoking status: Every Day     Packs/day: 1.00     Years: 50.00     Pack years: 50.00     Types: Cigarettes    Smokeless tobacco: Never    Tobacco comments:     05/09/17 -- currently 1/2 pack per day   Vaping Use    Vaping Use: Not on file   Substance and Sexual Activity    Alcohol use: Not Currently     Alcohol/week: 0.0 oz     Comment: 1x/month, 1 drink per time    Drug use: No    Sexual activity: Yes     Partners: Female     Birth control/protection: Post-Menopausal     Comment: ,  multiple companies   Other Topics Concern    Not on file   Social History Narrative    Not on file     Social Determinants of Health     Financial Resource Strain: Not on file   Food Insecurity: Not on file   Transportation Needs: Not on file   Physical Activity: Not on file   Stress: Not on file   Social Connections: Not on file   Intimate Partner Violence: Not on file   Housing Stability: Not on file     Current Outpatient Medications   Medication Sig Dispense Refill    aspirin EC (ECOTRIN)  81 MG Tablet Delayed Response Take 1 Tablet by mouth every day. 100 Tablet 3    rosuvastatin (CRESTOR) 40 MG tablet Take 1 Tablet by mouth every day. 30 Tablet 3    nicotine (NICODERM) 14 MG/24HR PATCH 24 HR Place 1 Patch on the skin every 24 hours. (Patient not taking: Reported on 4/21/2023) 30 Patch 1    clotrimazole-betamethasone (LOTRISONE) 1-0.05 % Cream Apply 1 Application topically 2 times a day. 45 g 0    betamethasone dipropionate (DIPROLENE) 0.05 % Ointment Apply 1 Application topically 2 times a day. 45 g 0    doxycycline (MONODOX) 100 MG capsule Take 1 Capsule by mouth 2 times a day. (Patient not taking: Reported on 4/5/2023) 14 Capsule 0    methylPREDNISolone (MEDROL DOSEPAK) 4 MG Tablet Therapy Pack As directed on the packaging label. (Patient not taking: Reported on 4/5/2023) 21 Tablet 0    fluticasone (FLONASE) 50 MCG/ACT nasal spray Administer 1 Spray into affected nostril(S) every day. 16 g 1    losartan (COZAAR) 50 MG Tab Take 1 Tablet by mouth every day. 90 Tablet 3    ezetimibe (ZETIA) 10 MG Tab Take 1 Tablet by mouth every day. 90 Tablet 3    amLODIPine (NORVASC) 10 MG Tab Take 1 Tablet by mouth every day. 90 Tablet 3    ketoconazole (NIZORAL) 2 % Cream       betamethasone dipropionate 0.05 % Cream Apply 1 Application topically 2 times a day. 45 g 3    folic acid (FOLVITE) 1 MG Tab Take 1 Tablet by mouth every day. (Patient not taking: Reported on 4/5/2023)      beclomethasone (QVAR) 40 MCG/ACT inhaler Inhale 1 Puff by mouth 2 Times a Day. 3 Inhaler 3    albuterol 108 (90 Base) MCG/ACT Aero Soln inhalation aerosol Inhale 2 Puffs by mouth every 6 hours as needed for Shortness of Breath. 8.5 g 12    Cholecalciferol (VITAMIN D) 2000 UNITS Cap Take 1 Capsule by mouth every day.      Cyanocobalamin (B-12) 1000 MCG TBCR Take 1 Each by mouth every day. (Patient not taking: Reported on 4/5/2023)       No current facility-administered medications for this visit.           Review Of Systems  As  "documented in HPI above  PHYSICAL EXAMINATION:    /70 (BP Location: Left arm, Patient Position: Sitting, BP Cuff Size: Adult)   Pulse 64   Temp 36.3 °C (97.4 °F) (Temporal)   Resp 16   Ht 1.753 m (5' 9\")   Wt 68.5 kg (151 lb)   SpO2 97%   BMI 22.30 kg/m²   Gen.: Well-developed, well-nourished, no apparent distress, pleasant and cooperative with the examination  HEENT: Normocephalic/atraumatic, sinuses nontender with palpation, TMs clear, nares patent with pink mucosa and clear rhinorrhea, oropharynx clear  Neck: No JVD or bruits, no adenopathy  Cor: Regular rate and rhythm without murmur gallop or rub  Lungs: Clear to auscultation with equal breath sounds bilaterally. No wheezes, rhonchi.  Abdomen: Soft nontender without hepatosplenomegaly or masses appreciated, normoactive bowel sounds  Extremities: No cyanosis, clubbing or edema       ASSESSMENT/Plan:  1. Preoperative clearance  Stable clinically no red flags, patient to do his labs, cleared for surgery, will fax paperwork to his provider      2. Sinus congestion  Patient to use Flonase, along with allergy medications over-the-counter, if not improved patient to start antibiotics Bactrim to make sure that he is not having an infection before his surgery.  Voices understanding and agrees with the plan    Bactrim antibiotics prescription sent to pharmacy      3. Smoking  Counseled strongly on smoking cessation as soon as possible.         Please note that this dictation was created using voice recognition software. I have made every reasonable attempt to correct obvious errors but there may be errors of grammar and content that I may have overlooked prior to finalization of this note.     "

## 2023-07-10 ENCOUNTER — TELEPHONE (OUTPATIENT)
Dept: NEUROLOGY | Facility: MEDICAL CENTER | Age: 73
End: 2023-07-10
Payer: MEDICARE

## 2023-07-21 ENCOUNTER — OFFICE VISIT (OUTPATIENT)
Dept: NEUROLOGY | Facility: MEDICAL CENTER | Age: 73
End: 2023-07-21
Attending: PSYCHIATRY & NEUROLOGY
Payer: MEDICARE

## 2023-07-21 VITALS
TEMPERATURE: 97.5 F | SYSTOLIC BLOOD PRESSURE: 112 MMHG | RESPIRATION RATE: 16 BRPM | HEART RATE: 62 BPM | WEIGHT: 151.01 LBS | OXYGEN SATURATION: 98 % | DIASTOLIC BLOOD PRESSURE: 64 MMHG | BODY MASS INDEX: 22.37 KG/M2 | HEIGHT: 69 IN

## 2023-07-21 DIAGNOSIS — Z86.73 HISTORY OF EMBOLIC STROKE: ICD-10-CM

## 2023-07-21 DIAGNOSIS — F17.211 NICOTINE DEPENDENCE, CIGARETTES, IN REMISSION: ICD-10-CM

## 2023-07-21 PROBLEM — I63.511 CEREBROVASCULAR ACCIDENT (CVA) DUE TO OCCLUSION OF RIGHT MIDDLE CEREBRAL ARTERY (HCC): Status: RESOLVED | Noted: 2023-03-30 | Resolved: 2023-07-21

## 2023-07-21 PROCEDURE — 99212 OFFICE O/P EST SF 10 MIN: CPT | Performed by: PSYCHIATRY & NEUROLOGY

## 2023-07-21 PROCEDURE — 99214 OFFICE O/P EST MOD 30 MIN: CPT | Performed by: PSYCHIATRY & NEUROLOGY

## 2023-07-21 PROCEDURE — 3078F DIAST BP <80 MM HG: CPT | Performed by: PSYCHIATRY & NEUROLOGY

## 2023-07-21 PROCEDURE — 3074F SYST BP LT 130 MM HG: CPT | Performed by: PSYCHIATRY & NEUROLOGY

## 2023-07-21 ASSESSMENT — FIBROSIS 4 INDEX: FIB4 SCORE: 1.99

## 2023-07-21 NOTE — PROGRESS NOTES
Chief Complaint   Patient presents with    Follow-Up     History of ischemic MCA stroke       History of present illness:  Deacon Pulido 72 y.o. male with presents to stroke bridge clinic. This patient presented with L arm numbness/tingling on 3/28 and had a brain MRI that demonstrated R MCA territory infarcts.   His numbness is improved. He had weakness of the left arm for 4-5 days that resolved, and now is back to normal. This occurred in late March.   He smokes half a pack daily but is working on quitting.  His PCP increased his rosuvastatin to 40mg and resumed him on ASA 81.       Past medical history:   Past Medical History:   Diagnosis Date    Allergy     Cervical radiculopathy 2/16/2018    Family history of dementia     Family history of hypertension     Family history of stroke     Hypertension     Kidney stones     Multiple actinic keratoses 2/28/2019    NAFL (nonalcoholic fatty liver) 3/30/2018    Dx on CT Chest 2/24/18    SK (seborrheic keratosis) 6/30/2020    Stage 3 chronic kidney disease (HCC) 6/30/2020    Tobacco use 10/22/2013    Type 2 diabetes mellitus without complication, without long-term current use of insulin (HCC) 9/17/2014       Past surgical history:   Past Surgical History:   Procedure Laterality Date    ORIF, KNEE Right     X 3    OTHER ORTHOPEDIC SURGERY Right     chronic dislocating shoulder    TONSILLECTOMY         Family history:   Family History   Problem Relation Age of Onset    Asthma Mother     Heart Attack Mother     Hypertension Father     Stroke Father     Dementia Father     Lung Disease Father         heavy smoker    Other Brother         Morbid Obesity    Alcohol/Drug Brother         prescription drug problem    Alcohol/Drug Brother         street drugs    Cancer Paternal Uncle         Pancreatic    Colon Cancer Cousin     Cancer Paternal Aunt         colon ca        Social history:   Social History     Socioeconomic History    Marital status:      Spouse name:  Not on file    Number of children: Not on file    Years of education: Not on file    Highest education level: Not on file   Occupational History    Not on file   Tobacco Use    Smoking status: Every Day     Packs/day: 1.00     Years: 50.00     Pack years: 50.00     Types: Cigarettes    Smokeless tobacco: Never    Tobacco comments:     05/09/17 -- currently 1/2 pack per day   Vaping Use    Vaping Use: Not on file   Substance and Sexual Activity    Alcohol use: Not Currently     Alcohol/week: 0.0 oz     Comment: 1x/month, 1 drink per time    Drug use: No    Sexual activity: Yes     Partners: Female     Birth control/protection: Post-Menopausal     Comment: ,  multiple companies   Other Topics Concern    Not on file   Social History Narrative    Not on file     Social Determinants of Health     Financial Resource Strain: Not on file   Food Insecurity: Not on file   Transportation Needs: Not on file   Physical Activity: Not on file   Stress: Not on file   Social Connections: Not on file   Intimate Partner Violence: Not on file   Housing Stability: Not on file       Current medications:   Current Outpatient Medications   Medication    aspirin EC (ECOTRIN) 81 MG Tablet Delayed Response    rosuvastatin (CRESTOR) 40 MG tablet    clotrimazole-betamethasone (LOTRISONE) 1-0.05 % Cream    betamethasone dipropionate (DIPROLENE) 0.05 % Ointment    fluticasone (FLONASE) 50 MCG/ACT nasal spray    losartan (COZAAR) 50 MG Tab    ezetimibe (ZETIA) 10 MG Tab    amLODIPine (NORVASC) 10 MG Tab    ketoconazole (NIZORAL) 2 % Cream    beclomethasone (QVAR) 40 MCG/ACT inhaler    albuterol 108 (90 Base) MCG/ACT Aero Soln inhalation aerosol    Cholecalciferol (VITAMIN D) 2000 UNITS Cap    sulfamethoxazole-trimethoprim (BACTRIM DS) 800-160 MG tablet    nicotine (NICODERM) 14 MG/24HR PATCH 24 HR    doxycycline (MONODOX) 100 MG capsule    methylPREDNISolone (MEDROL DOSEPAK) 4 MG Tablet Therapy Pack    betamethasone dipropionate 0.05 %  "Cream    folic acid (FOLVITE) 1 MG Tab    Cyanocobalamin (B-12) 1000 MCG TBCR     No current facility-administered medications for this visit.       Medication Allergy:  Allergies   Allergen Reactions    Penicillamine Unspecified     high  Other reaction(s): Unspecified  high    Cephalexin     Cephalosporins     Peanut-Derived        Physical examination:   Vitals:    07/21/23 0807   BP: 112/64   BP Location: Left arm   Patient Position: Sitting   BP Cuff Size: Adult   Pulse: 62   Resp: 16   Temp: 36.4 °C (97.5 °F)   TempSrc: Temporal   SpO2: 98%   Weight: 68.5 kg (151 lb 0.2 oz)   Height: 1.753 m (5' 9\")       Labs:  I reviewed the following labs personally:  Lab Results   Component Value Date/Time    HBA1C 5.6 08/18/2022 10:59 AM      Lab Results   Component Value Date/Time    CHOLSTRLTOT 130 10/28/2021 1354    TRIGLYCERIDE 108 10/28/2021 1354    HDL 69 10/28/2021 1354    LDL 39 10/28/2021 1354         Imaging:  Cardiac event monitor    Findings:   Predominant rhythm was sinus rhythm with an average heart rate of 71 bpm.   There were no episodes of ventricular tachycardia.   There were no episodes atrial fibrillation.   There was 1 episode of supraventricular tachycardia that lasted 5 beats in duration.   No significant pauses or high-grade AV block.   There were rare premature atrial complexes <1.0%   There were rare premature ventricular complexes <1.0%   No patient triggered events    CTA head/neck w/ and w/o   FINDINGS:  There is atherosclerotic plaque of the aorta.     Right common carotid artery: Patent     Right internal carotid artery: Atherosclerotic plaque without significant stenosis (less than 50%).     Left common carotid artery is patent.     Left internal carotid artery: Atherosclerotic plaque without significant stenosis (less than 50%).     The right vertebral artery is patent without dissection or stenosis.     The left vertebral artery is patent without dissection or stenosis.  LEFT dominant " vertebral artery.     Vertebrobasilar confluence: The vertebrobasilar confluence appears normal.     Lung apices are clear     The soft tissues of the neck are within normal limits.     3D angiographic/MIP images of the vasculature confirm the vascular findings as described above.     IMPRESSION:     No focal high-grade stenosis, dissection or occlusion of the cervical carotid or vertebral arteries.    FINDINGS:  The posterior circulation shows the distal vertebral arteries to be patent. Hypoplastic distal RIGHT vertebral artery.  The vertebrobasilar confluence is intact. The basilar artery is patent. No aneurysm or occlusive lesion is evident.     The anterior circulation shows no stenotic or occlusive lesion. No aneurysm is evident about the [place] of [place].     Lateral ventricles are moderately enlarged.  Cortical sulci are enlarged.  No significant mass effect or midline shift.  The basal cisterns are patent.  No evidence for intracranial hemorrhage.  The calvaria are intact.  The visualized paranasal sinuses are clear.  The visualized mastoids are unremarkable.  Calcifications of the carotid arteries noted.  The visualized orbits are unremarkable.     3D angiographic/MIP images of the vasculature confirm the vascular findings as described above.     IMPRESSION:     1.  No acute intracranial abnormality.  2.  Mild diffuse atrophy.  3.  No intracranial aneurysm, focal high-grade stenosis or abrupt large vessel cut off.    Carotid ultrasound  Findings   Right:   1. The common caortid is normal.   2. The external carotid is normal.   3. The internal carotid has mild, < 50% heterogenous plaque, proximally. 45% by diameter reduction measurement.   4. The vertebal is normal with antegrade flow.   5. The subclavian is normal.     Left:   1. The common caortid is normal.   2. The external carotid has mild, < 50% plaque, proximally.   3. The internal carotid has mild, < 50% heterogenous plaque at the bulb and  proximally.   4. The vertebal is normal with antegrade flow.   5. The subclavian is normal.     Conclusion   1. Mild < 50% plaque of bilateral proximal internal caortid arteries.   2. Mild, < 50% plaque of proximal left external carotid.   3. Normal vertebral and subclavian arteries.     Echocardiogram   Conclusion   1. Normal left ventricular cavity size with normal systolic function. No wall motion abnormalities. Normal ejection fraction 65-70% by Thakkar's Biplane. Grade 1 ( impaired relaxation) diastolic function. Left ventricular strain of -21.5%. Mild concentric hypertrophy.   2. Positive bubble study performed with more than 30 bubble crossing over to left heart with cough.   3. Aortic valve is mildly sclerotic without stenosis. The valve is trileaflet in structure.   4. Normal right heart.   5. Normal pulmonary artery pressures with RVSP of 32 mmHg.   6. No parasternal views, due to lung artifact.    MRI BRAIN w/o  I reviewed the images personally and agree with the following read:      IMPRESSION:     1.  Mild cerebral atrophy. Age-appropriate.  2.  Minimal supratentorial white matter disease most consistent with microvascular ischemic change.  3.  Multiple small punctate and tiny gyriform scattered foci of bright diffusion signal over the right frontal convexity and right parietal convexity consistent with acute infarcts in the territory of distal right MCA branches.       ASSESSMENT AND PLAN:  Problem List Items Addressed This Visit       History of embolic stroke       1. History of embolic stroke    72-year-old male with history of multiple scattered embolic strokes affecting the right middle cerebral artery territory.  This came to his attention after he developed left arm weakness in March 2023.  The left arm weakness is resolved.  The work-up for etiologies of the stroke have failed to identify a cause of the stroke.  CT angiogram of the head and neck demonstrated a less than 50% stenosis of the  bilateral internal carotid arteries, with normal middle cerebral arteries.  He has a positive bubble study with a right to left shunt on the echocardiogram, as well as a normal 14-day cardiac monitor.  Prior to the stroke he was not on aspirin, therefore he has restarted this.  His lipid panel is normal.  I have counseled him on the possible etiologies of stroke, including paroxysmal atrial fibrillation or a less than 50% stenosis of the right internal carotid artery. Given that the stroke remains cryptogenic, I would like for him to pursue a loop recorder.  He has a cardiologist in [place] who he will follow-up with to pursue this.  He has quit smoking.     FOLLOW-UP:   No follow-ups on file.    NATALIE RabagoO.  [facility]

## 2023-08-01 DIAGNOSIS — E78.5 HYPERLIPIDEMIA, UNSPECIFIED HYPERLIPIDEMIA TYPE: ICD-10-CM

## 2023-08-01 DIAGNOSIS — I63.511 CEREBROVASCULAR ACCIDENT (CVA) DUE TO OCCLUSION OF RIGHT MIDDLE CEREBRAL ARTERY (HCC): ICD-10-CM

## 2023-08-02 NOTE — TELEPHONE ENCOUNTER
Received request via: Pharmacy    Was the patient seen in the last year in this department? Yes  6/27/23  Does the patient have an active prescription (recently filled or refills available) for medication(s) requested? No    Does the patient have skilled nursing Plus and need 100 day supply (blood pressure, diabetes and cholesterol meds only)? Medication is not for cholesterol, blood pressure or diabetes

## 2023-08-04 RX ORDER — ROSUVASTATIN CALCIUM 40 MG/1
40 TABLET, COATED ORAL DAILY
Qty: 30 TABLET | Refills: 3 | Status: SHIPPED
Start: 2023-08-04 | End: 2023-09-05

## 2023-09-05 ENCOUNTER — OFFICE VISIT (OUTPATIENT)
Dept: MEDICAL GROUP | Facility: LAB | Age: 73
End: 2023-09-05
Payer: MEDICARE

## 2023-09-05 VITALS
DIASTOLIC BLOOD PRESSURE: 60 MMHG | HEART RATE: 62 BPM | RESPIRATION RATE: 16 BRPM | SYSTOLIC BLOOD PRESSURE: 120 MMHG | WEIGHT: 161 LBS | OXYGEN SATURATION: 96 % | TEMPERATURE: 97.7 F | BODY MASS INDEX: 23.85 KG/M2 | HEIGHT: 69 IN

## 2023-09-05 DIAGNOSIS — I63.511 CEREBROVASCULAR ACCIDENT (CVA) DUE TO OCCLUSION OF RIGHT MIDDLE CEREBRAL ARTERY (HCC): ICD-10-CM

## 2023-09-05 DIAGNOSIS — R73.03 PREDIABETES: ICD-10-CM

## 2023-09-05 DIAGNOSIS — Z23 NEED FOR TDAP VACCINATION: ICD-10-CM

## 2023-09-05 DIAGNOSIS — F17.200 SMOKER: ICD-10-CM

## 2023-09-05 DIAGNOSIS — D69.6 THROMBOCYTOPENIA, UNSPECIFIED (HCC): ICD-10-CM

## 2023-09-05 DIAGNOSIS — E78.5 HYPERLIPIDEMIA, UNSPECIFIED HYPERLIPIDEMIA TYPE: ICD-10-CM

## 2023-09-05 DIAGNOSIS — I10 PRIMARY HYPERTENSION: ICD-10-CM

## 2023-09-05 PROBLEM — E78.2 MIXED HYPERLIPIDEMIA: Status: ACTIVE | Noted: 2023-04-05

## 2023-09-05 PROBLEM — I63.9 CEREBROVASCULAR ACCIDENT (CVA) DUE TO VASCULAR OCCLUSION (HCC): Status: ACTIVE | Noted: 2023-04-05

## 2023-09-05 LAB
HBA1C MFR BLD: 5.7 % (ref ?–5.8)
POCT INT CON NEG: NEGATIVE
POCT INT CON POS: POSITIVE

## 2023-09-05 PROCEDURE — 99214 OFFICE O/P EST MOD 30 MIN: CPT | Performed by: FAMILY MEDICINE

## 2023-09-05 PROCEDURE — 3078F DIAST BP <80 MM HG: CPT | Performed by: FAMILY MEDICINE

## 2023-09-05 PROCEDURE — 83036 HEMOGLOBIN GLYCOSYLATED A1C: CPT | Performed by: FAMILY MEDICINE

## 2023-09-05 PROCEDURE — 3074F SYST BP LT 130 MM HG: CPT | Performed by: FAMILY MEDICINE

## 2023-09-05 RX ORDER — PREDNISOLONE ACETATE 10 MG/ML
SUSPENSION/ DROPS OPHTHALMIC
COMMUNITY
Start: 2023-06-23 | End: 2023-09-05

## 2023-09-05 RX ORDER — ROSUVASTATIN CALCIUM 20 MG/1
20 TABLET, COATED ORAL DAILY
COMMUNITY
Start: 2023-08-03 | End: 2023-09-05

## 2023-09-05 RX ORDER — TOBRAMYCIN 3 MG/ML
SOLUTION/ DROPS OPHTHALMIC
COMMUNITY
Start: 2023-06-23 | End: 2023-09-05

## 2023-09-05 RX ORDER — ROSUVASTATIN CALCIUM 20 MG/1
TABLET, COATED ORAL
COMMUNITY
Start: 2023-08-03

## 2023-09-05 ASSESSMENT — FIBROSIS 4 INDEX: FIB4 SCORE: 2.02

## 2023-09-05 NOTE — PROGRESS NOTES
Chief Complaint:   Chief Complaint   Patient presents with    Follow-Up     3 mo fv       HPI:Established patient   Deacon Pulido is a 73 y.o.male who presents for follow-up, discussed the following today:    1. Thrombocytopenia, unspecified   Resolved, asymptomatic most recent labs shows no evidence of low platelets.    2. Primary hypertension  Chronic well-controlled blood pressure today 120/60 continues to take medications without reported side effects    3. Smoker  Congratulated today on smoking cessation.  Doing well no concerns, still continues to use the patch sometimes    4. Hyperlipidemia, unspecified hyperlipidemia type    Chronic on statins rosuvastatin 20 mg and Zetia, no concerns denies side effects.  5. Cerebrovascular accident (CVA) due to occlusion of right middle cerebral artery   Doing well taking aspirin and cholesterol medications continues to follow-up with neurology reviewed most recent notes, no concerns    6. Need for Tdap vaccination  Patient insurance will not cover it here since he has he has Medicare, patient to get it from an outside pharmacy    7. Prediabetes    History of elevated A1c, will luther recheck it at the office today.        Past medical history, family history, social history and medications reviewed and updated in the record.  Today  Current medications, problem list and allergies reviewed in [facility] today  Health maintenance topics are reviewed and updated.    Patient Active Problem List    Diagnosis Date Noted    Encounter to establish care with new doctor 10/28/2021    Thrombocytopenia, unspecified (HCC) 09/05/2023    History of embolic stroke 07/21/2023    Nicotine dependence, in remission (Quit within 6 mos) 07/21/2023    Lung nodules 11/30/2021    Right kidney mass 11/19/2021    Weight loss 11/19/2021    Prediabetes 10/28/2021    Stage 3 chronic kidney disease (HCC) 06/30/2020    SK (seborrheic keratosis) 06/30/2020    Multiple actinic keratoses 02/28/2019    NAFL  (nonalcoholic fatty liver) 03/30/2018    Cervical radiculopathy 02/16/2018    Encounter for screening for lung cancer 05/09/2017    Coronary atherosclerosis due to calcified coronary lesion 12/14/2016    Mixed hyperlipidemia with apolipoprotein E4 variant 09/17/2014    Metabolic syndrome 09/17/2014    Atherosclerosis of both carotid arteries 08/11/2014    Abnormal chest CT 08/11/2014    Screening for osteoporosis 08/07/2014    Primary osteoarthritis of right knee 10/22/2013    Atherosclerosis of aorta (HCC) 10/22/2013    HTN (hypertension) 10/22/2013    Tobacco use 10/22/2013    History of kidney stones      Family History   Problem Relation Age of Onset    Asthma Mother     Heart Attack Mother     Hypertension Father     Stroke Father     Dementia Father     Lung Disease Father         heavy smoker    Other Brother         Morbid Obesity    Alcohol/Drug Brother         prescription drug problem    Alcohol/Drug Brother         street drugs    Cancer Paternal Uncle         Pancreatic    Colon Cancer Cousin     Cancer Paternal Aunt         colon ca      Social History     Socioeconomic History    Marital status:      Spouse name: Not on file    Number of children: Not on file    Years of education: Not on file    Highest education level: Not on file   Occupational History    Not on file   Tobacco Use    Smoking status: Every Day     Current packs/day: 1.00     Average packs/day: 1 pack/day for 50.0 years (50.0 ttl pk-yrs)     Types: Cigarettes    Smokeless tobacco: Never    Tobacco comments:     05/09/17 -- currently 1/2 pack per day   Vaping Use    Vaping Use: Not on file   Substance and Sexual Activity    Alcohol use: Not Currently     Alcohol/week: 0.0 oz     Comment: 1x/month, 1 drink per time    Drug use: No    Sexual activity: Yes     Partners: Female     Birth control/protection: Post-Menopausal     Comment: ,  multiple companies   Other Topics Concern    Not on file   Social History Narrative     Not on file     Social Determinants of Health     Financial Resource Strain: Not on file   Food Insecurity: Not on file   Transportation Needs: Not on file   Physical Activity: Not on file   Stress: Not on file   Social Connections: Not on file   Intimate Partner Violence: Not on file   Housing Stability: Not on file       Current Outpatient Medications   Medication Sig Dispense Refill    rosuvastatin (CRESTOR) 20 MG Tab       aspirin EC (ECOTRIN) 81 MG Tablet Delayed Response Take 1 Tablet by mouth every day. 100 Tablet 3    nicotine (NICODERM) 14 MG/24HR PATCH 24 HR Place 1 Patch on the skin every 24 hours. 30 Patch 1    clotrimazole-betamethasone (LOTRISONE) 1-0.05 % Cream Apply 1 Application topically 2 times a day. 45 g 0    betamethasone dipropionate (DIPROLENE) 0.05 % Ointment Apply 1 Application topically 2 times a day. 45 g 0    doxycycline (MONODOX) 100 MG capsule Take 1 Capsule by mouth 2 times a day. 14 Capsule 0    methylPREDNISolone (MEDROL DOSEPAK) 4 MG Tablet Therapy Pack As directed on the packaging label. 21 Tablet 0    fluticasone (FLONASE) 50 MCG/ACT nasal spray Administer 1 Spray into affected nostril(S) every day. 16 g 1    losartan (COZAAR) 50 MG Tab Take 1 Tablet by mouth every day. 90 Tablet 3    ezetimibe (ZETIA) 10 MG Tab Take 1 Tablet by mouth every day. 90 Tablet 3    amLODIPine (NORVASC) 10 MG Tab Take 1 Tablet by mouth every day. 90 Tablet 3    ketoconazole (NIZORAL) 2 % Cream       beclomethasone (QVAR) 40 MCG/ACT inhaler Inhale 1 Puff by mouth 2 Times a Day. 3 Inhaler 3    albuterol 108 (90 Base) MCG/ACT Aero Soln inhalation aerosol Inhale 2 Puffs by mouth every 6 hours as needed for Shortness of Breath. 8.5 g 12    Cholecalciferol (VITAMIN D) 2000 UNITS Cap Take 1 Capsule by mouth every day.       No current facility-administered medications for this visit.        Review Of Systems  As documented in HPI above  PHYSICAL EXAMINATION:    /60 (BP Location: Left arm, Patient  "Position: Sitting, BP Cuff Size: Adult)   Pulse 62   Temp 36.5 °C (97.7 °F) (Temporal)   Resp 16   Ht 1.753 m (5' 9\")   Wt 73 kg (161 lb)   SpO2 96%   BMI 23.78 kg/m²   Gen.: Well-developed, well-nourished, no apparent distress, pleasant and cooperative with the examination  HEENT: Normocephalic/atraumatic, sinuses nontender with palpation, TMs clear, nares patent with pink mucosa and clear rhinorrhea, oropharynx clear  Neck: No JVD or bruits, no adenopathy  Cor: Regular rate and rhythm without murmur gallop or rub  Lungs: Clear to auscultation with equal breath sounds bilaterally. No wheezes, rhonchi.  Abdomen: Soft nontender without hepatosplenomegaly or masses appreciated, normoactive bowel sounds  Extremities: No cyanosis, clubbing or edema       ASSESSMENT/Plan:  1. Thrombocytopenia, unspecified (HCC)  Resolved no concerns.       2. Primary hypertension  Chronic well-controlled continue current regimen      3. Smoker  Congratulated on smoking cessation, continue with nicotine patches as needed and directed      4. Hyperlipidemia, unspecified hyperlipidemia type  Chronic continue current regimen, stable      5. Cerebrovascular accident (CVA) due to occlusion of right middle cerebral artery (HCC)  Resolved at this time continue aspirin and cholesterol medication as directed.         Please note that this dictation was created using voice recognition software. I have made every reasonable attempt to correct obvious errors but there may be errors of grammar and content that I may have overlooked prior to finalization of this note.     "

## 2023-10-06 ENCOUNTER — HOSPITAL ENCOUNTER (OUTPATIENT)
Dept: LAB | Facility: MEDICAL CENTER | Age: 73
End: 2023-10-06
Attending: INTERNAL MEDICINE
Payer: MEDICARE

## 2023-10-06 DIAGNOSIS — R80.9 MICROALBUMINURIA: ICD-10-CM

## 2023-10-06 DIAGNOSIS — I10 PRIMARY HYPERTENSION: ICD-10-CM

## 2023-10-06 DIAGNOSIS — N18.31 STAGE 3A CHRONIC KIDNEY DISEASE: ICD-10-CM

## 2023-10-06 LAB
ANION GAP SERPL CALC-SCNC: 5 MMOL/L (ref 7–16)
BUN SERPL-MCNC: 24 MG/DL (ref 8–22)
CALCIUM SERPL-MCNC: 9.1 MG/DL (ref 8.5–10.5)
CHLORIDE SERPL-SCNC: 109 MMOL/L (ref 96–112)
CO2 SERPL-SCNC: 24 MMOL/L (ref 20–33)
CREAT SERPL-MCNC: 1.46 MG/DL (ref 0.5–1.4)
CREAT UR-MCNC: 105.66 MG/DL
GFR SERPLBLD CREATININE-BSD FMLA CKD-EPI: 50 ML/MIN/1.73 M 2
GLUCOSE SERPL-MCNC: 110 MG/DL (ref 65–99)
MICROALBUMIN UR-MCNC: <1.2 MG/DL
MICROALBUMIN/CREAT UR: NORMAL MG/G (ref 0–30)
POTASSIUM SERPL-SCNC: 5.3 MMOL/L (ref 3.6–5.5)
SODIUM SERPL-SCNC: 138 MMOL/L (ref 135–145)

## 2023-10-06 PROCEDURE — 80048 BASIC METABOLIC PNL TOTAL CA: CPT

## 2023-10-06 PROCEDURE — 82043 UR ALBUMIN QUANTITATIVE: CPT

## 2023-10-06 PROCEDURE — 36415 COLL VENOUS BLD VENIPUNCTURE: CPT

## 2023-10-06 PROCEDURE — 82570 ASSAY OF URINE CREATININE: CPT

## 2023-10-11 ENCOUNTER — OFFICE VISIT (OUTPATIENT)
Dept: NEPHROLOGY | Facility: MEDICAL CENTER | Age: 73
End: 2023-10-11
Payer: MEDICARE

## 2023-10-11 VITALS
HEART RATE: 60 BPM | BODY MASS INDEX: 24.44 KG/M2 | WEIGHT: 165 LBS | SYSTOLIC BLOOD PRESSURE: 120 MMHG | HEIGHT: 69 IN | DIASTOLIC BLOOD PRESSURE: 66 MMHG | TEMPERATURE: 97.6 F | OXYGEN SATURATION: 98 %

## 2023-10-11 DIAGNOSIS — N20.0 NEPHROLITHIASIS: ICD-10-CM

## 2023-10-11 DIAGNOSIS — D64.9 ANEMIA, UNSPECIFIED TYPE: ICD-10-CM

## 2023-10-11 DIAGNOSIS — N18.31 STAGE 3A CHRONIC KIDNEY DISEASE: ICD-10-CM

## 2023-10-11 DIAGNOSIS — R80.9 MICROALBUMINURIA: ICD-10-CM

## 2023-10-11 DIAGNOSIS — E55.9 VITAMIN D DEFICIENCY: ICD-10-CM

## 2023-10-11 DIAGNOSIS — I10 PRIMARY HYPERTENSION: ICD-10-CM

## 2023-10-11 PROCEDURE — 99213 OFFICE O/P EST LOW 20 MIN: CPT | Performed by: INTERNAL MEDICINE

## 2023-10-11 PROCEDURE — 3074F SYST BP LT 130 MM HG: CPT | Performed by: INTERNAL MEDICINE

## 2023-10-11 PROCEDURE — 3078F DIAST BP <80 MM HG: CPT | Performed by: INTERNAL MEDICINE

## 2023-10-11 ASSESSMENT — ENCOUNTER SYMPTOMS
VOMITING: 0
NAUSEA: 0
DIARRHEA: 0
SINUS PAIN: 0
WHEEZING: 0
EYES NEGATIVE: 1
HEMOPTYSIS: 0
FEVER: 0
PALPITATIONS: 0
CHILLS: 0
SHORTNESS OF BREATH: 0
FLANK PAIN: 0
ORTHOPNEA: 0
COUGH: 0
WEIGHT LOSS: 0
ABDOMINAL PAIN: 0

## 2023-10-11 ASSESSMENT — FIBROSIS 4 INDEX: FIB4 SCORE: 2.02

## 2023-10-11 NOTE — PROGRESS NOTES
Subjective     Deacon Pulido is a 73 y.o. male who presents with Follow-Up and Chronic Kidney Disease            Chronic Kidney Disease  Pertinent negatives include no abdominal pain, chest pain, chills, congestion, coughing, fever, nausea or vomiting.     Deacon is coming today for f/u of CKD IIIa  Doing well, no complaints  No dysuria/hematuria flank pain  Long term hx/of HTN  (+) nephrolithiasis -stable  CKD III a  -creat level stable at -baseline ( 1.3-1.4)  HTN: BP very well controlled    Review of Systems   Constitutional:  Negative for chills, fever, malaise/fatigue and weight loss.   HENT:  Negative for congestion, hearing loss and sinus pain.    Eyes: Negative.    Respiratory:  Negative for cough, hemoptysis, shortness of breath and wheezing.    Cardiovascular:  Negative for chest pain, palpitations, orthopnea and leg swelling.   Gastrointestinal:  Negative for abdominal pain, diarrhea, nausea and vomiting.   Genitourinary:  Negative for dysuria, flank pain, frequency, hematuria and urgency.   Skin: Negative.    All other systems reviewed and are negative.         Past Medical History:   Diagnosis Date    Allergy     Cervical radiculopathy 2/16/2018    Family history of dementia     Family history of hypertension     Family history of stroke     Hypertension     Kidney stones     Multiple actinic keratoses 2/28/2019    NAFL (nonalcoholic fatty liver) 3/30/2018    Dx on CT Chest 2/24/18    SK (seborrheic keratosis) 6/30/2020    Stage 3 chronic kidney disease (HCC) 6/30/2020    Tobacco use 10/22/2013    Type 2 diabetes mellitus without complication, without long-term current use of insulin (HCC) 9/17/2014       Family History   Problem Relation Age of Onset    Asthma Mother     Heart Attack Mother     Hypertension Father     Stroke Father     Dementia Father     Lung Disease Father         heavy smoker    Other Brother         Morbid Obesity    Alcohol/Drug Brother         prescription drug problem     "Alcohol/Drug Brother         street drugs    Cancer Paternal Uncle         Pancreatic    Colon Cancer Cousin     Cancer Paternal Aunt         colon ca        Social History     Socioeconomic History    Marital status:    Tobacco Use    Smoking status: Every Day     Current packs/day: 1.00     Average packs/day: 1 pack/day for 50.0 years (50.0 ttl pk-yrs)     Types: Cigarettes    Smokeless tobacco: Never    Tobacco comments:     05/09/17 -- currently 1/2 pack per day   Substance and Sexual Activity    Alcohol use: Not Currently     Alcohol/week: 0.0 oz     Comment: 1x/month, 1 drink per time    Drug use: No    Sexual activity: Yes     Partners: Female     Birth control/protection: Post-Menopausal     Comment: ,  multiple companies         Objective     /66 (BP Location: Right arm, Patient Position: Sitting, BP Cuff Size: Adult)   Pulse 60   Temp 36.4 °C (97.6 °F)   Ht 1.753 m (5' 9\")   Wt 74.8 kg (165 lb)   SpO2 98%   BMI 24.37 kg/m²      Physical Exam  Vitals reviewed.   Constitutional:       General: He is not in acute distress.     Appearance: Normal appearance. He is well-developed. He is not diaphoretic.   HENT:      Head: Normocephalic and atraumatic.      Nose: Nose normal.      Mouth/Throat:      Mouth: Mucous membranes are moist.      Pharynx: Oropharynx is clear.   Eyes:      Extraocular Movements: Extraocular movements intact.      Conjunctiva/sclera: Conjunctivae normal.      Pupils: Pupils are equal, round, and reactive to light.   Cardiovascular:      Rate and Rhythm: Normal rate and regular rhythm.      Pulses: Normal pulses.      Heart sounds: Normal heart sounds.   Pulmonary:      Effort: Pulmonary effort is normal. No respiratory distress.      Breath sounds: Normal breath sounds. No wheezing or rales.   Abdominal:      General: Bowel sounds are normal. There is no distension.      Palpations: Abdomen is soft. There is no mass.      Tenderness: There is no abdominal " tenderness. There is no right CVA tenderness or left CVA tenderness.   Musculoskeletal:      Cervical back: Normal range of motion and neck supple.      Right lower leg: No edema.      Left lower leg: No edema.   Skin:     General: Skin is warm.      Coloration: Skin is not pale.      Findings: No erythema or rash.   Neurological:      General: No focal deficit present.      Mental Status: He is alert and oriented to person, place, and time.      Cranial Nerves: No cranial nerve deficit.      Coordination: Coordination normal.   Psychiatric:         Mood and Affect: Mood normal.         Behavior: Behavior normal.         Thought Content: Thought content normal.         Judgment: Judgment normal.                    Laboratory results reviewed: d/w Pt   Latest Reference Range & Units 09/03/22 09:00 11/15/22 15:28 03/28/23 11:31 10/06/23 08:49   WBC 4.8 - 10.8 K/uL 8.5 8.1     RBC 4.70 - 6.10 M/uL 4.26 (L) 4.60 (L)     Hemoglobin 14.0 - 18.0 g/dL 14.0 14.8     Hematocrit 42.0 - 52.0 % 43.2 45.3     MCV 81.4 - 97.8 fL 101.4 (H) 98.5 (H)     MCH 27.0 - 33.0 pg 32.9 32.2     MCHC 33.7 - 35.3 g/dL 32.4 (L) 32.7 (L)     RDW 35.9 - 50.0 fL 50.0 45.7     Platelet Count 164 - 446 K/uL 163 (L) 169     MPV 9.0 - 12.9 fL 12.0 11.9     Neutrophils-Polys 44.00 - 72.00 %  66.20     Neutrophils (Absolute) 1.82 - 7.42 K/uL  5.35     Lymphocytes 22.00 - 41.00 %  20.00 (L)     Lymphs (Absolute) 1.00 - 4.80 K/uL  1.61     Monocytes 0.00 - 13.40 %  7.30     Monos (Absolute) 0.00 - 0.85 K/uL  0.59     Eosinophils 0.00 - 6.90 %  5.00     Eos (Absolute) 0.00 - 0.51 K/uL  0.40     Basophils 0.00 - 1.80 %  1.10     Baso (Absolute) 0.00 - 0.12 K/uL  0.09     Immature Granulocytes 0.00 - 0.90 %  0.40     Immature Granulocytes (abs) 0.00 - 0.11 K/uL  0.03     Nucleated RBC /100 WBC  0.00     NRBC (Absolute) K/uL  0.00     Sodium 135 - 145 mmol/L 138  140 138   Potassium 3.6 - 5.5 mmol/L 5.2  5.3 5.3   Chloride 96 - 112 mmol/L 105  104 109   Co2  20 - 33 mmol/L 24  23 24   Anion Gap 7.0 - 16.0  9.0  13.0 5.0 (L)   Glucose 65 - 99 mg/dL 116 (H)  108 (H) 110 (H)   Bun 8 - 22 mg/dL 22  27 (H) 24 (H)   Creatinine 0.50 - 1.40 mg/dL 1.33  1.41 (H) 1.46 (H)   GFR (CKD-EPI) >60 mL/min/1.73 m 2 56 !  52 ! 50 !   (L): Data is abnormally low  (   Latest Reference Range & Units 03/28/23 11:31   Pth, Intact 14.0 - 72.0 pg/mL 36.1   H): Data is abnormally high     Latest Reference Range & Units 03/28/23 11:31 09/05/23 09:48 10/06/23 08:49 10/06/23 08:50   Calcium 8.5 - 10.5 mg/dL 9.6  9.1    Glycohemoglobin 5.8 %  5.7     Micro Alb Creat Ratio 0 - 30 mg/g    see below   Creatinine, Urine mg/dL    105.66   Microalbumin, Urine Random mg/dL    <1.2     Assessment & Plan        1. Stage 3a chronic kidney disease (HCC)       Creat level stable at baseline-to monitor       Keep well hydrated    2. Primary hypertension      BP well controlled now -to monitor      3. Vitamin D deficiency      Well controlled --to monitor      4. Microalbuminuria      negative      5. Nephrolithiasis      left kidney stone - non obstructing      asymptomatoc    6. Anemia, unspecified type      Hb level stable -WNL            Recs:  Continue current treatment  Keep well hydrated  Monitor BP  Avoid NSAID's  Low salt diet  F/u in 6 months

## 2023-10-11 NOTE — PATIENT INSTRUCTIONS
Continue current treatment  Keep well hydrated  Low salt diet  Monitor BP and call clinic if BP > 135.85

## 2023-10-31 DIAGNOSIS — I10 ESSENTIAL HYPERTENSION: ICD-10-CM

## 2023-11-01 RX ORDER — AMLODIPINE BESYLATE 10 MG/1
10 TABLET ORAL
Qty: 90 TABLET | Refills: 3 | Status: SHIPPED | OUTPATIENT
Start: 2023-11-01 | End: 2023-11-02 | Stop reason: SDUPTHER

## 2023-11-01 NOTE — TELEPHONE ENCOUNTER
Received request via: Pharmacy    Was the patient seen in the last year in this department? Yes 9/5/2023    Does the patient have an active prescription (recently filled or refills available) for medication(s) requested? No    Does the patient have care home Plus and need 100 day supply (blood pressure, diabetes and cholesterol meds only)? Patient does not have SCP

## 2023-11-02 DIAGNOSIS — E78.2 MIXED HYPERLIPIDEMIA WITH APOLIPOPROTEIN E4 VARIANT: ICD-10-CM

## 2023-11-02 DIAGNOSIS — I10 ESSENTIAL HYPERTENSION: ICD-10-CM

## 2023-11-03 RX ORDER — EZETIMIBE 10 MG/1
10 TABLET ORAL
Qty: 90 TABLET | Refills: 3 | Status: SHIPPED | OUTPATIENT
Start: 2023-11-03

## 2023-11-03 RX ORDER — LOSARTAN POTASSIUM 50 MG/1
50 TABLET ORAL DAILY
Qty: 90 TABLET | Refills: 3 | Status: SHIPPED | OUTPATIENT
Start: 2023-11-03

## 2023-11-03 RX ORDER — AMLODIPINE BESYLATE 10 MG/1
10 TABLET ORAL
Qty: 90 TABLET | Refills: 3 | Status: SHIPPED | OUTPATIENT
Start: 2023-11-03

## 2023-11-03 NOTE — TELEPHONE ENCOUNTER
Received request via: Pharmacy    Was the patient seen in the last year in this department? Yes  9/5/23  Does the patient have an active prescription (recently filled or refills available) for medication(s) requested? No    Does the patient have senior living Plus and need 100 day supply (blood pressure, diabetes and cholesterol meds only)? Medication is not for cholesterol, blood pressure or diabetes

## 2023-11-29 ENCOUNTER — PATIENT MESSAGE (OUTPATIENT)
Dept: HEALTH INFORMATION MANAGEMENT | Facility: OTHER | Age: 73
End: 2023-11-29

## 2024-01-16 ENCOUNTER — HOSPITAL ENCOUNTER (OUTPATIENT)
Dept: LAB | Facility: MEDICAL CENTER | Age: 74
End: 2024-01-16
Attending: INTERNAL MEDICINE
Payer: MEDICARE

## 2024-01-16 LAB
ALBUMIN SERPL BCP-MCNC: 4.1 G/DL (ref 3.2–4.9)
ALBUMIN/GLOB SERPL: 1.6 G/DL
ALP SERPL-CCNC: 74 U/L (ref 30–99)
ALT SERPL-CCNC: 27 U/L (ref 2–50)
ANION GAP SERPL CALC-SCNC: 9 MMOL/L (ref 7–16)
AST SERPL-CCNC: 26 U/L (ref 12–45)
BILIRUB SERPL-MCNC: 0.3 MG/DL (ref 0.1–1.5)
BUN SERPL-MCNC: 23 MG/DL (ref 8–22)
CALCIUM ALBUM COR SERPL-MCNC: 8.9 MG/DL (ref 8.5–10.5)
CALCIUM SERPL-MCNC: 9 MG/DL (ref 8.5–10.5)
CHLORIDE SERPL-SCNC: 107 MMOL/L (ref 96–112)
CHOLEST SERPL-MCNC: 103 MG/DL (ref 100–199)
CO2 SERPL-SCNC: 24 MMOL/L (ref 20–33)
CREAT SERPL-MCNC: 1.56 MG/DL (ref 0.5–1.4)
FASTING STATUS PATIENT QL REPORTED: NORMAL
GFR SERPLBLD CREATININE-BSD FMLA CKD-EPI: 46 ML/MIN/1.73 M 2
GLOBULIN SER CALC-MCNC: 2.6 G/DL (ref 1.9–3.5)
GLUCOSE SERPL-MCNC: 112 MG/DL (ref 65–99)
HDLC SERPL-MCNC: 50 MG/DL
LDLC SERPL CALC-MCNC: 42 MG/DL
POTASSIUM SERPL-SCNC: 5.3 MMOL/L (ref 3.6–5.5)
PROT SERPL-MCNC: 6.7 G/DL (ref 6–8.2)
SODIUM SERPL-SCNC: 140 MMOL/L (ref 135–145)
TRIGL SERPL-MCNC: 57 MG/DL (ref 0–149)

## 2024-01-16 PROCEDURE — 80061 LIPID PANEL: CPT

## 2024-01-16 PROCEDURE — 36415 COLL VENOUS BLD VENIPUNCTURE: CPT

## 2024-01-16 PROCEDURE — 80053 COMPREHEN METABOLIC PANEL: CPT

## 2024-02-16 ENCOUNTER — PATIENT MESSAGE (OUTPATIENT)
Dept: HEALTH INFORMATION MANAGEMENT | Facility: OTHER | Age: 74
End: 2024-02-16

## 2024-03-05 ENCOUNTER — APPOINTMENT (OUTPATIENT)
Dept: MEDICAL GROUP | Facility: LAB | Age: 74
End: 2024-03-05
Payer: MEDICARE

## 2024-03-28 ENCOUNTER — OFFICE VISIT (OUTPATIENT)
Dept: MEDICAL GROUP | Facility: LAB | Age: 74
End: 2024-03-28
Payer: MEDICARE

## 2024-03-28 VITALS
HEIGHT: 69 IN | RESPIRATION RATE: 16 BRPM | SYSTOLIC BLOOD PRESSURE: 110 MMHG | DIASTOLIC BLOOD PRESSURE: 60 MMHG | OXYGEN SATURATION: 96 % | BODY MASS INDEX: 25.03 KG/M2 | HEART RATE: 60 BPM | TEMPERATURE: 98.2 F | WEIGHT: 169 LBS

## 2024-03-28 DIAGNOSIS — I70.0 ATHEROSCLEROSIS OF AORTA (HCC): ICD-10-CM

## 2024-03-28 DIAGNOSIS — R05.3 CHRONIC COUGH: ICD-10-CM

## 2024-03-28 DIAGNOSIS — N18.31 CHRONIC KIDNEY DISEASE, STAGE 3A: ICD-10-CM

## 2024-03-28 DIAGNOSIS — M25.512 LEFT SHOULDER PAIN, UNSPECIFIED CHRONICITY: ICD-10-CM

## 2024-03-28 DIAGNOSIS — R22.32 NODULE OF FINGER OF LEFT HAND: ICD-10-CM

## 2024-03-28 PROCEDURE — 99214 OFFICE O/P EST MOD 30 MIN: CPT | Performed by: FAMILY MEDICINE

## 2024-03-28 PROCEDURE — 3074F SYST BP LT 130 MM HG: CPT | Performed by: FAMILY MEDICINE

## 2024-03-28 PROCEDURE — 3078F DIAST BP <80 MM HG: CPT | Performed by: FAMILY MEDICINE

## 2024-03-28 RX ORDER — AZITHROMYCIN 500 MG/1
500 TABLET, FILM COATED ORAL DAILY
Qty: 6 TABLET | Refills: 0 | Status: SHIPPED | OUTPATIENT
Start: 2024-03-28

## 2024-03-28 RX ORDER — DUPILUMAB 300 MG/2ML
INJECTION, SOLUTION SUBCUTANEOUS ONCE
COMMUNITY

## 2024-03-28 ASSESSMENT — FIBROSIS 4 INDEX: FIB4 SCORE: 2.22

## 2024-03-28 NOTE — PROGRESS NOTES
Chief Complaint:   Chief Complaint   Patient presents with    Follow-Up    Shoulder Pain     Left     Hand Pain     right    Sinus Problem     Green phlegm       HPI: Established patient  Deacon Pulido is a 73 y.o. male who presents for follow-up, discussed the following today:    1. Left shoulder pain, unspecified chronicity  Chronic ongoing for the patient, new to me.  Pain has been going on for more than couple of months.  No history of direct trauma, patient is a longtime golfer denies sensory changes or tingling or numbness in the upper extremities.  No neck pain    2. Chronic cough  Chronic ongoing for the past 2 months, new to me reports a lot of sputum production that is green in color, denies fever, no shortness of breath or chest pain.  Patient quit smoking at this time for more than 6 months now.    3. Nodule of finger of left hand  New concern, noted a nodule on his left palm of the hand.  Denies pain or finger stiffness.  No trauma or injury    4. Atherosclerosis of aorta   Chronic stable asymptomatic no concerns    5. Chronic kidney disease, stage 3a   Chronic stable, patient has an appointment to establish with nephrologist          Past medical history, family history, social history and medications reviewed and updated in the record.   Current medications, problem list and allergies reviewed in [facility]  [employer] plate maintenance topics are reviewed and updated.    Patient Active Problem List    Diagnosis Date Noted    Encounter to establish care with new doctor 10/28/2021    Nodule of finger of left hand 03/28/2024    Thrombocytopenia, unspecified (HCC) 09/05/2023    History of embolic stroke 07/21/2023    Nicotine dependence, in remission (Quit within 6 mos) 07/21/2023    Cerebrovascular accident (CVA) due to vascular occlusion (HCC) 04/05/2023    Primary hypertension 04/05/2023    Mixed hyperlipidemia 04/05/2023    Lung nodules 11/30/2021    Right kidney mass 11/19/2021    Weight loss 11/19/2021     Chronic kidney disease, stage 3a (HCC) 06/30/2020    SK (seborrheic keratosis) 06/30/2020    Multiple actinic keratoses 02/28/2019    NAFL (nonalcoholic fatty liver) 03/30/2018    Cervical radiculopathy 02/16/2018    Encounter for screening for lung cancer 05/09/2017    Coronary atherosclerosis due to calcified coronary lesion 12/14/2016    Mixed hyperlipidemia with apolipoprotein E4 variant 09/17/2014    Metabolic syndrome 09/17/2014    Atherosclerosis of both carotid arteries 08/11/2014    Abnormal chest CT 08/11/2014    Screening for osteoporosis 08/07/2014    Primary osteoarthritis of right knee 10/22/2013    Atherosclerosis of aorta (HCC) 10/22/2013    HTN (hypertension) 10/22/2013    Tobacco use 10/22/2013    History of kidney stones      Family History   Problem Relation Age of Onset    Asthma Mother     Heart Attack Mother     Hypertension Father     Stroke Father     Dementia Father     Lung Disease Father         heavy smoker    Other Brother         Morbid Obesity    Alcohol/Drug Brother         prescription drug problem    Alcohol/Drug Brother         street drugs    Cancer Paternal Uncle         Pancreatic    Colon Cancer Cousin     Cancer Paternal Aunt         colon ca      Social History     Socioeconomic History    Marital status:      Spouse name: Not on file    Number of children: Not on file    Years of education: Not on file    Highest education level: Not on file   Occupational History    Not on file   Tobacco Use    Smoking status: Every Day     Current packs/day: 1.00     Average packs/day: 1 pack/day for 50.0 years (50.0 ttl pk-yrs)     Types: Cigarettes    Smokeless tobacco: Never    Tobacco comments:     05/09/17 -- currently 1/2 pack per day   Vaping Use    Vaping Use: Not on file   Substance and Sexual Activity    Alcohol use: Not Currently     Alcohol/week: 0.0 oz     Comment: 1x/month, 1 drink per time    Drug use: No    Sexual activity: Yes     Partners: Female     Birth  control/protection: Post-Menopausal     Comment: ,  multiple companies   Other Topics Concern    Not on file   Social History Narrative    Not on file     Social Determinants of Health     Financial Resource Strain: Not on file   Food Insecurity: Not on file   Transportation Needs: Not on file   Physical Activity: Not on file   Stress: Not on file   Social Connections: Not on file   Intimate Partner Violence: Not on file   Housing Stability: Not on file       Current Outpatient Medications   Medication Sig Dispense Refill    dupilumab (DUPIXENT) 300 MG/2ML injection Inject  under the skin one time.      azithromycin (ZITHROMAX) 500 MG tablet Take 1 Tablet by mouth every day. 6 Tablet 0    losartan (COZAAR) 50 MG Tab Take 1 Tablet by mouth every day. 90 Tablet 3    ezetimibe (ZETIA) 10 MG Tab Take 1 Tablet by mouth every day. 90 Tablet 3    amLODIPine (NORVASC) 10 MG Tab Take 1 Tablet by mouth every day. 90 Tablet 3    rosuvastatin (CRESTOR) 20 MG Tab       aspirin EC (ECOTRIN) 81 MG Tablet Delayed Response Take 1 Tablet by mouth every day. 100 Tablet 3    clotrimazole-betamethasone (LOTRISONE) 1-0.05 % Cream Apply 1 Application topically 2 times a day. 45 g 0    betamethasone dipropionate (DIPROLENE) 0.05 % Ointment Apply 1 Application topically 2 times a day. 45 g 0    methylPREDNISolone (MEDROL DOSEPAK) 4 MG Tablet Therapy Pack As directed on the packaging label. 21 Tablet 0    fluticasone (FLONASE) 50 MCG/ACT nasal spray Administer 1 Spray into affected nostril(S) every day. 16 g 1    ketoconazole (NIZORAL) 2 % Cream       beclomethasone (QVAR) 40 MCG/ACT inhaler Inhale 1 Puff by mouth 2 Times a Day. 3 Inhaler 3    albuterol 108 (90 Base) MCG/ACT Aero Soln inhalation aerosol Inhale 2 Puffs by mouth every 6 hours as needed for Shortness of Breath. 8.5 g 12    Cholecalciferol (VITAMIN D) 2000 UNITS Cap Take 1 Capsule by mouth every day.       No current facility-administered medications for this visit.  "        Review Of Systems  As documented in HPI above  PHYSICAL EXAMINATION:    /60 (BP Location: Left arm, Patient Position: Sitting, BP Cuff Size: Adult)   Pulse 60   Temp 36.8 °C (98.2 °F) (Temporal)   Resp 16   Ht 1.753 m (5' 9\")   Wt 76.7 kg (169 lb)   SpO2 96%   BMI 24.96 kg/m²   Gen.: Well-developed, well-nourished, no apparent distress, pleasant and cooperative with the examination  HEENT: Normocephalic/atraumatic,   Neck: No JVD or bruits, no adenopathy  Cor: Regular rate and rhythm without murmur gallop or rub  Lungs: Clear to auscultation with equal breath sounds bilaterally. No wheezes, rhonchi.  Abdomen: Soft nontender without hepatosplenomegaly or masses appreciated, normoactive bowel sounds  Extremities: No cyanosis, clubbing or edema  Left hand: The palm of the hand there is a small nodule that is mobile soft on the tendon of the ring finger.    ASSESSMENT/Plan:  1. Left shoulder pain, unspecified chronicity  Chronic, possibly osteoarthritis or adhesive capsulitis, patient said he used ibuprofen without resolution of his symptoms discussed physical therapy and follow-up with orthopedics he already has an appointment, advised to do an x-ray  Referral to Physical Therapy    DX-SHOULDER 2+ LEFT      2. Chronic cough  Chronic ongoing, new to me.  Benign clinical exam, advised to do a chest x-ray and take Z-Chip, allergic to penicillin  DX-CHEST-2 VIEWS    azithromycin (ZITHROMAX) 500 MG tablet      3. Nodule of finger of left hand  New concern, discussed plan of management patient prefers conservative management and watchful waiting for now      4. Atherosclerosis of aorta (HCC)  Chronic stable asymptomatic continue monitoring      5. Chronic kidney disease, stage 3a (HCC)  Chronic, stable avoid NSAIDs.  Follow-up with nephrologist as directed      Please note that this dictation was created using voice recognition software. I have worked with consultants from the vendor as well as technical " experts from [facility] to optimize the interface. I have made every reasonable attempt to correct obvious errors, but I expect that there are errors of grammar and possibly content that I did not discover before finalizing the note.

## 2024-04-03 ENCOUNTER — APPOINTMENT (OUTPATIENT)
Dept: RADIOLOGY | Facility: MEDICAL CENTER | Age: 74
End: 2024-04-03
Attending: FAMILY MEDICINE
Payer: MEDICARE

## 2024-04-03 DIAGNOSIS — M25.512 LEFT SHOULDER PAIN, UNSPECIFIED CHRONICITY: ICD-10-CM

## 2024-04-03 DIAGNOSIS — R05.3 CHRONIC COUGH: ICD-10-CM

## 2024-04-03 PROCEDURE — 71046 X-RAY EXAM CHEST 2 VIEWS: CPT

## 2024-04-03 PROCEDURE — 73030 X-RAY EXAM OF SHOULDER: CPT | Mod: LT

## 2024-04-06 ENCOUNTER — HOSPITAL ENCOUNTER (OUTPATIENT)
Dept: LAB | Facility: MEDICAL CENTER | Age: 74
End: 2024-04-06
Attending: INTERNAL MEDICINE
Payer: MEDICARE

## 2024-04-06 DIAGNOSIS — N18.31 STAGE 3A CHRONIC KIDNEY DISEASE: ICD-10-CM

## 2024-04-06 DIAGNOSIS — I10 PRIMARY HYPERTENSION: ICD-10-CM

## 2024-04-06 DIAGNOSIS — N20.0 NEPHROLITHIASIS: ICD-10-CM

## 2024-04-06 DIAGNOSIS — D64.9 ANEMIA, UNSPECIFIED TYPE: ICD-10-CM

## 2024-04-06 LAB
25(OH)D3 SERPL-MCNC: 62 NG/ML (ref 30–100)
ANION GAP SERPL CALC-SCNC: 11 MMOL/L (ref 7–16)
APPEARANCE UR: CLEAR
BILIRUB UR QL STRIP.AUTO: NEGATIVE
BUN SERPL-MCNC: 22 MG/DL (ref 8–22)
CALCIUM SERPL-MCNC: 9.4 MG/DL (ref 8.5–10.5)
CHLORIDE SERPL-SCNC: 105 MMOL/L (ref 96–112)
CO2 SERPL-SCNC: 23 MMOL/L (ref 20–33)
COLOR UR: YELLOW
CREAT SERPL-MCNC: 1.5 MG/DL (ref 0.5–1.4)
ERYTHROCYTE [DISTWIDTH] IN BLOOD BY AUTOMATED COUNT: 44.5 FL (ref 35.9–50)
GFR SERPLBLD CREATININE-BSD FMLA CKD-EPI: 48 ML/MIN/1.73 M 2
GLUCOSE SERPL-MCNC: 116 MG/DL (ref 65–99)
GLUCOSE UR STRIP.AUTO-MCNC: NEGATIVE MG/DL
HCT VFR BLD AUTO: 41.4 % (ref 42–52)
HGB BLD-MCNC: 13.5 G/DL (ref 14–18)
KETONES UR STRIP.AUTO-MCNC: NEGATIVE MG/DL
LEUKOCYTE ESTERASE UR QL STRIP.AUTO: NEGATIVE
MCH RBC QN AUTO: 31.1 PG (ref 27–33)
MCHC RBC AUTO-ENTMCNC: 32.6 G/DL (ref 32.3–36.5)
MCV RBC AUTO: 95.4 FL (ref 81.4–97.8)
MICRO URNS: NORMAL
NITRITE UR QL STRIP.AUTO: NEGATIVE
PH UR STRIP.AUTO: 6 [PH] (ref 5–8)
PLATELET # BLD AUTO: 152 K/UL (ref 164–446)
PMV BLD AUTO: 11.7 FL (ref 9–12.9)
POTASSIUM SERPL-SCNC: 5.4 MMOL/L (ref 3.6–5.5)
PROT UR QL STRIP: NEGATIVE MG/DL
RBC # BLD AUTO: 4.34 M/UL (ref 4.7–6.1)
RBC UR QL AUTO: NEGATIVE
SODIUM SERPL-SCNC: 139 MMOL/L (ref 135–145)
SP GR UR STRIP.AUTO: 1.02
UROBILINOGEN UR STRIP.AUTO-MCNC: 0.2 MG/DL
WBC # BLD AUTO: 9 K/UL (ref 4.8–10.8)

## 2024-04-06 PROCEDURE — 36415 COLL VENOUS BLD VENIPUNCTURE: CPT

## 2024-04-06 PROCEDURE — 82306 VITAMIN D 25 HYDROXY: CPT

## 2024-04-06 PROCEDURE — 85027 COMPLETE CBC AUTOMATED: CPT

## 2024-04-06 PROCEDURE — 80048 BASIC METABOLIC PNL TOTAL CA: CPT

## 2024-04-06 PROCEDURE — 81003 URINALYSIS AUTO W/O SCOPE: CPT

## 2024-05-08 ENCOUNTER — OFFICE VISIT (OUTPATIENT)
Dept: NEPHROLOGY | Facility: MEDICAL CENTER | Age: 74
End: 2024-05-08
Payer: MEDICARE

## 2024-05-08 VITALS
HEART RATE: 61 BPM | DIASTOLIC BLOOD PRESSURE: 56 MMHG | HEIGHT: 69 IN | OXYGEN SATURATION: 97 % | TEMPERATURE: 98.5 F | BODY MASS INDEX: 24.88 KG/M2 | SYSTOLIC BLOOD PRESSURE: 120 MMHG | WEIGHT: 168 LBS

## 2024-05-08 DIAGNOSIS — I10 PRIMARY HYPERTENSION: ICD-10-CM

## 2024-05-08 DIAGNOSIS — R80.9 MICROALBUMINURIA: ICD-10-CM

## 2024-05-08 DIAGNOSIS — N18.31 STAGE 3A CHRONIC KIDNEY DISEASE: ICD-10-CM

## 2024-05-08 DIAGNOSIS — D64.9 ANEMIA, UNSPECIFIED TYPE: ICD-10-CM

## 2024-05-08 DIAGNOSIS — E55.9 VITAMIN D DEFICIENCY: ICD-10-CM

## 2024-05-08 PROCEDURE — 3078F DIAST BP <80 MM HG: CPT | Performed by: INTERNAL MEDICINE

## 2024-05-08 PROCEDURE — 99213 OFFICE O/P EST LOW 20 MIN: CPT | Performed by: INTERNAL MEDICINE

## 2024-05-08 PROCEDURE — 3074F SYST BP LT 130 MM HG: CPT | Performed by: INTERNAL MEDICINE

## 2024-05-08 ASSESSMENT — ENCOUNTER SYMPTOMS
FEVER: 0
NAUSEA: 0
DIARRHEA: 0
EYES NEGATIVE: 1
SHORTNESS OF BREATH: 0
ABDOMINAL PAIN: 0
PALPITATIONS: 0
ORTHOPNEA: 0
COUGH: 0
VOMITING: 0
WHEEZING: 0
HEMOPTYSIS: 0
FLANK PAIN: 0
WEIGHT LOSS: 0
CHILLS: 0
SINUS PAIN: 0

## 2024-05-08 ASSESSMENT — FIBROSIS 4 INDEX: FIB4 SCORE: 2.47

## 2024-05-08 NOTE — PROGRESS NOTES
Subjective     Deacon Pulido is a 73 y.o. male who presents with Chronic Kidney Disease            Chronic Kidney Disease  Pertinent negatives include no abdominal pain, chest pain, chills, congestion, coughing, fever, nausea or vomiting.     Deacon is coming today for f/u of CKD IIIa  Doing well, no complaints  No dysuria/hematuria flank pain  Long term hx/of HTN  (+) nephrolithiasis -stable  CKD III a  -creat level stable at -baseline ( 1.4-1.5)  HTN: BP very well controlled    Review of Systems   Constitutional:  Negative for chills, fever, malaise/fatigue and weight loss.   HENT:  Negative for congestion, hearing loss and sinus pain.    Eyes: Negative.    Respiratory:  Negative for cough, hemoptysis, shortness of breath and wheezing.    Cardiovascular:  Negative for chest pain, palpitations, orthopnea and leg swelling.   Gastrointestinal:  Negative for abdominal pain, diarrhea, nausea and vomiting.   Genitourinary:  Negative for dysuria, flank pain, frequency, hematuria and urgency.   Skin: Negative.    All other systems reviewed and are negative.         Past Medical History:   Diagnosis Date    Allergy     Cervical radiculopathy 2/16/2018    Family history of dementia     Family history of hypertension     Family history of stroke     Hypertension     Kidney stones     Multiple actinic keratoses 2/28/2019    NAFL (nonalcoholic fatty liver) 3/30/2018    Dx on CT Chest 2/24/18    SK (seborrheic keratosis) 6/30/2020    Stage 3 chronic kidney disease 6/30/2020    Tobacco use 10/22/2013    Type 2 diabetes mellitus without complication, without long-term current use of insulin (HCC) 9/17/2014       Family History   Problem Relation Age of Onset    Asthma Mother     Heart Attack Mother     Hypertension Father     Stroke Father     Dementia Father     Lung Disease Father         heavy smoker    Other Brother         Morbid Obesity    Alcohol/Drug Brother         prescription drug problem    Alcohol/Drug Brother       "   street drugs    Cancer Paternal Uncle         Pancreatic    Colon Cancer Cousin     Cancer Paternal Aunt         colon ca        Social History     Socioeconomic History    Marital status:    Tobacco Use    Smoking status: Every Day     Current packs/day: 1.00     Average packs/day: 1 pack/day for 50.0 years (50.0 ttl pk-yrs)     Types: Cigarettes    Smokeless tobacco: Never    Tobacco comments:     05/09/17 -- currently 1/2 pack per day   Substance and Sexual Activity    Alcohol use: Not Currently     Alcohol/week: 0.0 oz     Comment: 1x/month, 1 drink per time    Drug use: No    Sexual activity: Yes     Partners: Female     Birth control/protection: Post-Menopausal     Comment: ,  multiple companies         Objective     /56 (BP Location: Right arm, Patient Position: Sitting, BP Cuff Size: Adult)   Pulse 61   Temp 36.9 °C (98.5 °F) (Temporal)   Ht 1.753 m (5' 9\")   Wt 76.2 kg (168 lb)   SpO2 97%   BMI 24.81 kg/m²      Physical Exam  Vitals reviewed.   Constitutional:       General: He is not in acute distress.     Appearance: Normal appearance. He is well-developed. He is not diaphoretic.   HENT:      Head: Normocephalic and atraumatic.      Nose: Nose normal.      Mouth/Throat:      Mouth: Mucous membranes are moist.      Pharynx: Oropharynx is clear.   Eyes:      Extraocular Movements: Extraocular movements intact.      Conjunctiva/sclera: Conjunctivae normal.      Pupils: Pupils are equal, round, and reactive to light.   Cardiovascular:      Rate and Rhythm: Normal rate and regular rhythm.      Pulses: Normal pulses.      Heart sounds: Normal heart sounds.   Pulmonary:      Effort: Pulmonary effort is normal. No respiratory distress.      Breath sounds: Normal breath sounds. No wheezing or rales.   Abdominal:      General: Bowel sounds are normal. There is no distension.      Palpations: Abdomen is soft. There is no mass.      Tenderness: There is no abdominal tenderness. There is " no right CVA tenderness or left CVA tenderness.   Musculoskeletal:      Cervical back: Normal range of motion and neck supple.      Right lower leg: No edema.      Left lower leg: No edema.   Skin:     General: Skin is warm.      Coloration: Skin is not pale.      Findings: No erythema or rash.   Neurological:      General: No focal deficit present.      Mental Status: He is alert and oriented to person, place, and time.      Cranial Nerves: No cranial nerve deficit.      Coordination: Coordination normal.   Psychiatric:         Mood and Affect: Mood normal.         Behavior: Behavior normal.         Thought Content: Thought content normal.         Judgment: Judgment normal.                    Laboratory results reviewed: d/w Pt   Latest Reference Range & Units 09/05/23 09:48 10/06/23 08:49 10/06/23 08:50 01/16/24 10:41 04/06/24 09:18 04/06/24 09:19   WBC 4.8 - 10.8 K/uL      9.0   RBC 4.70 - 6.10 M/uL      4.34 (L)   Hemoglobin 14.0 - 18.0 g/dL      13.5 (L)   Hematocrit 42.0 - 52.0 %      41.4 (L)   MCV 81.4 - 97.8 fL      95.4   MCH 27.0 - 33.0 pg      31.1   MCHC 32.3 - 36.5 g/dL      32.6   RDW 35.9 - 50.0 fL      44.5   Platelet Count 164 - 446 K/uL      152 (L)   MPV 9.0 - 12.9 fL      11.7   Sodium 135 - 145 mmol/L  138  140  139   Potassium 3.6 - 5.5 mmol/L  5.3  5.3  5.4   Chloride 96 - 112 mmol/L  109  107  105   Co2 20 - 33 mmol/L  24  24  23   Anion Gap 7.0 - 16.0   5.0 (L)  9.0  11.0   Glucose 65 - 99 mg/dL  110 (H)  112 (H)  116 (H)   Bun 8 - 22 mg/dL  24 (H)  23 (H)  22   Creatinine 0.50 - 1.40 mg/dL  1.46 (H)  1.56 (H)  1.50 (H)   GFR (CKD-EPI) >60 mL/min/1.73 m 2  50 !  46 !  48 !   Calcium 8.5 - 10.5 mg/dL  9.1  9.0  9.4   Correct Calcium 8.5 - 10.5 mg/dL    8.9     AST(SGOT) 12 - 45 U/L    26     ALT(SGPT) 2 - 50 U/L    27     Alkaline Phosphatase 30 - 99 U/L    74     Total Bilirubin 0.1 - 1.5 mg/dL    0.3     Albumin 3.2 - 4.9 g/dL    4.1     Total Protein 6.0 - 8.2 g/dL    6.7     Globulin 1.9  - 3.5 g/dL    2.6     A-G Ratio g/dL    1.6     Glycohemoglobin 5.8 % 5.7        Fasting Status     Fasting     Cholesterol,Tot 100 - 199 mg/dL    103     Triglycerides 0 - 149 mg/dL    57     HDL >=40 mg/dL    50     LDL <100 mg/dL    42     Micro Alb Creat Ratio 0 - 30 mg/g   see below      Creatinine, Urine mg/dL   105.66      Microalbumin, Urine Random mg/dL   <1.2      Urobilinogen, Urine Negative      0.2    Color      Yellow    Character      Clear    Specific Gravity <1.035      1.016    Ph 5.0 - 8.0      6.0    Glucose Negative mg/dL     Negative    Ketones Negative mg/dL     Negative    Bilirubin Negative      Negative    Occult Blood Negative      Negative    Protein Negative mg/dL     Negative    Nitrite Negative      Negative    Leukocyte Esterase Negative      Negative    Micro Urine Req      see below    25-Hydroxy   Vitamin D 25 30 - 100 ng/mL      62   (L): Data is abnormally low  (H): Data is abnormally high  !: Data is abnormal    Assessment & Plan        1. Stage 3a chronic kidney disease (HCC)       Creat level stable at baseline-to monitor       Keep well hydrated    2. Primary hypertension      BP well controlled now -to monitor      3. Vitamin D deficiency      Well controlled --to monitor      4. Microalbuminuria      negative      5. Nephrolithiasis      left kidney stone - non obstructing      asymptomatoc    6. Anemia, unspecified type      Hb level stable -WNL            Recs:  Continue current treatment  Keep well hydrated  Monitor BP  Avoid NSAID's  Low salt diet  F/u in 6 months

## 2024-05-09 ENCOUNTER — OFFICE VISIT (OUTPATIENT)
Dept: MEDICAL GROUP | Facility: LAB | Age: 74
End: 2024-05-09
Payer: MEDICARE

## 2024-05-09 VITALS
TEMPERATURE: 97.2 F | BODY MASS INDEX: 24.88 KG/M2 | DIASTOLIC BLOOD PRESSURE: 50 MMHG | HEART RATE: 62 BPM | OXYGEN SATURATION: 97 % | RESPIRATION RATE: 16 BRPM | SYSTOLIC BLOOD PRESSURE: 100 MMHG | WEIGHT: 168 LBS | HEIGHT: 69 IN

## 2024-05-09 DIAGNOSIS — N18.31 CHRONIC KIDNEY DISEASE, STAGE 3A: ICD-10-CM

## 2024-05-09 DIAGNOSIS — D64.9 MILD ANEMIA: ICD-10-CM

## 2024-05-09 PROCEDURE — 3074F SYST BP LT 130 MM HG: CPT | Performed by: FAMILY MEDICINE

## 2024-05-09 PROCEDURE — 99213 OFFICE O/P EST LOW 20 MIN: CPT | Performed by: FAMILY MEDICINE

## 2024-05-09 PROCEDURE — 3078F DIAST BP <80 MM HG: CPT | Performed by: FAMILY MEDICINE

## 2024-05-09 ASSESSMENT — FIBROSIS 4 INDEX: FIB4 SCORE: 2.47

## 2024-05-10 NOTE — PROGRESS NOTES
"Chief Complaint   Patient presents with    Follow-Up       Verbal consent was acquired by the patient to use [employer] ambient listening note generation during this visit Yes      HPI: Established patient  History of Present Illness  The patient is a 73-year-old male who presents for evaluation of multiple medical concerns.      CKD:      The patient recently consulted with a nephrologist.  Reviewed records from nephrologist, patient is stage III kidney disease.  Denies symptoms at this time.      Mild anemia   He reports no alterations in stool color, abdominal discomfort, or epigastric pain. However, he does experience dizziness, which he attributes to his visual disturbances. He has recently undergone cataract surgery. He denies experiencing chest pain, dyspnea, or fatigue.    Shoulder pain: Resolved after physical therapy feeling better at this time.      Supplemental Information  He went to the physical therapist for his shoulder and has been doing exercises. He played golf and that loosened up his shoulder. He feels better after the physical therapy.   He does not drink alcohol.              Exam:       /50 (BP Location: Left arm, Patient Position: Sitting, BP Cuff Size: Adult)   Pulse 62   Temp 36.2 °C (97.2 °F) (Temporal)   Resp 16   Ht 1.753 m (5' 9\")   Wt 76.2 kg (168 lb)   SpO2 97%   BMI 24.81 kg/m²   Gen.: Well-developed, well-nourished, no apparent distress, pleasant and cooperative with the examination  HEENT: Normocephalic/atraumatic,     Neck: No JVD or bruits, no adenopathy  Cor: Regular rate and rhythm without murmur gallop or rub  Lungs: Clear to auscultation with equal breath sounds bilaterally. No wheezes, rhonchi.  Abdomen: Soft nontender without hepatosplenomegaly or masses appreciated, normoactive bowel sounds  Extremities: No cyanosis, clubbing or edema         Assessment & Plan    1. Mild anemia  New concern, possibly related to chronic kidney disease.  Patient denies any GI " symptoms.  Will monitor if continues to drop will send patient for colonoscopy upper endoscopy to rule out GI bleed.  No symptoms at this time patient to follow-up as directed    2. Chronic kidney disease, stage 3a  Chronic, asymptomatic continue follow-up with nephrologist, will continue monitoring blood pressure, avoid NSAIDs.  Increase hydration.    Please note that this dictation was created using voice recognition software. I have made every reasonable attempt to correct obvious errors but there may be errors of grammar and content that I may have overlooked prior to finalization of this note.

## 2024-07-03 ENCOUNTER — DOCUMENTATION (OUTPATIENT)
Dept: HEALTH INFORMATION MANAGEMENT | Facility: OTHER | Age: 74
End: 2024-07-03
Payer: COMMERCIAL

## 2024-07-16 ENCOUNTER — TELEPHONE (OUTPATIENT)
Dept: HEALTH INFORMATION MANAGEMENT | Facility: OTHER | Age: 74
End: 2024-07-16
Payer: COMMERCIAL

## 2024-07-17 ENCOUNTER — HOSPITAL ENCOUNTER (OUTPATIENT)
Dept: LAB | Facility: MEDICAL CENTER | Age: 74
End: 2024-07-17
Attending: INTERNAL MEDICINE
Payer: MEDICARE

## 2024-07-17 LAB
ALBUMIN SERPL BCP-MCNC: 3.7 G/DL (ref 3.2–4.9)
ALBUMIN/GLOB SERPL: 1.6 G/DL
ALP SERPL-CCNC: 71 U/L (ref 30–99)
ALT SERPL-CCNC: 24 U/L (ref 2–50)
ANION GAP SERPL CALC-SCNC: 11 MMOL/L (ref 7–16)
AST SERPL-CCNC: 23 U/L (ref 12–45)
BILIRUB SERPL-MCNC: 0.4 MG/DL (ref 0.1–1.5)
BUN SERPL-MCNC: 27 MG/DL (ref 8–22)
CALCIUM ALBUM COR SERPL-MCNC: 9.4 MG/DL (ref 8.5–10.5)
CALCIUM SERPL-MCNC: 9.2 MG/DL (ref 8.5–10.5)
CHLORIDE SERPL-SCNC: 108 MMOL/L (ref 96–112)
CHOLEST SERPL-MCNC: 93 MG/DL (ref 100–199)
CO2 SERPL-SCNC: 20 MMOL/L (ref 20–33)
CREAT SERPL-MCNC: 1.57 MG/DL (ref 0.5–1.4)
FASTING STATUS PATIENT QL REPORTED: NORMAL
GFR SERPLBLD CREATININE-BSD FMLA CKD-EPI: 45 ML/MIN/1.73 M 2
GLOBULIN SER CALC-MCNC: 2.3 G/DL (ref 1.9–3.5)
GLUCOSE SERPL-MCNC: 114 MG/DL (ref 65–99)
HDLC SERPL-MCNC: 52 MG/DL
LDLC SERPL CALC-MCNC: 33 MG/DL
POTASSIUM SERPL-SCNC: 5 MMOL/L (ref 3.6–5.5)
PROT SERPL-MCNC: 6 G/DL (ref 6–8.2)
SODIUM SERPL-SCNC: 139 MMOL/L (ref 135–145)
TRIGL SERPL-MCNC: 39 MG/DL (ref 0–149)

## 2024-07-17 PROCEDURE — 80053 COMPREHEN METABOLIC PANEL: CPT

## 2024-07-17 PROCEDURE — 80061 LIPID PANEL: CPT

## 2024-07-17 PROCEDURE — 36415 COLL VENOUS BLD VENIPUNCTURE: CPT

## 2024-09-08 SDOH — ECONOMIC STABILITY: INCOME INSECURITY: IN THE LAST 12 MONTHS, WAS THERE A TIME WHEN YOU WERE NOT ABLE TO PAY THE MORTGAGE OR RENT ON TIME?: NO

## 2024-09-08 SDOH — ECONOMIC STABILITY: TRANSPORTATION INSECURITY
IN THE PAST 12 MONTHS, HAS THE LACK OF TRANSPORTATION KEPT YOU FROM MEDICAL APPOINTMENTS OR FROM GETTING MEDICATIONS?: NO

## 2024-09-08 SDOH — HEALTH STABILITY: PHYSICAL HEALTH: ON AVERAGE, HOW MANY DAYS PER WEEK DO YOU ENGAGE IN MODERATE TO STRENUOUS EXERCISE (LIKE A BRISK WALK)?: 0 DAYS

## 2024-09-08 SDOH — ECONOMIC STABILITY: FOOD INSECURITY: WITHIN THE PAST 12 MONTHS, YOU WORRIED THAT YOUR FOOD WOULD RUN OUT BEFORE YOU GOT MONEY TO BUY MORE.: NEVER TRUE

## 2024-09-08 SDOH — ECONOMIC STABILITY: FOOD INSECURITY: WITHIN THE PAST 12 MONTHS, THE FOOD YOU BOUGHT JUST DIDN'T LAST AND YOU DIDN'T HAVE MONEY TO GET MORE.: NEVER TRUE

## 2024-09-08 SDOH — HEALTH STABILITY: PHYSICAL HEALTH: ON AVERAGE, HOW MANY MINUTES DO YOU ENGAGE IN EXERCISE AT THIS LEVEL?: PATIENT DECLINED

## 2024-09-08 SDOH — ECONOMIC STABILITY: TRANSPORTATION INSECURITY
IN THE PAST 12 MONTHS, HAS LACK OF TRANSPORTATION KEPT YOU FROM MEETINGS, WORK, OR FROM GETTING THINGS NEEDED FOR DAILY LIVING?: NO

## 2024-09-08 SDOH — ECONOMIC STABILITY: HOUSING INSECURITY
IN THE LAST 12 MONTHS, WAS THERE A TIME WHEN YOU DID NOT HAVE A STEADY PLACE TO SLEEP OR SLEPT IN A SHELTER (INCLUDING NOW)?: NO

## 2024-09-08 SDOH — HEALTH STABILITY: MENTAL HEALTH
STRESS IS WHEN SOMEONE FEELS TENSE, NERVOUS, ANXIOUS, OR CAN'T SLEEP AT NIGHT BECAUSE THEIR MIND IS TROUBLED. HOW STRESSED ARE YOU?: ONLY A LITTLE

## 2024-09-08 SDOH — ECONOMIC STABILITY: INCOME INSECURITY: HOW HARD IS IT FOR YOU TO PAY FOR THE VERY BASICS LIKE FOOD, HOUSING, MEDICAL CARE, AND HEATING?: SOMEWHAT HARD

## 2024-09-08 SDOH — ECONOMIC STABILITY: TRANSPORTATION INSECURITY
IN THE PAST 12 MONTHS, HAS LACK OF RELIABLE TRANSPORTATION KEPT YOU FROM MEDICAL APPOINTMENTS, MEETINGS, WORK OR FROM GETTING THINGS NEEDED FOR DAILY LIVING?: NO

## 2024-09-08 ASSESSMENT — SOCIAL DETERMINANTS OF HEALTH (SDOH)
HOW OFTEN DO YOU ATTEND CHURCH OR RELIGIOUS SERVICES?: NEVER
HOW HARD IS IT FOR YOU TO PAY FOR THE VERY BASICS LIKE FOOD, HOUSING, MEDICAL CARE, AND HEATING?: SOMEWHAT HARD
DO YOU BELONG TO ANY CLUBS OR ORGANIZATIONS SUCH AS CHURCH GROUPS UNIONS, FRATERNAL OR ATHLETIC GROUPS, OR SCHOOL GROUPS?: YES
HOW OFTEN DO YOU HAVE SIX OR MORE DRINKS ON ONE OCCASION: NEVER
IN THE PAST 12 MONTHS, HAS THE ELECTRIC, GAS, OIL, OR WATER COMPANY THREATENED TO SHUT OFF SERVICE IN YOUR HOME?: NO
HOW OFTEN DO YOU HAVE A DRINK CONTAINING ALCOHOL: NEVER
HOW OFTEN DO YOU ATTENT MEETINGS OF THE CLUB OR ORGANIZATION YOU BELONG TO?: 1 TO 4 TIMES PER YEAR
DO YOU BELONG TO ANY CLUBS OR ORGANIZATIONS SUCH AS CHURCH GROUPS UNIONS, FRATERNAL OR ATHLETIC GROUPS, OR SCHOOL GROUPS?: YES
HOW MANY DRINKS CONTAINING ALCOHOL DO YOU HAVE ON A TYPICAL DAY WHEN YOU ARE DRINKING: PATIENT DOES NOT DRINK
HOW OFTEN DO YOU ATTENT MEETINGS OF THE CLUB OR ORGANIZATION YOU BELONG TO?: 1 TO 4 TIMES PER YEAR
HOW OFTEN DO YOU ATTEND CHURCH OR RELIGIOUS SERVICES?: NEVER
WITHIN THE PAST 12 MONTHS, YOU WORRIED THAT YOUR FOOD WOULD RUN OUT BEFORE YOU GOT THE MONEY TO BUY MORE: NEVER TRUE
HOW OFTEN DO YOU GET TOGETHER WITH FRIENDS OR RELATIVES?: ONCE A WEEK
IN A TYPICAL WEEK, HOW MANY TIMES DO YOU TALK ON THE PHONE WITH FAMILY, FRIENDS, OR NEIGHBORS?: ONCE A WEEK
IN A TYPICAL WEEK, HOW MANY TIMES DO YOU TALK ON THE PHONE WITH FAMILY, FRIENDS, OR NEIGHBORS?: ONCE A WEEK
HOW OFTEN DO YOU GET TOGETHER WITH FRIENDS OR RELATIVES?: ONCE A WEEK

## 2024-09-08 ASSESSMENT — LIFESTYLE VARIABLES
HOW MANY STANDARD DRINKS CONTAINING ALCOHOL DO YOU HAVE ON A TYPICAL DAY: PATIENT DOES NOT DRINK
AUDIT-C TOTAL SCORE: 0
HOW OFTEN DO YOU HAVE A DRINK CONTAINING ALCOHOL: NEVER
SKIP TO QUESTIONS 9-10: 1
HOW OFTEN DO YOU HAVE SIX OR MORE DRINKS ON ONE OCCASION: NEVER

## 2024-09-13 ENCOUNTER — OFFICE VISIT (OUTPATIENT)
Dept: MEDICAL GROUP | Facility: LAB | Age: 74
End: 2024-09-13
Payer: MEDICARE

## 2024-09-13 VITALS
RESPIRATION RATE: 16 BRPM | WEIGHT: 161.82 LBS | HEART RATE: 80 BPM | BODY MASS INDEX: 23.97 KG/M2 | SYSTOLIC BLOOD PRESSURE: 100 MMHG | TEMPERATURE: 97.3 F | OXYGEN SATURATION: 98 % | DIASTOLIC BLOOD PRESSURE: 58 MMHG | HEIGHT: 69 IN

## 2024-09-13 DIAGNOSIS — R05.8 PRODUCTIVE COUGH: ICD-10-CM

## 2024-09-13 DIAGNOSIS — Z87.891 FORMER SMOKER: ICD-10-CM

## 2024-09-13 DIAGNOSIS — R05.3 CHRONIC COUGH: ICD-10-CM

## 2024-09-13 DIAGNOSIS — I10 PRIMARY HYPERTENSION: ICD-10-CM

## 2024-09-13 DIAGNOSIS — N18.31 CHRONIC KIDNEY DISEASE, STAGE 3A: ICD-10-CM

## 2024-09-13 DIAGNOSIS — Z86.73 HISTORY OF EMBOLIC STROKE WITHOUT RESIDUAL DEFICITS: ICD-10-CM

## 2024-09-13 PROBLEM — N28.89 RIGHT KIDNEY MASS: Status: RESOLVED | Noted: 2021-11-19 | Resolved: 2024-09-13

## 2024-09-13 PROBLEM — M54.12 CERVICAL RADICULOPATHY: Status: RESOLVED | Noted: 2018-02-16 | Resolved: 2024-09-13

## 2024-09-13 PROCEDURE — 3078F DIAST BP <80 MM HG: CPT | Performed by: STUDENT IN AN ORGANIZED HEALTH CARE EDUCATION/TRAINING PROGRAM

## 2024-09-13 PROCEDURE — 99205 OFFICE O/P NEW HI 60 MIN: CPT | Performed by: STUDENT IN AN ORGANIZED HEALTH CARE EDUCATION/TRAINING PROGRAM

## 2024-09-13 PROCEDURE — 3074F SYST BP LT 130 MM HG: CPT | Performed by: STUDENT IN AN ORGANIZED HEALTH CARE EDUCATION/TRAINING PROGRAM

## 2024-09-13 ASSESSMENT — ENCOUNTER SYMPTOMS
BLOOD IN STOOL: 0
SPUTUM PRODUCTION: 0
FEVER: 0
VOMITING: 0
CHILLS: 0
PALPITATIONS: 0
COUGH: 0
ABDOMINAL PAIN: 0

## 2024-09-13 ASSESSMENT — PATIENT HEALTH QUESTIONNAIRE - PHQ9: CLINICAL INTERPRETATION OF PHQ2 SCORE: 0

## 2024-09-13 ASSESSMENT — FIBROSIS 4 INDEX: FIB4 SCORE: 2.35

## 2024-09-13 NOTE — ASSESSMENT & PLAN NOTE
Chronic  -patient may have chronic bronchitis   -pfts first  -may consider singular to decrease secretions

## 2024-09-13 NOTE — PROGRESS NOTES
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    HISTORY OF THE PRESENT ILLNESS: Patient is a 74 y.o. male with the following medical problems   Past Medical History:   Diagnosis Date    Allergy     Cervical radiculopathy 2/16/2018    Family history of dementia     Family history of hypertension     Family history of stroke     Hypertension     Kidney stones     Multiple actinic keratoses 2/28/2019    NAFL (nonalcoholic fatty liver) 3/30/2018    Dx on CT Chest 2/24/18    SK (seborrheic keratosis) 6/30/2020    Stage 3 chronic kidney disease 6/30/2020    Tobacco use 10/22/2013    Type 2 diabetes mellitus without complication, without long-term current use of insulin (HCC) 9/17/2014     . This pleasant patient is here today to establish care and discuss the following;        Problem   Former Smoker    Smoked 1 ppd for 50+ years and quit in July 2023     Productive Cough    Yellow/green productive cough in the morning mostly. He is unable to recall any PFT testing in the past. He has had imaging that has suggested hyperinflated lungs. Former smoker. He occasionally uses qvar and albuterol     History of Embolic Stroke Without Residual Deficits    Patient had a stroke in March of 2023 and did not visit the hospital and was later diagnosed with a stroke. He is currently taking rosuvastatin 20mg, asa 81mg. He had left  arm weakness with symptoms supposedly lasting for 1 week     Chronic Kidney Disease, Stage 3a    Follows with nephrology. He did have uncontrolled blood pressure for many years      Htn (Hypertension)    Patient currently takes losartan 50mg and amlodipine 10mg. He denies dizziness or any headaches       History of Embolic Stroke (Resolved)   Primary Hypertension (Resolved)   Right Kidney Mass (Resolved)    Seen on [facility] 11/11/21, Ct  "Ordered for further evaluation      Cervical Radiculopathy (Resolved)   Metabolic Syndrome (Resolved)   Abnormal Chest CT (Resolved)   Tobacco Use (Resolved)      Current Outpatient Medications Ordered in Epic   Medication Sig Dispense Refill    losartan (COZAAR) 50 MG Tab Take 1 Tablet by mouth every day. 90 Tablet 3    ezetimibe (ZETIA) 10 MG Tab Take 1 Tablet by mouth every day. 90 Tablet 3    amLODIPine (NORVASC) 10 MG Tab Take 1 Tablet by mouth every day. 90 Tablet 3    rosuvastatin (CRESTOR) 20 MG Tab       aspirin EC (ECOTRIN) 81 MG Tablet Delayed Response Take 1 Tablet by mouth every day. 100 Tablet 3    fluticasone (FLONASE) 50 MCG/ACT nasal spray Administer 1 Spray into affected nostril(S) every day. 16 g 1    beclomethasone (QVAR) 40 MCG/ACT inhaler Inhale 1 Puff by mouth 2 Times a Day. 3 Inhaler 3    albuterol 108 (90 Base) MCG/ACT Aero Soln inhalation aerosol Inhale 2 Puffs by mouth every 6 hours as needed for Shortness of Breath. 8.5 g 12    Cholecalciferol (VITAMIN D) 2000 UNITS Cap Take 1 Capsule by mouth every day.       No current [facility]-ordered facility-administered medications on file.         ROS:   Review of Systems   Constitutional:  Negative for chills and fever.   Respiratory:  Negative for cough and sputum production.    Cardiovascular:  Negative for chest pain and palpitations.   Gastrointestinal:  Negative for abdominal pain, blood in stool and vomiting.         Objective:     Exam: /58 (BP Location: Right arm, Patient Position: Sitting, BP Cuff Size: Adult)   Pulse 80   Temp 36.3 °C (97.3 °F) (Temporal)   Resp 16   Ht 1.753 m (5' 9\")   Wt 73.4 kg (161 lb 13.1 oz)   SpO2 98%  Body mass index is 23.9 kg/m².    Physical Exam  Constitutional:       General: He is not in acute distress.     Appearance: He is not ill-appearing.   Pulmonary:      Effort: Pulmonary effort is normal.      Breath sounds: No wheezing, rhonchi or rales.   Neurological:      Mental Status: He is alert. "   Psychiatric:         Mood and Affect: Mood normal.         Behavior: Behavior normal.         Thought Content: Thought content normal.         Judgment: Judgment normal.               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     1 month follow up for cough and PFT's     Please note that this dictation was created using voice recognition software. I have made every reasonable attempt to correct obvious errors, but I expect that there are errors of grammar and possibly content that I did not discover before finalizing the note.

## 2024-10-08 ENCOUNTER — HOSPITAL ENCOUNTER (OUTPATIENT)
Dept: PULMONOLOGY | Facility: MEDICAL CENTER | Age: 74
End: 2024-10-08
Attending: STUDENT IN AN ORGANIZED HEALTH CARE EDUCATION/TRAINING PROGRAM
Payer: MEDICARE

## 2024-10-08 DIAGNOSIS — R05.3 CHRONIC COUGH: ICD-10-CM

## 2024-10-08 PROCEDURE — 94726 PLETHYSMOGRAPHY LUNG VOLUMES: CPT

## 2024-10-08 PROCEDURE — 94729 DIFFUSING CAPACITY: CPT

## 2024-10-08 PROCEDURE — 94729 DIFFUSING CAPACITY: CPT | Mod: 26 | Performed by: INTERNAL MEDICINE

## 2024-10-08 PROCEDURE — 94726 PLETHYSMOGRAPHY LUNG VOLUMES: CPT | Mod: 26 | Performed by: INTERNAL MEDICINE

## 2024-10-08 PROCEDURE — 94060 EVALUATION OF WHEEZING: CPT | Mod: 26 | Performed by: INTERNAL MEDICINE

## 2024-10-08 PROCEDURE — 94060 EVALUATION OF WHEEZING: CPT

## 2024-10-08 RX ORDER — ALBUTEROL SULFATE 5 MG/ML
2.5 SOLUTION RESPIRATORY (INHALATION)
Status: DISCONTINUED | OUTPATIENT
Start: 2024-10-08 | End: 2024-10-09 | Stop reason: HOSPADM

## 2024-10-08 RX ADMIN — ALBUTEROL SULFATE 2.5 MG: 5 SOLUTION RESPIRATORY (INHALATION) at 08:37

## 2024-10-16 ENCOUNTER — OFFICE VISIT (OUTPATIENT)
Dept: MEDICAL GROUP | Facility: LAB | Age: 74
End: 2024-10-16
Payer: MEDICARE

## 2024-10-16 VITALS
SYSTOLIC BLOOD PRESSURE: 116 MMHG | WEIGHT: 170.42 LBS | RESPIRATION RATE: 16 BRPM | TEMPERATURE: 97.2 F | HEART RATE: 70 BPM | BODY MASS INDEX: 25.24 KG/M2 | HEIGHT: 69 IN | OXYGEN SATURATION: 98 % | DIASTOLIC BLOOD PRESSURE: 58 MMHG

## 2024-10-16 DIAGNOSIS — R06.89 DYSPNEA AND RESPIRATORY ABNORMALITIES: ICD-10-CM

## 2024-10-16 DIAGNOSIS — J43.8 OTHER EMPHYSEMA (HCC): ICD-10-CM

## 2024-10-16 DIAGNOSIS — R06.00 DYSPNEA AND RESPIRATORY ABNORMALITIES: ICD-10-CM

## 2024-10-16 PROCEDURE — 3074F SYST BP LT 130 MM HG: CPT | Performed by: STUDENT IN AN ORGANIZED HEALTH CARE EDUCATION/TRAINING PROGRAM

## 2024-10-16 PROCEDURE — 99213 OFFICE O/P EST LOW 20 MIN: CPT | Performed by: STUDENT IN AN ORGANIZED HEALTH CARE EDUCATION/TRAINING PROGRAM

## 2024-10-16 PROCEDURE — 3078F DIAST BP <80 MM HG: CPT | Performed by: STUDENT IN AN ORGANIZED HEALTH CARE EDUCATION/TRAINING PROGRAM

## 2024-10-16 RX ORDER — ALBUTEROL SULFATE 90 UG/1
2 INHALANT RESPIRATORY (INHALATION) EVERY 6 HOURS PRN
Qty: 8.5 G | Refills: 12 | Status: SHIPPED | OUTPATIENT
Start: 2024-10-16

## 2024-10-16 RX ORDER — FLUTICASONE PROPIONATE AND SALMETEROL 113; 14 UG/1; UG/1
1 POWDER, METERED RESPIRATORY (INHALATION) DAILY
Qty: 1 EACH | Refills: 2 | Status: SHIPPED | OUTPATIENT
Start: 2024-10-16

## 2024-10-16 ASSESSMENT — FIBROSIS 4 INDEX: FIB4 SCORE: 2.35

## 2024-10-16 ASSESSMENT — ENCOUNTER SYMPTOMS
FEVER: 0
SHORTNESS OF BREATH: 0
CHILLS: 0

## 2024-10-29 DIAGNOSIS — E78.2 MIXED HYPERLIPIDEMIA WITH APOLIPOPROTEIN E4 VARIANT: ICD-10-CM

## 2024-10-30 RX ORDER — LOSARTAN POTASSIUM 50 MG/1
50 TABLET ORAL DAILY
Qty: 90 TABLET | Refills: 0 | Status: SHIPPED | OUTPATIENT
Start: 2024-10-30

## 2024-10-30 RX ORDER — EZETIMIBE 10 MG/1
10 TABLET ORAL
Qty: 90 TABLET | Refills: 3 | Status: SHIPPED | OUTPATIENT
Start: 2024-10-30

## 2024-11-20 ENCOUNTER — HOSPITAL ENCOUNTER (OUTPATIENT)
Dept: LAB | Facility: MEDICAL CENTER | Age: 74
End: 2024-11-20
Attending: INTERNAL MEDICINE
Payer: MEDICARE

## 2024-11-20 DIAGNOSIS — N18.31 STAGE 3A CHRONIC KIDNEY DISEASE: ICD-10-CM

## 2024-11-20 DIAGNOSIS — I10 PRIMARY HYPERTENSION: ICD-10-CM

## 2024-11-20 DIAGNOSIS — R80.9 MICROALBUMINURIA: ICD-10-CM

## 2024-11-20 LAB
ANION GAP SERPL CALC-SCNC: 9 MMOL/L (ref 7–16)
BUN SERPL-MCNC: 24 MG/DL (ref 8–22)
CALCIUM SERPL-MCNC: 9.2 MG/DL (ref 8.5–10.5)
CHLORIDE SERPL-SCNC: 110 MMOL/L (ref 96–112)
CO2 SERPL-SCNC: 22 MMOL/L (ref 20–33)
CREAT SERPL-MCNC: 1.67 MG/DL (ref 0.5–1.4)
CREAT UR-MCNC: 125.69 MG/DL
GFR SERPLBLD CREATININE-BSD FMLA CKD-EPI: 42 ML/MIN/1.73 M 2
GLUCOSE SERPL-MCNC: 118 MG/DL (ref 65–99)
MICROALBUMIN UR-MCNC: <1.2 MG/DL
MICROALBUMIN/CREAT UR: NORMAL MG/G (ref 0–30)
POTASSIUM SERPL-SCNC: 5.3 MMOL/L (ref 3.6–5.5)
SODIUM SERPL-SCNC: 141 MMOL/L (ref 135–145)

## 2024-11-20 PROCEDURE — 36415 COLL VENOUS BLD VENIPUNCTURE: CPT

## 2024-11-20 PROCEDURE — 80048 BASIC METABOLIC PNL TOTAL CA: CPT

## 2024-11-20 PROCEDURE — 82043 UR ALBUMIN QUANTITATIVE: CPT

## 2024-11-20 PROCEDURE — 82570 ASSAY OF URINE CREATININE: CPT

## 2024-11-27 ENCOUNTER — OFFICE VISIT (OUTPATIENT)
Dept: NEPHROLOGY | Facility: MEDICAL CENTER | Age: 74
End: 2024-11-27
Payer: MEDICARE

## 2024-11-27 VITALS
TEMPERATURE: 98.6 F | HEART RATE: 64 BPM | WEIGHT: 172 LBS | OXYGEN SATURATION: 96 % | DIASTOLIC BLOOD PRESSURE: 62 MMHG | HEIGHT: 69 IN | BODY MASS INDEX: 25.48 KG/M2 | SYSTOLIC BLOOD PRESSURE: 118 MMHG

## 2024-11-27 DIAGNOSIS — E55.9 VITAMIN D DEFICIENCY: ICD-10-CM

## 2024-11-27 DIAGNOSIS — D64.9 ANEMIA, UNSPECIFIED TYPE: ICD-10-CM

## 2024-11-27 DIAGNOSIS — N20.0 NEPHROLITHIASIS: ICD-10-CM

## 2024-11-27 DIAGNOSIS — R80.9 MICROALBUMINURIA: ICD-10-CM

## 2024-11-27 DIAGNOSIS — I10 PRIMARY HYPERTENSION: ICD-10-CM

## 2024-11-27 DIAGNOSIS — N18.32 STAGE 3B CHRONIC KIDNEY DISEASE: ICD-10-CM

## 2024-11-27 ASSESSMENT — ENCOUNTER SYMPTOMS
FEVER: 0
HEMOPTYSIS: 0
SHORTNESS OF BREATH: 0
SINUS PAIN: 0
FLANK PAIN: 0
VOMITING: 0
NAUSEA: 0
CHILLS: 0
DIARRHEA: 0
PALPITATIONS: 0
WHEEZING: 0
COUGH: 0
ABDOMINAL PAIN: 0
EYES NEGATIVE: 1
ORTHOPNEA: 0
WEIGHT LOSS: 0

## 2024-11-27 ASSESSMENT — FIBROSIS 4 INDEX: FIB4 SCORE: 2.35

## 2024-11-27 NOTE — PATIENT INSTRUCTIONS
Continue current treatment  Keep well hydrated-increase water intake -add lemon   Monitor BP  Avoid NSAID's  Low salt diet

## 2024-11-27 NOTE — PROGRESS NOTES
Subjective     Deacon Pulido is a 74 y.o. male who presents with Chronic Kidney Disease            Chronic Kidney Disease  Pertinent negatives include no abdominal pain, chest pain, chills, congestion, coughing, fever, nausea or vomiting.     Deacon is coming today for f/u of CKD IIIa  Doing well, no complaints  No dysuria/hematuria flank pain  Long term hx/of HTN  (+) nephrolithiasis -stable  CKD III a  -creat level worse from baseline ( 1.4-1.5) -to 1.67 -GFR 42 -CKD IIIb  HTN: BP very well controlled    Review of Systems   Constitutional:  Negative for chills, fever, malaise/fatigue and weight loss.   HENT:  Negative for congestion, hearing loss and sinus pain.    Eyes: Negative.    Respiratory:  Negative for cough, hemoptysis, shortness of breath and wheezing.    Cardiovascular:  Negative for chest pain, palpitations, orthopnea and leg swelling.   Gastrointestinal:  Negative for abdominal pain, diarrhea, nausea and vomiting.   Genitourinary:  Negative for dysuria, flank pain, frequency, hematuria and urgency.   Skin: Negative.    All other systems reviewed and are negative.         Past Medical History:   Diagnosis Date    Allergy     Cervical radiculopathy 2/16/2018    Family history of dementia     Family history of hypertension     Family history of stroke     Hypertension     Kidney stones     Multiple actinic keratoses 2/28/2019    NAFL (nonalcoholic fatty liver) 3/30/2018    Dx on CT Chest 2/24/18    SK (seborrheic keratosis) 6/30/2020    Stage 3 chronic kidney disease 6/30/2020    Tobacco use 10/22/2013    Type 2 diabetes mellitus without complication, without long-term current use of insulin (HCC) 9/17/2014       Family History   Problem Relation Age of Onset    Asthma Mother     Heart Attack Mother     Hypertension Father     Stroke Father     Dementia Father     Lung Disease Father         heavy smoker    Other Brother         Morbid Obesity    Alcohol/Drug Brother         prescription drug problem     Alcohol/Drug Brother         street drugs    Cancer Paternal Uncle         Pancreatic    Colon Cancer Cousin     Cancer Paternal Aunt         colon ca        Social History     Socioeconomic History    Marital status:     Highest education level: Bachelor's degree (e.g., BA, AB, BS)   Tobacco Use    Smoking status: Every Day     Current packs/day: 1.00     Average packs/day: 1 pack/day for 50.0 years (50.0 ttl pk-yrs)     Types: Cigarettes    Smokeless tobacco: Never    Tobacco comments:     05/09/17 -- currently 1/2 pack per day   Substance and Sexual Activity    Alcohol use: Not Currently     Alcohol/week: 0.0 oz     Comment: 1x/month, 1 drink per time    Drug use: No    Sexual activity: Yes     Partners: Female     Birth control/protection: Post-Menopausal     Comment: ,  multiple companies     Social Drivers of Health     Financial Resource Strain: Medium Risk (9/8/2024)    Overall Financial Resource Strain (CARDIA)     Difficulty of Paying Living Expenses: Somewhat hard   Food Insecurity: No Food Insecurity (9/8/2024)    Hunger Vital Sign     Worried About Running Out of Food in the Last Year: Never true     Ran Out of Food in the Last Year: Never true   Transportation Needs: No Transportation Needs (9/8/2024)    PRAPARE - Transportation     Lack of Transportation (Medical): No     Lack of Transportation (Non-Medical): No   Physical Activity: Unknown (9/8/2024)    Exercise Vital Sign     Days of Exercise per Week: 0 days     Minutes of Exercise per Session: Patient declined   Stress: No Stress Concern Present (9/8/2024)    [employer] [employer] - Occupational Stress Questionnaire     Feeling of Stress : Only a little   Social Connections: Moderately Isolated (9/8/2024)    Social Connection and Isolation Panel [NHANES]     Frequency of Communication with Friends and Family: Once a week     Frequency of Social Gatherings with Friends and Family: Once a week     Attends  "Jainism Services: Never     Active Member of Clubs or Organizations: Yes     Attends Club or Organization Meetings: 1 to 4 times per year     Marital Status:    Housing Stability: Low Risk  (9/8/2024)    Housing Stability Vital Sign     Unable to Pay for Housing in the Last Year: No     Number of Times Moved in the Last Year: 0     Homeless in the Last Year: No         Objective     /62 (BP Location: Right arm, Patient Position: Sitting, BP Cuff Size: Adult)   Pulse 64   Temp 37 °C (98.6 °F) (Temporal)   Ht 1.753 m (5' 9\")   Wt 78 kg (172 lb)   SpO2 96%   BMI 25.40 kg/m²      Physical Exam  Vitals reviewed.   Constitutional:       General: He is not in acute distress.     Appearance: Normal appearance. He is well-developed. He is not diaphoretic.   HENT:      Head: Normocephalic and atraumatic.      Nose: Nose normal.      Mouth/Throat:      Mouth: Mucous membranes are moist.      Pharynx: Oropharynx is clear.   Eyes:      Extraocular Movements: Extraocular movements intact.      Conjunctiva/sclera: Conjunctivae normal.      Pupils: Pupils are equal, round, and reactive to light.   Cardiovascular:      Rate and Rhythm: Normal rate and regular rhythm.      Pulses: Normal pulses.      Heart sounds: Normal heart sounds.   Pulmonary:      Effort: Pulmonary effort is normal. No respiratory distress.      Breath sounds: Normal breath sounds. No wheezing or rales.   Abdominal:      General: Bowel sounds are normal. There is no distension.      Palpations: Abdomen is soft. There is no mass.      Tenderness: There is no abdominal tenderness. There is no right CVA tenderness or left CVA tenderness.   Musculoskeletal:      Cervical back: Normal range of motion and neck supple.      Right lower leg: No edema.      Left lower leg: No edema.   Skin:     General: Skin is warm.      Coloration: Skin is not pale.      Findings: No erythema or rash.   Neurological:      General: No focal deficit present.      " Mental Status: He is alert and oriented to person, place, and time.      Cranial Nerves: No cranial nerve deficit.      Coordination: Coordination normal.   Psychiatric:         Mood and Affect: Mood normal.         Behavior: Behavior normal.         Thought Content: Thought content normal.         Judgment: Judgment normal.                    Laboratory results reviewed: d/w Pt   Latest Reference Range & Units 04/06/24 09:19   WBC 4.8 - 10.8 K/uL 9.0   RBC 4.70 - 6.10 M/uL 4.34 (L)   Hemoglobin 14.0 - 18.0 g/dL 13.5 (L)   Hematocrit 42.0 - 52.0 % 41.4 (L)   MCV 81.4 - 97.8 fL 95.4   MCH 27.0 - 33.0 pg 31.1   MCHC 32.3 - 36.5 g/dL 32.6   RDW 35.9 - 50.0 fL 44.5   Platelet Count 164 - 446 K/uL 152 (L)   MPV 9.0 - 12.9 fL 11.7   (L): Data is abnormally low   Latest Reference Range & Units 04/06/24 09:18   Color  Yellow   Character  Clear   Specific Gravity <1.035  1.016   Ph 5.0 - 8.0  6.0   Glucose Negative mg/dL Negative   Ketones Negative mg/dL Negative   Bilirubin Negative  Negative   Occult Blood Negative  Negative   Protein Negative mg/dL Negative   Nitrite Negative  Negative   Leukocyte Esterase Negative  Negative   Urobilinogen, Urine Negative  0.2   Micro Urine Req  see below   '   Latest Reference Range & Units 01/16/24 10:41 04/06/24 09:19 07/17/24 08:18 11/20/24 08:35   Sodium 135 - 145 mmol/L 140 139 139 141   Potassium 3.6 - 5.5 mmol/L 5.3 5.4 5.0 5.3   Chloride 96 - 112 mmol/L 107 105 108 110   Co2 20 - 33 mmol/L 24 23 20 22   Anion Gap 7.0 - 16.0  9.0 11.0 11.0 9.0   Glucose 65 - 99 mg/dL 112 (H) 116 (H) 114 (H) 118 (H)   Bun 8 - 22 mg/dL 23 (H) 22 27 (H) 24 (H)   Creatinine 0.50 - 1.40 mg/dL 1.56 (H) 1.50 (H) 1.57 (H) 1.67 (H)   GFR (CKD-EPI) >60 mL/min/1.73 m 2 46 ! 48 ! 45 ! 42 !   (H): Data is abnormally high  !: Data is abnormal   Latest Reference Range & Units 04/06/24 09:19   25-Hydroxy   Vitamin D 25 30 - 100 ng/mL 62     Assessment & Plan        1. Stage 3a chronic kidney disease (HCC) - worse  to CKD IIIb      Creat level worse from baseline-to monitor       Keep well hydrated    2. Primary hypertension      BP well controlled now -to monitor at home      3. Vitamin D deficiency      Well controlled --to monitor      4. Microalbuminuria      negative      5. Nephrolithiasis      left kidney stone - non obstructing      asymptomatoc    6. Anemia, unspecified type      To monitor            Recs:  Continue current treatment  Keep well hydrated  Monitor BP  Avoid NSAID's  Low salt diet  F/u in 3 months

## 2025-01-24 DIAGNOSIS — I10 ESSENTIAL HYPERTENSION: ICD-10-CM

## 2025-01-24 RX ORDER — AMLODIPINE BESYLATE 10 MG/1
10 TABLET ORAL
Qty: 90 TABLET | Refills: 3 | Status: SHIPPED | OUTPATIENT
Start: 2025-01-24

## 2025-02-03 RX ORDER — LOSARTAN POTASSIUM 50 MG/1
50 TABLET ORAL DAILY
Qty: 90 TABLET | Refills: 1 | Status: SHIPPED | OUTPATIENT
Start: 2025-02-03

## 2025-02-15 ENCOUNTER — HOSPITAL ENCOUNTER (OUTPATIENT)
Dept: LAB | Facility: MEDICAL CENTER | Age: 75
End: 2025-02-15
Attending: INTERNAL MEDICINE
Payer: MEDICARE

## 2025-02-15 DIAGNOSIS — D64.9 ANEMIA, UNSPECIFIED TYPE: ICD-10-CM

## 2025-02-15 DIAGNOSIS — N20.0 NEPHROLITHIASIS: ICD-10-CM

## 2025-02-15 DIAGNOSIS — N18.32 STAGE 3B CHRONIC KIDNEY DISEASE: ICD-10-CM

## 2025-02-15 LAB
ANION GAP SERPL CALC-SCNC: 10 MMOL/L (ref 7–16)
APPEARANCE UR: CLEAR
BACTERIA #/AREA URNS HPF: NORMAL /HPF
BILIRUB UR QL STRIP.AUTO: NEGATIVE
BUN SERPL-MCNC: 19 MG/DL (ref 8–22)
CALCIUM SERPL-MCNC: 8.9 MG/DL (ref 8.5–10.5)
CASTS URNS QL MICRO: NORMAL /LPF (ref 0–2)
CHLORIDE SERPL-SCNC: 107 MMOL/L (ref 96–112)
CO2 SERPL-SCNC: 23 MMOL/L (ref 20–33)
COLOR UR: YELLOW
CREAT SERPL-MCNC: 1.47 MG/DL (ref 0.5–1.4)
EPITHELIAL CELLS 1715: NORMAL /HPF (ref 0–5)
ERYTHROCYTE [DISTWIDTH] IN BLOOD BY AUTOMATED COUNT: 46.5 FL (ref 35.9–50)
GFR SERPLBLD CREATININE-BSD FMLA CKD-EPI: 49 ML/MIN/1.73 M 2
GLUCOSE SERPL-MCNC: 121 MG/DL (ref 65–99)
GLUCOSE UR STRIP.AUTO-MCNC: NEGATIVE MG/DL
HCT VFR BLD AUTO: 39.4 % (ref 42–52)
HGB BLD-MCNC: 12.6 G/DL (ref 14–18)
KETONES UR STRIP.AUTO-MCNC: NEGATIVE MG/DL
LEUKOCYTE ESTERASE UR QL STRIP.AUTO: NEGATIVE
MCH RBC QN AUTO: 31.1 PG (ref 27–33)
MCHC RBC AUTO-ENTMCNC: 32 G/DL (ref 32.3–36.5)
MCV RBC AUTO: 97.3 FL (ref 81.4–97.8)
MICRO URNS: ABNORMAL
NITRITE UR QL STRIP.AUTO: NEGATIVE
PH UR STRIP.AUTO: 6 [PH] (ref 5–8)
PLATELET # BLD AUTO: 144 K/UL (ref 164–446)
PMV BLD AUTO: 11.9 FL (ref 9–12.9)
POTASSIUM SERPL-SCNC: 5.2 MMOL/L (ref 3.6–5.5)
PROT UR QL STRIP: NEGATIVE MG/DL
RBC # BLD AUTO: 4.05 M/UL (ref 4.7–6.1)
RBC # URNS HPF: NORMAL /HPF (ref 0–2)
RBC UR QL AUTO: ABNORMAL
SODIUM SERPL-SCNC: 140 MMOL/L (ref 135–145)
SP GR UR STRIP.AUTO: 1.01
UROBILINOGEN UR STRIP.AUTO-MCNC: 1 EU/DL
WBC # BLD AUTO: 8.1 K/UL (ref 4.8–10.8)
WBC #/AREA URNS HPF: NORMAL /HPF

## 2025-02-15 PROCEDURE — 85027 COMPLETE CBC AUTOMATED: CPT

## 2025-02-15 PROCEDURE — 81001 URINALYSIS AUTO W/SCOPE: CPT

## 2025-02-15 PROCEDURE — 80048 BASIC METABOLIC PNL TOTAL CA: CPT

## 2025-02-15 PROCEDURE — 36415 COLL VENOUS BLD VENIPUNCTURE: CPT

## 2025-02-20 ENCOUNTER — OFFICE VISIT (OUTPATIENT)
Dept: NEPHROLOGY | Facility: MEDICAL CENTER | Age: 75
End: 2025-02-20
Payer: MEDICARE

## 2025-02-20 VITALS
SYSTOLIC BLOOD PRESSURE: 124 MMHG | OXYGEN SATURATION: 96 % | RESPIRATION RATE: 18 BRPM | TEMPERATURE: 97.2 F | HEART RATE: 67 BPM | DIASTOLIC BLOOD PRESSURE: 82 MMHG | BODY MASS INDEX: 25.77 KG/M2 | WEIGHT: 174 LBS | HEIGHT: 69 IN

## 2025-02-20 DIAGNOSIS — N20.0 NEPHROLITHIASIS: ICD-10-CM

## 2025-02-20 DIAGNOSIS — I10 PRIMARY HYPERTENSION: ICD-10-CM

## 2025-02-20 DIAGNOSIS — D64.9 ANEMIA, UNSPECIFIED TYPE: ICD-10-CM

## 2025-02-20 DIAGNOSIS — E55.9 VITAMIN D DEFICIENCY: ICD-10-CM

## 2025-02-20 DIAGNOSIS — R80.9 MICROALBUMINURIA: ICD-10-CM

## 2025-02-20 DIAGNOSIS — N18.31 STAGE 3A CHRONIC KIDNEY DISEASE: ICD-10-CM

## 2025-02-20 PROCEDURE — 99214 OFFICE O/P EST MOD 30 MIN: CPT | Performed by: INTERNAL MEDICINE

## 2025-02-20 PROCEDURE — G2211 COMPLEX E/M VISIT ADD ON: HCPCS | Performed by: INTERNAL MEDICINE

## 2025-02-20 PROCEDURE — 3079F DIAST BP 80-89 MM HG: CPT | Performed by: INTERNAL MEDICINE

## 2025-02-20 PROCEDURE — 3074F SYST BP LT 130 MM HG: CPT | Performed by: INTERNAL MEDICINE

## 2025-02-20 ASSESSMENT — ENCOUNTER SYMPTOMS
EYES NEGATIVE: 1
FLANK PAIN: 0
NAUSEA: 0
ABDOMINAL PAIN: 0
COUGH: 0
WHEEZING: 0
PALPITATIONS: 0
DIARRHEA: 0
FEVER: 0
VOMITING: 0
SINUS PAIN: 0
CHILLS: 0
WEIGHT LOSS: 0
SHORTNESS OF BREATH: 0
ORTHOPNEA: 0
HEMOPTYSIS: 0

## 2025-02-20 ASSESSMENT — FIBROSIS 4 INDEX: FIB4 SCORE: 2.48

## 2025-02-20 NOTE — PROGRESS NOTES
Subjective     Deacon Pulido is a 74 y.o. male who presents with Chronic Kidney Disease            Chronic Kidney Disease  Pertinent negatives include no abdominal pain, chest pain, chills, congestion, coughing, fever, nausea or vomiting.     Deacon is coming today for f/u of CKD IIIa  Doing well, no complaints  No dysuria/hematuria flank pain  Long term hx/of HTN  (+) nephrolithiasis -stable  CKD III a  -creat level worse from baseline ( 1.4-1.5) -to 1.67 -GFR 42 -CKD IIIb - now better to 1.47 -baseline  HTN: BP very well controlled  Anemia  Drop in Hb level -to check iron panel    Review of Systems   Constitutional:  Negative for chills, fever, malaise/fatigue and weight loss.   HENT:  Negative for congestion, hearing loss and sinus pain.    Eyes: Negative.    Respiratory:  Negative for cough, hemoptysis, shortness of breath and wheezing.    Cardiovascular:  Negative for chest pain, palpitations, orthopnea and leg swelling.   Gastrointestinal:  Negative for abdominal pain, diarrhea, nausea and vomiting.   Genitourinary:  Negative for dysuria, flank pain, frequency, hematuria and urgency.   Skin: Negative.    All other systems reviewed and are negative.         Past Medical History:   Diagnosis Date    Allergy     Cervical radiculopathy 2/16/2018    Family history of dementia     Family history of hypertension     Family history of stroke     Hypertension     Kidney stones     Multiple actinic keratoses 2/28/2019    NAFL (nonalcoholic fatty liver) 3/30/2018    Dx on CT Chest 2/24/18    SK (seborrheic keratosis) 6/30/2020    Stage 3 chronic kidney disease 6/30/2020    Tobacco use 10/22/2013    Type 2 diabetes mellitus without complication, without long-term current use of insulin (HCC) 9/17/2014       Family History   Problem Relation Age of Onset    Asthma Mother     Heart Attack Mother     Hypertension Father     Stroke Father     Dementia Father     Lung Disease Father         heavy smoker    Other Brother          Morbid Obesity    Alcohol/Drug Brother         prescription drug problem    Alcohol/Drug Brother         street drugs    Cancer Paternal Uncle         Pancreatic    Colon Cancer Cousin     Cancer Paternal Aunt         colon ca        Social History     Socioeconomic History    Marital status:     Highest education level: Bachelor's degree (e.g., BA, AB, BS)   Tobacco Use    Smoking status: Every Day     Current packs/day: 1.00     Average packs/day: 1 pack/day for 50.0 years (50.0 ttl pk-yrs)     Types: Cigarettes    Smokeless tobacco: Never    Tobacco comments:     05/09/17 -- currently 1/2 pack per day   Substance and Sexual Activity    Alcohol use: Not Currently     Alcohol/week: 0.0 oz     Comment: 1x/month, 1 drink per time    Drug use: No    Sexual activity: Yes     Partners: Female     Birth control/protection: Post-Menopausal     Comment: ,  multiple companies     Social Drivers of Health     Financial Resource Strain: Medium Risk (9/8/2024)    Overall Financial Resource Strain (CARDIA)     Difficulty of Paying Living Expenses: Somewhat hard   Food Insecurity: No Food Insecurity (9/8/2024)    Hunger Vital Sign     Worried About Running Out of Food in the Last Year: Never true     Ran Out of Food in the Last Year: Never true   Transportation Needs: No Transportation Needs (9/8/2024)    PRAPARE - Transportation     Lack of Transportation (Medical): No     Lack of Transportation (Non-Medical): No   Physical Activity: Unknown (9/8/2024)    Exercise Vital Sign     Days of Exercise per Week: 0 days     Minutes of Exercise per Session: Patient declined   Stress: No Stress Concern Present (9/8/2024)    [employer] [place] [employer] - Occupational Stress Questionnaire     Feeling of Stress : Only a little   Social Connections: Moderately Isolated (9/8/2024)    Social Connection and Isolation Panel [NHANES]     Frequency of Communication with Friends and Family: Once a week     Frequency  "of Social Gatherings with Friends and Family: Once a week     Attends Orthodoxy Services: Never     Active Member of Clubs or Organizations: Yes     Attends Club or Organization Meetings: 1 to 4 times per year     Marital Status:    Housing Stability: Low Risk  (9/8/2024)    Housing Stability Vital Sign     Unable to Pay for Housing in the Last Year: No     Number of Times Moved in the Last Year: 0     Homeless in the Last Year: No         Objective     /82   Pulse 67   Temp 36.2 °C (97.2 °F) (Temporal)   Resp 18   Ht 1.753 m (5' 9\")   Wt 78.9 kg (174 lb)   SpO2 96%   BMI 25.70 kg/m²      Physical Exam  Vitals reviewed.   Constitutional:       General: He is not in acute distress.     Appearance: Normal appearance. He is well-developed. He is not diaphoretic.   HENT:      Head: Normocephalic and atraumatic.      Nose: Nose normal.      Mouth/Throat:      Mouth: Mucous membranes are moist.      Pharynx: Oropharynx is clear.   Eyes:      Extraocular Movements: Extraocular movements intact.      Conjunctiva/sclera: Conjunctivae normal.      Pupils: Pupils are equal, round, and reactive to light.   Cardiovascular:      Rate and Rhythm: Normal rate and regular rhythm.      Pulses: Normal pulses.      Heart sounds: Normal heart sounds.   Pulmonary:      Effort: Pulmonary effort is normal. No respiratory distress.      Breath sounds: Normal breath sounds. No wheezing or rales.   Abdominal:      General: Bowel sounds are normal. There is no distension.      Palpations: Abdomen is soft. There is no mass.      Tenderness: There is no abdominal tenderness. There is no right CVA tenderness or left CVA tenderness.   Musculoskeletal:      Cervical back: Normal range of motion and neck supple.      Right lower leg: No edema.      Left lower leg: No edema.   Skin:     General: Skin is warm.      Coloration: Skin is not pale.      Findings: No erythema or rash.   Neurological:      General: No focal deficit " present.      Mental Status: He is alert and oriented to person, place, and time.      Cranial Nerves: No cranial nerve deficit.      Coordination: Coordination normal.   Psychiatric:         Mood and Affect: Mood normal.         Behavior: Behavior normal.         Thought Content: Thought content normal.         Judgment: Judgment normal.                    Laboratory results reviewed: d/w Pt          Assessment & Plan        1. Stage 3a chronic kidney disease (HCC)       Creat level improved back to baseline-to monitor       Keep well hydrated    2. Primary hypertension      BP well controlled at goal -to monitor at home      3. Vitamin D deficiency      Well controlled --to monitor      4. Microalbuminuria      negative      5. Nephrolithiasis      left kidney stone - non obstructing      asymptomatoc    6. Anemia, unspecified type      Drop in Hb level -to monitor , check iron panel            Recs:  Continue current treatment  Keep well hydrated  Monitor BP  Avoid NSAID's  Low salt diet  F/u in 4 months

## 2025-04-17 ENCOUNTER — OFFICE VISIT (OUTPATIENT)
Dept: MEDICAL GROUP | Facility: LAB | Age: 75
End: 2025-04-17
Payer: MEDICARE

## 2025-04-17 VITALS
HEIGHT: 69 IN | DIASTOLIC BLOOD PRESSURE: 58 MMHG | TEMPERATURE: 97.4 F | HEART RATE: 65 BPM | SYSTOLIC BLOOD PRESSURE: 118 MMHG | BODY MASS INDEX: 25.56 KG/M2 | OXYGEN SATURATION: 95 % | RESPIRATION RATE: 18 BRPM | WEIGHT: 172.6 LBS

## 2025-04-17 DIAGNOSIS — Z00.00 ANNUAL PHYSICAL EXAM: ICD-10-CM

## 2025-04-17 PROCEDURE — 3078F DIAST BP <80 MM HG: CPT | Performed by: STUDENT IN AN ORGANIZED HEALTH CARE EDUCATION/TRAINING PROGRAM

## 2025-04-17 PROCEDURE — G0439 PPPS, SUBSEQ VISIT: HCPCS | Performed by: STUDENT IN AN ORGANIZED HEALTH CARE EDUCATION/TRAINING PROGRAM

## 2025-04-17 PROCEDURE — 3074F SYST BP LT 130 MM HG: CPT | Performed by: STUDENT IN AN ORGANIZED HEALTH CARE EDUCATION/TRAINING PROGRAM

## 2025-04-17 ASSESSMENT — FIBROSIS 4 INDEX: FIB4 SCORE: 2.48

## 2025-04-17 ASSESSMENT — ENCOUNTER SYMPTOMS: GENERAL WELL-BEING: GOOD

## 2025-04-17 ASSESSMENT — ACTIVITIES OF DAILY LIVING (ADL): BATHING_REQUIRES_ASSISTANCE: 0

## 2025-04-17 NOTE — PROGRESS NOTES
Chief Complaint   Patient presents with    Annual Wellness Visit       HPI:  Deacon Pulido is a 74 y.o. here for Medicare Annual Wellness Visit     Patient Active Problem List    Diagnosis Date Noted    Other emphysema (HCC) 10/16/2024    Former smoker 09/13/2024    Productive cough 09/13/2024    Nodule of finger of left hand 03/28/2024    Nicotine dependence, in remission (Quit within 6 mos) 07/21/2023    History of embolic stroke without residual deficits 04/05/2023    Mixed hyperlipidemia 04/05/2023    Lung nodules 11/30/2021    Weight loss 11/19/2021    Encounter to establish care with new doctor 10/28/2021    Chronic kidney disease, stage 3a 06/30/2020    SK (seborrheic keratosis) 06/30/2020    Multiple actinic keratoses 02/28/2019    NAFL (nonalcoholic fatty liver) 03/30/2018    Encounter for screening for lung cancer 05/09/2017    Coronary atherosclerosis due to calcified coronary lesion 12/14/2016    Mixed hyperlipidemia with apolipoprotein E4 variant 09/17/2014    Atherosclerosis of both carotid arteries 08/11/2014    Screening for osteoporosis 08/07/2014    Primary osteoarthritis of right knee 10/22/2013    Atherosclerosis of aorta (HCC) 10/22/2013    HTN (hypertension) 10/22/2013    History of kidney stones        Current Outpatient Medications   Medication Sig Dispense Refill    losartan (COZAAR) 50 MG Tab Take 1 Tablet by mouth every day. 90 Tablet 1    amLODIPine (NORVASC) 10 MG Tab Take 1 Tablet by mouth every day. 90 Tablet 3    ezetimibe (ZETIA) 10 MG Tab Take 1 Tablet by mouth every day. 90 Tablet 3    albuterol 108 (90 Base) MCG/ACT Aero Soln inhalation aerosol Inhale 2 Puffs every 6 hours as needed for Shortness of Breath. 8.5 g 12    rosuvastatin (CRESTOR) 20 MG Tab       aspirin EC (ECOTRIN) 81 MG Tablet Delayed Response Take 1 Tablet by mouth every day. 100 Tablet 3    fluticasone (FLONASE) 50 MCG/ACT nasal spray Administer 1 Spray into affected nostril(S) every day. 16 g 1     Cholecalciferol (VITAMIN D) 2000 UNITS Cap Take 1 Capsule by mouth every day.       No current facility-administered medications for this visit.          Current supplements as per medication list.     Allergies: Penicillamine, Cephalexin, Cephalosporins, and Peanut-derived    Current social contact/activities:     He  reports that he has been smoking cigarettes. He has a 50 pack-year smoking history. He has never used smokeless tobacco. He reports that he does not currently use alcohol. He reports that he does not use drugs.  Ready to quit: Not Answered  Counseling given: Not Answered  Tobacco comments: 05/09/17 -- currently 1/2 pack per day      ROS:    Gait: Uses no assistive device  Ostomy: No  Other tubes: No  Amputations: No  Chronic oxygen use: No  Last eye exam: 2025  Wears hearing aids: No   : Reports urinary leakage during the last 6 months that has not interfered at all with their daily activities or sleep.    Screening:    Depression Screening  Little interest or pleasure in doing things?     Feeling down, depressed , or hopeless?    Trouble falling or staying asleep, or sleeping too much?     Feeling tired or having little energy?     Poor appetite or overeating?     Feeling bad about yourself - or that you are a failure or have let yourself or your family down?    Trouble concentrating on things, such as reading the newspaper or watching television?    Moving or speaking so slowly that other people could have noticed.  Or the opposite - being so fidgety or restless that you have been moving around a lot more than usual?     Thoughts that you would be better off dead, or of hurting yourself?     Patient Health Questionnaire Score:      If depressive symptoms identified deferred to follow up visit unless specifically addressed in assessment and plan.    Interpretation of PHQ-9 Total Score   Score Severity   1-4 No Depression   5-9 Mild Depression   10-14 Moderate Depression   15-19 Moderately Severe  Depression   20-27 Severe Depression    Screening for Cognitive Impairment  Do you or any of your friends or family members have any concern about your memory? No  Three Minute Recall (Village, Kitchen, Baby) 1/3 village  Gabe clock face with all 12 numbers and set the hands to show 10 minutes past 11.  Yes    Cognitive concerns identified deferred for follow up unless specifically addressed in assessment and plan.    Fall Risk Assessment  Has the patient had two or more falls in the last year or any fall with injury in the last year?  No    Safety Assessment  Do you always wear your seatbelt?  Yes  Any changes to home needed to function safely? No  Difficulty hearing.  No  Patient counseled about all safety risks that were identified.    Functional Assessment ADLs  Are there any barriers preventing you from cooking for yourself or meeting nutritional needs?  No.    Are there any barriers preventing you from driving safely or obtaining transportation?  No.    Are there any barriers preventing you from using a telephone or calling for help?  No    Are there any barriers preventing you from shopping?  No.    Are there any barriers preventing you from taking care of your own finances?  No    Are there any barriers preventing you from managing your medications?  No    Are there any barriers preventing you from showering, bathing or dressing yourself? No    Are there any barriers preventing you from doing housework or laundry? No    Are there any barriers preventing you from using the toilet?No    Are you currently engaging in any exercise or physical activity?  Yes. golf    Self-Assessment of Health  What is your perception of your health? Good    Do you sleep more than six hours a night? Yes    In the past 7 days, how much did pain keep you from doing your normal work? Some back  Do you spend quality time with family or friends (virtually or in person)? Yes    Do you usually eat a heart healthy diet that constists of a  variety of fruits, vegetables, whole grains and fiber? No    Do you eat foods high in fat and/or Fast Food more than three times per week? No    How concerned are you that your medical conditions are not being well managed? Not at all    Are you worried that in the next 2 months, you may not have stable housing that you own, rent, or stay in as part of a household? No        Advance Care Planning  Do you have an Advance Directive, Living Will, Durable Power of , or POLST? Yes    Living Will     is not on file - instructed patient to bring in a copy to scan into their chart      Health Maintenance Summary            Current Care Gaps       COVID-19 Vaccine (6 - 2024-25 season) Overdue since 9/1/2024 09/30/2022  Imm Admin: PFIZER BIVALENT SARS-COV-2 VACCINE (12+)    04/05/2022  Imm Admin: COVID-19 Vaccine, unspecified - HISTORICAL DATA    09/27/2021  Imm Admin: PFIZER PURPLE CAP SARS-COV-2 VACCINATION (12+)    02/12/2021  Imm Admin: PFIZER PURPLE CAP SARS-COV-2 VACCINATION (12+)    01/22/2021  Imm Admin: PFIZER PURPLE CAP SARS-COV-2 VACCINATION (12+)     Only the first 5 history entries have been loaded, but more history exists.                    Needs Review       Hepatitis C Screening  Tentatively Complete      10/16/2020  Hepatitis C Antibody component of HEP C VIRUS ANTIBODY                      Upcoming       Annual Pulmonary Function Test / Spirometry (Yearly) Next due on 10/8/2025      10/08/2024  PFT Dictated Results              Annual Wellness Visit (Yearly) Next due on 4/17/2026 04/17/2025  Done    09/17/2014  Done                      Completed or No Longer Recommended       Influenza Vaccine (Series Information) Completed      10/12/2024  Outside Immunization: Influenza, High Dose    09/23/2023  Imm Admin: Influenza Vaccine Adult HD    09/30/2022  Imm Admin: Influenza, Unspecified - HISTORICAL DATA    09/10/2021  Imm Admin: Influenza Vaccine Adult HD    09/11/2020  Imm Admin: Influenza  Vaccine Adult HD      Only the first 5 history entries have been loaded, but more history exists.              Zoster (Shingles) Vaccines (Series Information) Completed      07/07/2020  Imm Admin: Zoster Vaccine Recombinant (RZV) (SHINGRIX)    01/06/2020  Imm Admin: Zoster Vaccine Recombinant (RZV) (SHINGRIX)    12/14/2012  Imm Admin: Zoster Vaccine Live (ZVL) (Zostavax) - HISTORICAL DATA    11/15/2012  Imm Admin: Zoster Vaccine Live (ZVL) (Zostavax) - HISTORICAL DATA              Pneumococcal Vaccine: 50+ Years (Series Information) Completed      10/22/2013  Imm Admin: Pneumococcal polysaccharide vaccine (PPSV-23)    03/14/2008  Imm Admin: Pneumococcal Conjugate Vaccine (Prevnar/PCV-13)              Hepatitis A Vaccine (Hep A) (Series Information) Aged Out      05/09/2002  Imm Admin: Hep A/HEP B Combined Vaccine (TwinRix)    03/05/2002  Imm Admin: Hep A/HEP B Combined Vaccine (TwinRix)              HPV Vaccines (Series Information) Aged Out      No completion history exists for this topic.              Polio Vaccine (Inactivated Polio) (Series Information) Aged Out     No completion history exists for this topic.              Meningococcal Immunization (Series Information) Aged Out     No completion history exists for this topic.              Diabetes: Retinopathy Screening  Discontinued        Frequency changed to Never automatically (Topic No Longer Applies)    07/09/2024  RETINAL SCREENING RESULTS              A1c Screening  Discontinued        Frequency changed to Never automatically (Topic No Longer Applies)    09/05/2023  POCT  A1C    08/18/2022  POCT  A1C    10/16/2021  Done    10/16/2020  HEMOGLOBIN A1C     Only the first 5 history entries have been loaded, but more history exists.            Fasting Lipid Profile  Discontinued        Frequency changed to Never automatically (Topic No Longer Applies)    07/17/2024  Lipid Profile    01/16/2024  Lipid Profile    07/19/2023  Lipid Profile    10/28/2021  Lipid  Profile      Only the first 5 history entries have been loaded, but more history exists.              Diabetes: Urine Protein Screening  Discontinued        Frequency changed to Never automatically (Topic No Longer Applies)    11/20/2024  MICROALBUMIN CREAT RATIO URINE    10/06/2023  MICROALBUMIN CREAT RATIO URINE    09/03/2022  MICROALBUMIN CREAT RATIO URINE    02/16/2022  MICROALBUMIN CREAT RATIO URINE      Only the first 5 history entries have been loaded, but more history exists.              SERUM CREATININE  Discontinued        Frequency changed to Never automatically (Topic No Longer Applies)    02/20/2025  Order placed for Basic Metabolic Panel by Lilian Brewer M.D.    02/15/2025  Basic Metabolic Panel    11/20/2024  Basic Metabolic Panel    07/17/2024  Comp Metabolic Panel      Only the first 5 history entries have been loaded, but more history exists.              Colorectal Cancer Screening  Discontinued        Frequency changed to Never automatically (Topic No Longer Applies)    08/08/2022  REFERRAL TO GI FOR COLONOSCOPY    08/08/2022  REFERRAL TO GI FOR COLONOSCOPY    10/30/2021  OCCULT BLOOD FECES IMMUNOASSAY    06/18/2012  Colonoscopy (Done)      Only the first 5 history entries have been loaded, but more history exists.              Hepatitis B Vaccine (Hep B)  Discontinued      05/09/2002  Imm Admin: Hep A/HEP B Combined Vaccine (TwinRix)    03/05/2002  Imm Admin: Hep A/HEP B Combined Vaccine (TwinRix)              IMM DTaP/Tdap/Td Vaccine  Discontinued      11/08/2023  Imm Admin: Tdap Vaccine    09/23/2023  Imm Admin: Tdap Vaccine    06/18/2013  Imm Admin: Tdap Vaccine    03/05/2002  Imm Admin: TD Vaccine              Lung Cancer Screening  Discontinued        Frequency changed to Never automatically (Topic No Longer Applies)    11/30/2021  CT-CHEST,ABDOMEN,PELVIS W/O    02/24/2018  CT-CHEST (THORAX) WITH    11/10/2016  CT-CHEST (THORAX) W/O    08/14/2014  CT-CHEST (THORAX) W/O      Only the first 5  history entries have been loaded, but more history exists.              Abdominal Aortic Aneurysm (AAA) Screening  Discontinued        Frequency changed to Never automatically (Topic No Longer Applies)    08/26/2012  CT-RENAL COLIC EVALUATION(A/P W/O) (R/O kidney stones, ruptured AAA, retroperitoneal hemorrhage)              Lung Cancer Screening Shared Decision Making  Discontinued      No completion history exists for this topic.                            Patient Care Team:  Yossi Johnston D.O. as PCP - General (Internal Medicine)      Social History     Tobacco Use    Smoking status: Every Day     Current packs/day: 1.00     Average packs/day: 1 pack/day for 50.0 years (50.0 ttl pk-yrs)     Types: Cigarettes    Smokeless tobacco: Never    Tobacco comments:     05/09/17 -- currently 1/2 pack per day   Substance Use Topics    Alcohol use: Not Currently     Alcohol/week: 0.0 oz     Comment: 1x/month, 1 drink per time    Drug use: No     Family History   Problem Relation Age of Onset    Asthma Mother     Heart Attack Mother     Hypertension Father     Stroke Father     Dementia Father     Lung Disease Father         heavy smoker    Other Brother         Morbid Obesity    Alcohol/Drug Brother         prescription drug problem    Alcohol/Drug Brother         street drugs    Cancer Paternal Uncle         Pancreatic    Colon Cancer Cousin     Cancer Paternal Aunt         colon ca      He  has a past medical history of Allergy, Cervical radiculopathy (2/16/2018), Family history of dementia, Family history of hypertension, Family history of stroke, Hypertension, Kidney stones, Multiple actinic keratoses (2/28/2019), NAFL (nonalcoholic fatty liver) (3/30/2018), SK (seborrheic keratosis) (6/30/2020), Stage 3 chronic kidney disease (6/30/2020), Tobacco use (10/22/2013), and Type 2 diabetes mellitus without complication, without long-term current use of insulin (HCC) (9/17/2014).   Past Surgical History:   Procedure  "Laterality Date    ORIF, KNEE Right     X 3    OTHER ORTHOPEDIC SURGERY Right     chronic dislocating shoulder    TONSILLECTOMY         Exam:   /58 (BP Location: Right arm, Patient Position: Sitting, BP Cuff Size: Adult)   Pulse 65   Temp 36.3 °C (97.4 °F) (Temporal)   Resp 18   Ht 1.753 m (5' 9\")   Wt 78.3 kg (172 lb 9.6 oz)   SpO2 95%  Body mass index is 25.49 kg/m².    Hearing fair.    Dentition fair  Alert, oriented in no acute distress.  Eye contact is good, speech goal directed, affect calm    Assessment and Plan. The following treatment and monitoring plan is recommended:    1. Annual physical exam      Services suggested: No services needed at this time  Health Care Screening: Age-appropriate preventive services recommended by USPTF and ACIP covered by Medicare were discussed today. Services ordered if indicated and agreed upon by the patient.  Referrals offered: Community-based lifestyle interventions to reduce health risks and promote self-management and wellness, fall prevention, nutrition, physical activity, tobacco-use cessation, weight loss, and mental health services as per orders if indicated.    Discussion today about general wellness and lifestyle habits:    Prevent falls and reduce trip hazards; Cautioned about securing or removing rugs.  Have a working fire alarm and carbon monoxide detector;   Engage in regular physical activity and social activities     Follow-up: 1 year annual  "

## 2025-07-23 RX ORDER — LOSARTAN POTASSIUM 50 MG/1
50 TABLET ORAL
Qty: 90 TABLET | Refills: 1 | Status: SHIPPED | OUTPATIENT
Start: 2025-07-23

## 2025-07-24 ENCOUNTER — HOSPITAL ENCOUNTER (OUTPATIENT)
Dept: LAB | Facility: MEDICAL CENTER | Age: 75
End: 2025-07-24
Attending: INTERNAL MEDICINE
Payer: MEDICARE

## 2025-07-24 DIAGNOSIS — D64.9 ANEMIA, UNSPECIFIED TYPE: ICD-10-CM

## 2025-07-24 LAB
ALBUMIN SERPL BCP-MCNC: 3.9 G/DL (ref 3.2–4.9)
ALBUMIN/GLOB SERPL: 1.4 G/DL
ALP SERPL-CCNC: 86 U/L (ref 30–99)
ALT SERPL-CCNC: 30 U/L (ref 2–50)
ANION GAP SERPL CALC-SCNC: 11 MMOL/L (ref 7–16)
AST SERPL-CCNC: 31 U/L (ref 12–45)
BILIRUB SERPL-MCNC: 0.3 MG/DL (ref 0.1–1.5)
BUN SERPL-MCNC: 28 MG/DL (ref 8–22)
CALCIUM ALBUM COR SERPL-MCNC: 9.4 MG/DL (ref 8.5–10.5)
CALCIUM SERPL-MCNC: 9.3 MG/DL (ref 8.5–10.5)
CHLORIDE SERPL-SCNC: 107 MMOL/L (ref 96–112)
CHOLEST SERPL-MCNC: 88 MG/DL (ref 100–199)
CO2 SERPL-SCNC: 21 MMOL/L (ref 20–33)
CREAT SERPL-MCNC: 1.77 MG/DL (ref 0.5–1.4)
ERYTHROCYTE [DISTWIDTH] IN BLOOD BY AUTOMATED COUNT: 47.1 FL (ref 35.9–50)
FASTING STATUS PATIENT QL REPORTED: NORMAL
GFR SERPLBLD CREATININE-BSD FMLA CKD-EPI: 39 ML/MIN/1.73 M 2
GLOBULIN SER CALC-MCNC: 2.7 G/DL (ref 1.9–3.5)
GLUCOSE SERPL-MCNC: 111 MG/DL (ref 65–99)
HCT VFR BLD AUTO: 38.8 % (ref 42–52)
HDLC SERPL-MCNC: 45 MG/DL
HGB BLD-MCNC: 12.4 G/DL (ref 14–18)
IRON SATN MFR SERPL: 35 % (ref 15–55)
IRON SERPL-MCNC: 88 UG/DL (ref 50–180)
LDLC SERPL CALC-MCNC: 32 MG/DL
MCH RBC QN AUTO: 30.8 PG (ref 27–33)
MCHC RBC AUTO-ENTMCNC: 32 G/DL (ref 32.3–36.5)
MCV RBC AUTO: 96.5 FL (ref 81.4–97.8)
PLATELET # BLD AUTO: 155 K/UL (ref 164–446)
PMV BLD AUTO: 12 FL (ref 9–12.9)
POTASSIUM SERPL-SCNC: 5.4 MMOL/L (ref 3.6–5.5)
PROT SERPL-MCNC: 6.6 G/DL (ref 6–8.2)
RBC # BLD AUTO: 4.02 M/UL (ref 4.7–6.1)
SODIUM SERPL-SCNC: 139 MMOL/L (ref 135–145)
TIBC SERPL-MCNC: 250 UG/DL (ref 250–450)
TRIGL SERPL-MCNC: 55 MG/DL (ref 0–149)
UIBC SERPL-MCNC: 162 UG/DL (ref 110–370)
WBC # BLD AUTO: 8.4 K/UL (ref 4.8–10.8)

## 2025-07-24 PROCEDURE — 80061 LIPID PANEL: CPT

## 2025-07-24 PROCEDURE — 83550 IRON BINDING TEST: CPT

## 2025-07-24 PROCEDURE — 80053 COMPREHEN METABOLIC PANEL: CPT

## 2025-07-24 PROCEDURE — 36415 COLL VENOUS BLD VENIPUNCTURE: CPT

## 2025-07-24 PROCEDURE — 83540 ASSAY OF IRON: CPT

## 2025-07-24 PROCEDURE — 85027 COMPLETE CBC AUTOMATED: CPT

## 2025-09-25 ENCOUNTER — APPOINTMENT (OUTPATIENT)
Dept: NEPHROLOGY | Facility: MEDICAL CENTER | Age: 75
End: 2025-09-25
Payer: MEDICARE